# Patient Record
Sex: MALE | Race: WHITE | NOT HISPANIC OR LATINO | Employment: FULL TIME | URBAN - METROPOLITAN AREA
[De-identification: names, ages, dates, MRNs, and addresses within clinical notes are randomized per-mention and may not be internally consistent; named-entity substitution may affect disease eponyms.]

---

## 2017-01-23 ENCOUNTER — TELEPHONE (OUTPATIENT)
Dept: PEDIATRICS | Facility: CLINIC | Age: 37
End: 2017-01-23

## 2017-01-23 ENCOUNTER — OFFICE VISIT (OUTPATIENT)
Dept: PEDIATRICS | Facility: CLINIC | Age: 37
End: 2017-01-23
Payer: COMMERCIAL

## 2017-01-23 VITALS
HEART RATE: 78 BPM | TEMPERATURE: 97.4 F | DIASTOLIC BLOOD PRESSURE: 80 MMHG | BODY MASS INDEX: 35.25 KG/M2 | WEIGHT: 266 LBS | HEIGHT: 73 IN | OXYGEN SATURATION: 97 % | SYSTOLIC BLOOD PRESSURE: 120 MMHG

## 2017-01-23 DIAGNOSIS — Z86.19: ICD-10-CM

## 2017-01-23 DIAGNOSIS — E34.9 TESTOSTERONE DEFICIENCY: Primary | ICD-10-CM

## 2017-01-23 PROCEDURE — 99214 OFFICE O/P EST MOD 30 MIN: CPT | Performed by: NURSE PRACTITIONER

## 2017-01-23 PROCEDURE — 87591 N.GONORRHOEAE DNA AMP PROB: CPT | Performed by: NURSE PRACTITIONER

## 2017-01-23 PROCEDURE — 87491 CHLMYD TRACH DNA AMP PROBE: CPT | Performed by: NURSE PRACTITIONER

## 2017-01-23 RX ORDER — TESTOSTERONE CYPIONATE 1000 MG/10ML
50 INJECTION, SOLUTION INTRAMUSCULAR WEEKLY
Qty: 0.28 ML | Refills: 1 | Status: SHIPPED | OUTPATIENT
Start: 2017-01-23 | End: 2017-01-31

## 2017-01-23 NOTE — NURSING NOTE
"Chief Complaint   Patient presents with     RECHECK       Initial /80 mmHg  Pulse 78  Temp(Src) 97.4  F (36.3  C) (Oral)  Ht 6' 1\" (1.854 m)  Wt 266 lb (120.657 kg)  BMI 35.10 kg/m2  SpO2 97% Estimated body mass index is 35.1 kg/(m^2) as calculated from the following:    Height as of this encounter: 6' 1\" (1.854 m).    Weight as of this encounter: 266 lb (120.657 kg).  BP completed using cuff size: rivas Birch MA      "

## 2017-01-23 NOTE — PROGRESS NOTES
"  SUBJECTIVE:                                                    Andrew Coulter is a 36 year old male who presents to clinic today for the following health issues:      Discuss Testosterone and Transition to injection. Doing well without side effects.    No sx STI. No new exposures.     Repeat STD Test    ROS: const/endo/gu/rectal otherwise negative     OBJECTIVE:  /80 mmHg  Pulse 78  Temp(Src) 97.4  F (36.3  C) (Oral)  Ht 6' 1\" (1.854 m)  Wt 266 lb (120.657 kg)  BMI 35.10 kg/m2  SpO2 97%  CONSTITUTIONAL: Alert, well-nourished, well-groomed, NAD  PSYCH: Bright affect. Appropriate mentation and speech.   RECTAL: Patient self-swabbed.    ASSESSMENT/PLAN:  (E29.1) Testosterone deficiency  (primary encounter diagnosis)  Comment: Excellent improvement in low T sx on 40mg (2 pumps) daily of 1.62% Androgel. No SEs. Patient would like to switch to injection for convenience.   Plan: testosterone cypionate 100 MG/ML SOLN inj,         Needle, Disp, 22G X 1-1/2\" MISC        50mg weekly (pt prefers weekly as opposed to every other week), which should be roughly equivalent to his current dosing. May eventually need to increase to 100mg weekly.         RTC 1 month mid week for T recheck     (Z86.19) History of gonorrhea of rectum  Comment: Here for recheck. Needs test for cure.   Plan: Patient self swabbed today for GONORRHEA.        Heaven Constantino, GEO-DNP.        "

## 2017-01-23 NOTE — TELEPHONE ENCOUNTER
Please let patient know I'd like him to come in for testosterone lab recheck in about a month. He should come mid-week (midway between injections) fasting between 8 and 10am (most accurate).    Umang,    Heaven Constantino, GEO-JORDYN.

## 2017-01-24 NOTE — TELEPHONE ENCOUNTER
Rx faxed to  [facility].  Pat Minor CMA    Called and spoke with patient.  Explained plan, patient agreed to plan, states will make appointment for lab only next month.  Pat Minor CMA

## 2017-01-25 LAB
C TRACH DNA SPEC QL NAA+PROBE: NORMAL
N GONORRHOEA DNA SPEC QL NAA+PROBE: NORMAL
SPECIMEN SOURCE: NORMAL
SPECIMEN SOURCE: NORMAL

## 2017-01-31 ENCOUNTER — TELEPHONE (OUTPATIENT)
Dept: PEDIATRICS | Facility: CLINIC | Age: 37
End: 2017-01-31

## 2017-01-31 DIAGNOSIS — E34.9 TESTOSTERONE DEFICIENCY: Primary | ICD-10-CM

## 2017-01-31 RX ORDER — TESTOSTERONE CYPIONATE 1000 MG/10ML
50 INJECTION, SOLUTION INTRAMUSCULAR WEEKLY
Qty: 10 ML | Refills: 1 | Status: SHIPPED | OUTPATIENT
Start: 2017-01-31 | End: 2017-05-11

## 2017-01-31 NOTE — TELEPHONE ENCOUNTER
[facility] pharmacy calling regarding order they received for Testosterone injection.   The dispense amount is incorrect.  Testosterone is supplied as 200 mg/ml or 100 mg/ml.  If they dispense the 100 mg dose, this is supplied in a 10 ml vial, and this is the dispense amount that needs to be ordered.  This vial is only good for one month after it has been punctured.    The 200 mg dose come in 1 ml dosing.  New order pending for 100 mg dosing with new dispense amount, please sign if in agreement.  SANTY Regalado RN

## 2017-02-21 NOTE — TELEPHONE ENCOUNTER
Called and spoke with patient.  Patient states he is still using gel and has not started injections.  Patient is aware he should have lab work done about 1 month after starting injections, knows that lab order is in system waiting for him.  Pat Minor, CMA

## 2017-02-27 ENCOUNTER — MYC REFILL (OUTPATIENT)
Dept: PEDIATRICS | Facility: CLINIC | Age: 37
End: 2017-02-27

## 2017-02-27 DIAGNOSIS — F51.01 PRIMARY INSOMNIA: ICD-10-CM

## 2017-02-28 RX ORDER — ZOLPIDEM TARTRATE 10 MG/1
10 TABLET ORAL
Qty: 30 TABLET | Refills: 2 | Status: SHIPPED | OUTPATIENT
Start: 2017-02-28 | End: 2017-09-14

## 2017-02-28 NOTE — TELEPHONE ENCOUNTER
Message from [employer]st[employer]:  Original authorizing provider: Jasper Osullivan MD, MD Andrew Coulter would like a refill of the following medications:  zolpidem (AMBIEN) 10 MG tablet [Jasper Osullivan MD, MD]    Preferred pharmacy: [place], [place] - [facility] [address]    Comment:

## 2017-02-28 NOTE — TELEPHONE ENCOUNTER
Done.  Please fax handwritten copy to pharmacy.    Matthias Agustin MD  Internal Medicine and Pediatrics

## 2017-02-28 NOTE — TELEPHONE ENCOUNTER
Ambien      Last Written Prescription Date:  4/12/16  Last Fill Quantity: 30,   # refills: 2  Last Office Visit with [facility], [facility] or [facility] prescribing provider: 1/23/17  Future Office visit:       Routing refill request to provider for review/approval because:  Drug not on the [facility], [facility] or [facility] refill protocol or controlled substance    Jelly Nicole RN  Message handled by Nurse Triage.

## 2017-03-22 ENCOUNTER — HOSPITAL ENCOUNTER (OUTPATIENT)
Dept: LAB | Facility: CLINIC | Age: 37
Discharge: HOME | End: 2017-03-22
Attending: NURSE PRACTITIONER | Admitting: NURSE PRACTITIONER
Payer: COMMERCIAL

## 2017-03-22 DIAGNOSIS — E34.9 TESTOSTERONE DEFICIENCY: ICD-10-CM

## 2017-03-22 PROCEDURE — 84403 ASSAY OF TOTAL TESTOSTERONE: CPT | Performed by: NURSE PRACTITIONER

## 2017-03-22 PROCEDURE — 84270 ASSAY OF SEX HORMONE GLOBUL: CPT | Performed by: NURSE PRACTITIONER

## 2017-03-22 PROCEDURE — 36415 COLL VENOUS BLD VENIPUNCTURE: CPT | Performed by: NURSE PRACTITIONER

## 2017-03-23 ENCOUNTER — MYC MEDICAL ADVICE (OUTPATIENT)
Dept: PEDIATRICS | Facility: CLINIC | Age: 37
End: 2017-03-23

## 2017-03-23 LAB
SHBG SERPL-SCNC: 17 NMOL/L (ref 11–80)
TESTOST FREE SERPL-MCNC: 12.96 NG/DL (ref 4.7–24.4)
TESTOST SERPL-MCNC: 456 NG/DL (ref 240–950)

## 2017-03-23 NOTE — TELEPHONE ENCOUNTER
Patient checked his Testosterone level two weeks early after beginning injections.  Was suppose to wait for one month  Testosterone level is normal.  Any need to recheck level in two weeks?  SANTY Regalado RN    Discussed with Heaven Constantino-patient doesn't need to repeat lab.  Notified thru My chart.  SANTY Regalado RN

## 2017-05-11 ENCOUNTER — MYC MEDICAL ADVICE (OUTPATIENT)
Dept: PEDIATRICS | Facility: CLINIC | Age: 37
End: 2017-05-11

## 2017-05-11 DIAGNOSIS — R79.89 LOW TESTOSTERONE: ICD-10-CM

## 2017-05-11 DIAGNOSIS — E34.9 TESTOSTERONE DEFICIENCY: ICD-10-CM

## 2017-05-11 RX ORDER — TESTOSTERONE CYPIONATE 1000 MG/10ML
50 INJECTION, SOLUTION INTRAMUSCULAR WEEKLY
Qty: 10 ML | Refills: 1 | Status: SHIPPED | OUTPATIENT
Start: 2017-05-11 | End: 2017-07-18 | Stop reason: DRUGHIGH

## 2017-05-11 NOTE — TELEPHONE ENCOUNTER
Patient self-administers injections.  Was last seen to transition to an injection on 1/23/17-needs a refill    testosterone cypionate 100 MG/ML SOLN inj  Last Written Prescription Date: 1/31/17  Last Fill Quantity: 10 ml,  # refills: 1   Last Office Visit with [facility], [facility] or [facility] prescribing provider: 1/23/17                                           Next 5 appointments (look out 90 days)     May 18, 2017  4:30 PM CDT   Office Visit with Jasper Osullivan MD   [facility] ([facility])    [address] [place] [id number] [phone]                Routing refill request to provider for review/approval because:  Drug not on the [facility] refill protocol or controlled substance    Jelly Nicole RN  Message handled by Nurse Triage.

## 2017-05-12 DIAGNOSIS — G47.33 OSA (OBSTRUCTIVE SLEEP APNEA): Primary | ICD-10-CM

## 2017-05-16 DIAGNOSIS — Z20.6 EXPOSURE TO HUMAN IMMUNODEFICIENCY VIRUS: ICD-10-CM

## 2017-05-16 PROCEDURE — 36415 COLL VENOUS BLD VENIPUNCTURE: CPT | Performed by: INTERNAL MEDICINE

## 2017-05-16 PROCEDURE — 80053 COMPREHEN METABOLIC PANEL: CPT | Performed by: INTERNAL MEDICINE

## 2017-05-16 PROCEDURE — 87389 HIV-1 AG W/HIV-1&-2 AB AG IA: CPT | Performed by: INTERNAL MEDICINE

## 2017-05-17 LAB
ALBUMIN SERPL-MCNC: 4.7 G/DL (ref 3.4–5)
ALP SERPL-CCNC: 117 U/L (ref 40–150)
ALT SERPL W P-5'-P-CCNC: 47 U/L (ref 0–70)
ANION GAP SERPL CALCULATED.3IONS-SCNC: 14 MMOL/L (ref 3–14)
AST SERPL W P-5'-P-CCNC: 26 U/L (ref 0–45)
BILIRUB SERPL-MCNC: 0.6 MG/DL (ref 0.2–1.3)
BUN SERPL-MCNC: 18 MG/DL (ref 7–30)
CALCIUM SERPL-MCNC: 9.3 MG/DL (ref 8.5–10.1)
CHLORIDE SERPL-SCNC: 103 MMOL/L (ref 94–109)
CO2 SERPL-SCNC: 22 MMOL/L (ref 20–32)
CREAT SERPL-MCNC: 0.97 MG/DL (ref 0.66–1.25)
GFR SERPL CREATININE-BSD FRML MDRD: 87 ML/MIN/1.7M2
GLUCOSE SERPL-MCNC: 85 MG/DL (ref 70–99)
HIV 1+2 AB+HIV1 P24 AG SERPL QL IA: NORMAL
POTASSIUM SERPL-SCNC: 4 MMOL/L (ref 3.4–5.3)
PROT SERPL-MCNC: 8.2 G/DL (ref 6.8–8.8)
SODIUM SERPL-SCNC: 139 MMOL/L (ref 133–144)

## 2017-06-21 ENCOUNTER — OFFICE VISIT (OUTPATIENT)
Dept: PEDIATRICS | Facility: CLINIC | Age: 37
End: 2017-06-21
Payer: COMMERCIAL

## 2017-06-21 VITALS
HEART RATE: 81 BPM | SYSTOLIC BLOOD PRESSURE: 130 MMHG | HEIGHT: 73 IN | TEMPERATURE: 97.5 F | BODY MASS INDEX: 35.65 KG/M2 | OXYGEN SATURATION: 97 % | DIASTOLIC BLOOD PRESSURE: 80 MMHG | WEIGHT: 269 LBS

## 2017-06-21 DIAGNOSIS — E34.9 TESTOSTERONE DEFICIENCY: ICD-10-CM

## 2017-06-21 DIAGNOSIS — Z20.6 EXPOSURE TO HUMAN IMMUNODEFICIENCY VIRUS: Primary | ICD-10-CM

## 2017-06-21 DIAGNOSIS — L91.8 SKIN TAG: ICD-10-CM

## 2017-06-21 DIAGNOSIS — E78.2 MIXED HYPERLIPIDEMIA: ICD-10-CM

## 2017-06-21 PROCEDURE — 11200 RMVL SKIN TAGS UP TO&INC 15: CPT | Performed by: INTERNAL MEDICINE

## 2017-06-21 PROCEDURE — 99214 OFFICE O/P EST MOD 30 MIN: CPT | Mod: 25 | Performed by: INTERNAL MEDICINE

## 2017-06-21 RX ORDER — TESTOSTERONE CYPIONATE 200 MG/ML
100 INJECTION, SOLUTION INTRAMUSCULAR
Qty: 10 ML | Refills: 3 | Status: SHIPPED | OUTPATIENT
Start: 2017-06-21 | End: 2017-07-25

## 2017-06-21 NOTE — PATIENT INSTRUCTIONS
1) Increase the testosterone to 100 mg     2) Labs in 2 months to recheck testosterone, cholesterol and slow recheck the truvada labs    3) Use topical antibiotic over the skin tag area as needed, watch the other moles for changes    Jasper Osullivan MD

## 2017-06-21 NOTE — PROGRESS NOTES
SUBJECTIVE:                                                    Andrew Coulter is a 36 year old male who presents to clinic today for the following health issues:      Recheck Medication    Testosterone: feels that this is working ok for him. No concerns except he has to change strength vial form as there is a shortage of the 100 mg/ml solution. Last testosterone was lower normal range. Has single testicle. No concerns about fertility in the future and is not worried about this.     PrEP therapy: no concerns about new exposures. No side effects. Feels that this has been working well. Cost ok.    Hyperlipidemia: due for recheck of lipids coming up. No myalgias. Continues to take atorvastatin 20 mg per day. No changes in diet.     Insomnia: continues on ambien, feels that this has been helping and working for him. No concerns.    Skin Check: has noted a skin tag on the left lower belt-line area. Notes that this will get caught and irritated at times. He has noted several moles in his tattoos as well.               Problem list and histories reviewed & adjusted, as indicated.  Additional history: as documented    Patient Active Problem List   Diagnosis     Overweight     ABRAM (obstructive sleep apnea)     PReP therapy     Anxiety     Low testosterone     Mixed hyperlipidemia     Testosterone deficiency     Cervicalgia     Poor posture     Acquired postural kyphosis     Muscle spasm     Thoracic segment dysfunction     Past Surgical History:   Procedure Laterality Date     HERNIA REPAIR       SEPTOPLASTY         Social History   Substance Use Topics     Smoking status: Former Smoker     Packs/day: 0.50     Years: 12.00     Types: Cigarettes     Quit date: 4/11/2016     Smokeless tobacco: Never Used      Comment: using Chantix     Alcohol use 0.0 oz/week     0 Standard drinks or equivalent per week      Comment: 3-4 times per month, 1-2 drinks     Family History   Problem Relation Age of Onset     Hyperlipidemia Mother   "    Depression Mother      Sleep Apnea Mother      Hyperlipidemia Father      Anxiety Disorder Father      Substance Abuse Father      Hypertension Maternal Grandmother      Anxiety Disorder Maternal Grandmother      Hypertension Maternal Grandfather      DIABETES Maternal Grandfather      Substance Abuse Paternal Grandfather      CEREBROVASCULAR DISEASE Paternal Grandfather            Reviewed and updated as needed this visit by clinical staff       Reviewed and updated as needed this visit by Provider         ROS:  Constitutional, HEENT, cardiovascular, pulmonary, GI, , musculoskeletal, neuro, skin, endocrine and psych systems are negative, except as otherwise noted.    OBJECTIVE:                                                    /80 (BP Location: Right arm, Cuff Size: Adult Large)  Pulse 81  Temp 97.5  F (36.4  C) (Oral)  Ht 6' 1\" (1.854 m)  Wt 269 lb (122 kg)  SpO2 97%  BMI 35.49 kg/m2  Body mass index is 35.49 kg/(m^2).  GENERAL: healthy, alert and no distress  NECK: no adenopathy, no asymmetry, masses, or scars and thyroid normal to palpation  RESP: lungs clear to auscultation - no rales, rhonchi or wheezes  CV: regular rate and rhythm, normal S1 S2, no S3 or S4, no murmur, click or rub, no peripheral edema and peripheral pulses strong  ABDOMEN: soft, nontender, no hepatosplenomegaly, no masses and bowel sounds normal  MS: no gross musculoskeletal defects noted, no edema  SKIN: 0.4 cm skin tag noted along the left belt-line area, normal nevus noted in tattoos on back and arm  NEURO: Normal strength and tone, mentation intact and speech normal    Procedure: written consent obtained, area prepped with iodine and using a sterile scissors and technique skin tag was removed.     Diagnostic Test Results:  Pending     ASSESSMENT/PLAN:                                                    1. Testosterone deficiency  - testosterone cypionate (DEPOTESTOTERONE) 200 MG/ML injection; Inject 0.5 mLs (100 mg) " into the muscle every 14 days  Dispense: 10 mL; Refill: 3  - **Testosterone Free and Total FUTURE anytime; Future    2. PReP therapy  Continue with current therapy on truvada, discussed risks and benefits, discussed that this is an insurance policy. Will do full STI testing at least once per year  - HIV Antigen Antibody Combo; Future  - Comprehensive metabolic panel; Future    3. Mixed hyperlipidemia  Due for recheck, will make a lab only for this  - Lipid panel reflex to direct LDL; Future    4. Skin tag  Removed as noted above.  - REMOVAL OF SKIN TAGS, FIRST 15    5. Insomnia: continue on ambien, sleep apnea well controlle.d     Patient Instructions   1) Increase the testosterone to 100 mg     2) Labs in 2 months to recheck testosterone, cholesterol and slow recheck the truvada labs    3) Use topical antibiotic over the skin tag area as needed, watch the other moles for changes    MD Jasper Gandara MD, MD  [facility]

## 2017-06-21 NOTE — MR AVS SNAPSHOT
After Visit Summary   6/21/2017    Andrew Coulter    MRN: [id number]           Patient Information     Date Of Birth          1980        Visit Information        Provider Department      6/21/2017 7:30 AM [facility]        Today's Diagnoses     PReP therapy    -  1    Testosterone deficiency        Mixed hyperlipidemia          Care Instructions    1) Increase the testosterone to 100 mg     2) Labs in 2 months to recheck testosterone, cholesterol and slow recheck the truvada labs    3) Use topical antibiotic over the skin tag area as needed, watch the other moles for changes    Jasper Osullivan MD          Follow-ups after your visit        Future tests that were ordered for you today     Open Future Orders        Priority Expected Expires Ordered    **Testosterone Free and Total FUTURE anytime Routine 6/21/2017 6/21/2018 6/21/2017    HIV Antigen Antibody Combo Routine  6/21/2018 6/21/2017    Comprehensive metabolic panel Routine  6/21/2018 6/21/2017    Lipid panel reflex to direct LDL Routine  6/21/2018 6/21/2017            Who to contact     If you have questions or need follow up information about today's clinic visit or your schedule please contact [facility] directly at [phone].  Normal or non-critical lab and imaging results will be communicated to you by MyChart, letter or phone within 4 business days after the clinic has received the results. If you do not hear from us within 7 days, please contact the clinic through MyChart or phone. If you have a critical or abnormal lab result, we will notify you by phone as soon as possible.  Submit refill requests through [employer] or call your pharmacy and they will forward the refill request to us. Please allow 3 business days for your refill to be completed.          Additional Information About Your Visit        MyChart Information     [employer] gives you secure access to your electronic health record. If  "you see a primary care provider, you can also send messages to your care team and make appointments. If you have questions, please call your primary care clinic.  If you do not have a primary care provider, please call [phone] and they will assist you.        Care EveryWhere ID     This is your Care EveryWhere ID. This could be used by other organizations to access your [facility] medical records  [id number]        Your Vitals Were     Pulse Temperature Height Pulse Oximetry BMI (Body Mass Index)       81 97.5  F (36.4  C) (Oral) 6' 1\" (1.854 m) 97% 35.49 kg/m2        Blood Pressure from Last 3 Encounters:   06/21/17 130/80   01/23/17 120/80   10/24/16 134/86    Weight from Last 3 Encounters:   06/21/17 269 lb (122 kg)   01/23/17 266 lb (120.7 kg)   10/24/16 297 lb 11.2 oz (135 kg)                 Today's Medication Changes          These changes are accurate as of: 6/21/17  8:03 AM.  If you have any questions, ask your nurse or doctor.               These medicines have changed or have updated prescriptions.        Dose/Directions    * testosterone cypionate 100 MG/ML Soln inj   This may have changed:  Another medication with the same name was added. Make sure you understand how and when to take each.   Used for:  Testosterone deficiency   Changed by:  Jasper Osullivan MD        Dose:  50 mg   Inject 0.5 mLs (50 mg) into the muscle once a week   Quantity:  10 mL   Refills:  1       * testosterone cypionate 200 MG/ML injection   Commonly known as:  DEPOTESTOTERONE   This may have changed:  You were already taking a medication with the same name, and this prescription was added. Make sure you understand how and when to take each.   Used for:  Testosterone deficiency   Changed by:  Jasper Osullivan MD        Dose:  100 mg   Inject 0.5 mLs (100 mg) into the muscle every 14 days   Quantity:  10 mL   Refills:  3       * Notice:  This list has 2 medication(s) that are the same as other medications prescribed " for you. Read the directions carefully, and ask your doctor or other care provider to review them with you.         Where to get your medicines      Some of these will need a paper prescription and others can be bought over the counter.  Ask your nurse if you have questions.     Bring a paper prescription for each of these medications     testosterone cypionate 200 MG/ML injection                Primary Care Provider Office Phone # Fax #    Jasper Osullivan [phone]       [facility] [address] [place] [address] [address] [place] [address]        Equal Access to Services     [facility] : Hadii aad ku hadasho Soomaali, waaxda luqadaha, qaybta kaalmada adeegyada, waxay kinain hayjuani dowling . [facility] [phone].    ATENCIÓN: Si habla español, tiene a singh disposición servicios gratuitos de asistencia lingüística. [facility] [phone].    We comply with applicable federal civil rights laws and [place] laws. We do not discriminate on the basis of race, color, national origin, age, disability sex, sexual orientation or gender identity.            Thank you!     Thank you for choosing [facility]  for your care. Our goal is always to provide you with excellent care. Hearing back from our patients is one way we can continue to improve our services. Please take a few minutes to complete the written survey that you may receive in the mail after your visit with us. Thank you!             Your Updated Medication List - Protect others around you: Learn how to safely use, store and throw away your medicines at www.disposemymeds.org.          This list is accurate as of: 6/21/17  8:03 AM.  Always use your most recent med list.                   Brand Name Dispense Instructions for use Diagnosis    aspirin 81 MG tablet      Take by mouth daily        atorvastatin 20 MG tablet    LIPITOR    90 tablet    Take 1 tablet (20 mg) by mouth daily    Hyperlipidemia LDL goal <130        "EPINEPHrine 0.3 MG/0.3ML injection     0.6 mL    Inject 0.3 mLs (0.3 mg) into the muscle once as needed for anaphylaxis    Bee allergy status       Needle (Disp) 22G X 1-1/2\" Misc     25 each    Use 1 needle per injection.    Testosterone deficiency       OMEGA-3 FISH OIL PO           omeprazole 20 MG CR capsule    priLOSEC    90 capsule    Take 1 capsule (20 mg) by mouth daily    Gastroesophageal reflux disease without esophagitis       * testosterone cypionate 100 MG/ML Soln inj     10 mL    Inject 0.5 mLs (50 mg) into the muscle once a week    Testosterone deficiency       * testosterone cypionate 200 MG/ML injection    DEPOTESTOTERONE    10 mL    Inject 0.5 mLs (100 mg) into the muscle every 14 days    Testosterone deficiency       TRUVADA 200-300 MG per tablet   Generic drug:  emtricitabine-tenofovir     90 tablet    TAKE ONE TABLET BY MOUTH EVERY DAY    Exposure to human immunodeficiency virus       VITAMIN D PO           zolpidem 10 MG tablet    AMBIEN    30 tablet    Take 1 tablet (10 mg) by mouth nightly as needed for sleep    Primary insomnia       * Notice:  This list has 2 medication(s) that are the same as other medications prescribed for you. Read the directions carefully, and ask your doctor or other care provider to review them with you.      "

## 2017-06-27 ENCOUNTER — OFFICE VISIT (OUTPATIENT)
Dept: URGENT CARE | Facility: URGENT CARE | Age: 37
End: 2017-06-27
Payer: COMMERCIAL

## 2017-06-27 VITALS
DIASTOLIC BLOOD PRESSURE: 62 MMHG | HEART RATE: 83 BPM | OXYGEN SATURATION: 95 % | SYSTOLIC BLOOD PRESSURE: 130 MMHG | BODY MASS INDEX: 35.49 KG/M2 | WEIGHT: 269 LBS | TEMPERATURE: 98.3 F

## 2017-06-27 DIAGNOSIS — R19.7 DIARRHEA, UNSPECIFIED TYPE: Primary | ICD-10-CM

## 2017-06-27 PROCEDURE — 99213 OFFICE O/P EST LOW 20 MIN: CPT | Performed by: PHYSICIAN ASSISTANT

## 2017-06-27 NOTE — PATIENT INSTRUCTIONS
Bacterial Gastroenteritis (Adult)    You have gastroenteritis. It is an infection in your intestinal tract caused by bacteria (dysentery). Viruses, parasites, and toxins may also cause gastroenteritis. This infection may cause fever, vomiting, stomach cramping, and diarrhea. You may have blood or mucus in your stool. Some of the more common causes of bacterial gastroenteritis include E. coli, salmonella, shigella, campylobacter, and clostridium difficile.  Antibiotics are often used to treat this type of infection. Sometimes you may need to wait until a stool culture is done before your healthcare provider gives you an antibiotic. In the meantime, follow the advice below. Do not take antibiotics without your provider's recommendation. This can lead to other complications in certain types of bacterial gastroenteritis.  Home care  Medications    If your healthcare provider prescribed antibiotics, be sure you take them until they are finished.    You may use acetaminophen or NSAID medicines like ibuprofen or naproxen to control fever unless another medicine was prescribed. If you have chronic liver or kidney disease, talk with your provider before using these medicines. Also talk with your provider if you've had a stomach ulcer or gastrointestinal bleeding. Don't give aspirin to anyone under 18 years of age who is ill with a fever. It may cause severe liver damage. Don't use NSAID medicines if you are already taking one for another condition (like arthritis) or are on aspirin such as for heart disease or after a stroke.    Don't take or give over-the-counter antidiarrhea medicines, unless your healthcare provider prescribed them. Antidiarrhea medicine may make your symptoms last longer if the cause is an infectious diarrhea.    You may be given medicine for nausea and vomiting to help you keep down fluids. Take these medicines as prescribed.    Gastroenteritis is transmitted by contact with the stool or vomit of an  infected person. This can occur directly from person to person or indirectly from contact with a contaminated surface.  General care    If symptoms are severe, rest at home for the next 24 hours, or until you feel better.    Washing your hands with soap and water and using alcohol-bases  is the best way to stop the spread of infection. Wash your hands after touching anyone who is sick.    Wash your hands after using the toilet and before meals. Clean the toilet after each use.    Avoid tobacco, caffeine, and alcohol. These can make your symptoms worse.  Diet  Liquids are the first step:    Water and clear liquids are important so you don't get dehydrated. Drink a small amount at a time or suck on ice chips.  Food    People with diarrhea should not make or serve food for others. When making food for yourself, wash your hands before and after.    Wash your hands after using cutting boards, countertops, and knives that have touched raw food.    Keep uncooked meats away from cooked and ready-to-eat foods.  During the first 24 hours, follow the diet below:    Beverages: sport drinks, soft drinks without caffeine, mineral water (plain or flavored), decaffeinated tea and coffee. If you are very dehydrated, sports drinks are not a good choice. They have too much sugar and not enough electrolytes. In this case, commercially available products called oral rehydration solutions, are best.    Soups: clear broth, consommé, and bouillon    Desserts: plain gelatin, popsicles, and fruit juice bars  During the next 24 hours (the second day), you may add these foods to the above list if you are feeling better. If not, continue what you did the first day:    Hot cereal, plain toast, bread, rolls, crackers    Plain noodles, rice, mashed potatoes, chicken noodle or rice soup    Unsweetened canned fruit (avoid pineapple), bananas    Limit fat to less than 15 grams per day. Avoid margarine, butter, oils, mayonnaise, sauces,  gravies, fried foods, peanut butter, meat, poultry, and fish.    Limit fiber. Avoid raw or cooked vegetables, fresh fruits (except bananas), and bran cereals.    Limit dairy    Continue to avoid alcohol    Limit caffeine and chocolate. No spices or seasonings except salt.  During the next 24 hours:    Gradually resume a normal diet, as you feel better and your symptoms improve.    If at any time you start feeling worse again, go back to clear liquids until you feel better.  Follow-up care  Follow up with your healthcare provider, or as advised. If you don't get better in 24 hours, or if your diarrhea lasts more than 1 week. Also follow up if you are unable to keep liquids down and stay hydrated.  If a stool (diarrhea) sample was taken, you can call for the results as directed.  Call 911  Call 911 if any of these occur:    Trouble breathing    Confused    Very drowsy or trouble awakening    Fainting or loss of consciousness    Rapid heart rate    Chest pain    Seizure    Stiff neck  When to seek medical advice  Call your healthcare provider right away if any of these occur:    Increasing abdominal pain or constant lower right abdominal pain    Continued vomiting (unable to keep liquids down)    Diarrhea for more than 2 days in adults and 24 hours in children    Stools containing blood or pus or black tarry stools    Dark urine, reduced urine output    Weakness, dizziness    Drowsiness    Fever of 100.4 F (38.0 C), oral, or higher; or not better with fever medicine    New rash    If you are experiencing muscle weakness or arthritis symptoms during or after your gastroenteritis is gone  Date Last Reviewed: 1/3/2016    9808-7111 [employer]. [address] [place] [address], [place] [address]. All rights reserved. This information is not intended as a substitute for professional medical care. Always follow your healthcare professional's instructions.

## 2017-06-27 NOTE — MR AVS SNAPSHOT
After Visit Summary   6/27/2017    Andrew Coulter    MRN: [id number]           Patient Information     Date Of Birth          1980        Visit Information        Provider Department      6/27/2017 2:00 PM [facility]  PROVIDER [facility]        Today's Diagnoses     Diarrhea, unspecified type    -  1      Care Instructions      Bacterial Gastroenteritis (Adult)    You have gastroenteritis. It is an infection in your intestinal tract caused by bacteria (dysentery). Viruses, parasites, and toxins may also cause gastroenteritis. This infection may cause fever, vomiting, stomach cramping, and diarrhea. You may have blood or mucus in your stool. Some of the more common causes of bacterial gastroenteritis include E. coli, salmonella, shigella, campylobacter, and clostridium difficile.  Antibiotics are often used to treat this type of infection. Sometimes you may need to wait until a stool culture is done before your healthcare provider gives you an antibiotic. In the meantime, follow the advice below. Do not take antibiotics without your provider's recommendation. This can lead to other complications in certain types of bacterial gastroenteritis.  Home care  Medications    If your healthcare provider prescribed antibiotics, be sure you take them until they are finished.    You may use acetaminophen or NSAID medicines like ibuprofen or naproxen to control fever unless another medicine was prescribed. If you have chronic liver or kidney disease, talk with your provider before using these medicines. Also talk with your provider if you've had a stomach ulcer or gastrointestinal bleeding. Don't give aspirin to anyone under 18 years of age who is ill with a fever. It may cause severe liver damage. Don't use NSAID medicines if you are already taking one for another condition (like arthritis) or are on aspirin such as for heart disease or after a stroke.    Don't take or give over-the-counter  antidiarrhea medicines, unless your healthcare provider prescribed them. Antidiarrhea medicine may make your symptoms last longer if the cause is an infectious diarrhea.    You may be given medicine for nausea and vomiting to help you keep down fluids. Take these medicines as prescribed.    Gastroenteritis is transmitted by contact with the stool or vomit of an infected person. This can occur directly from person to person or indirectly from contact with a contaminated surface.  General care    If symptoms are severe, rest at home for the next 24 hours, or until you feel better.    Washing your hands with soap and water and using alcohol-bases  is the best way to stop the spread of infection. Wash your hands after touching anyone who is sick.    Wash your hands after using the toilet and before meals. Clean the toilet after each use.    Avoid tobacco, caffeine, and alcohol. These can make your symptoms worse.  Diet  Liquids are the first step:    Water and clear liquids are important so you don't get dehydrated. Drink a small amount at a time or suck on ice chips.  Food    People with diarrhea should not make or serve food for others. When making food for yourself, wash your hands before and after.    Wash your hands after using cutting boards, countertops, and knives that have touched raw food.    Keep uncooked meats away from cooked and ready-to-eat foods.  During the first 24 hours, follow the diet below:    Beverages: sport drinks, soft drinks without caffeine, mineral water (plain or flavored), decaffeinated tea and coffee. If you are very dehydrated, sports drinks are not a good choice. They have too much sugar and not enough electrolytes. In this case, commercially available products called oral rehydration solutions, are best.    Soups: clear broth, consommé, and bouillon    Desserts: plain gelatin, popsicles, and fruit juice bars  During the next 24 hours (the second day), you may add these foods  to the above list if you are feeling better. If not, continue what you did the first day:    Hot cereal, plain toast, bread, rolls, crackers    Plain noodles, rice, mashed potatoes, chicken noodle or rice soup    Unsweetened canned fruit (avoid pineapple), bananas    Limit fat to less than 15 grams per day. Avoid margarine, butter, oils, mayonnaise, sauces, gravies, fried foods, peanut butter, meat, poultry, and fish.    Limit fiber. Avoid raw or cooked vegetables, fresh fruits (except bananas), and bran cereals.    Limit dairy    Continue to avoid alcohol    Limit caffeine and chocolate. No spices or seasonings except salt.  During the next 24 hours:    Gradually resume a normal diet, as you feel better and your symptoms improve.    If at any time you start feeling worse again, go back to clear liquids until you feel better.  Follow-up care  Follow up with your healthcare provider, or as advised. If you don't get better in 24 hours, or if your diarrhea lasts more than 1 week. Also follow up if you are unable to keep liquids down and stay hydrated.  If a stool (diarrhea) sample was taken, you can call for the results as directed.  Call 911  Call 911 if any of these occur:    Trouble breathing    Confused    Very drowsy or trouble awakening    Fainting or loss of consciousness    Rapid heart rate    Chest pain    Seizure    Stiff neck  When to seek medical advice  Call your healthcare provider right away if any of these occur:    Increasing abdominal pain or constant lower right abdominal pain    Continued vomiting (unable to keep liquids down)    Diarrhea for more than 2 days in adults and 24 hours in children    Stools containing blood or pus or black tarry stools    Dark urine, reduced urine output    Weakness, dizziness    Drowsiness    Fever of 100.4 F (38.0 C), oral, or higher; or not better with fever medicine    New rash    If you are experiencing muscle weakness or arthritis symptoms during or after your  gastroenteritis is gone  Date Last Reviewed: 1/3/2016    6066-4224 [employer]. [address] [place] [address], [place] [address]. All rights reserved. This information is not intended as a substitute for professional medical care. Always follow your healthcare professional's instructions.                Follow-ups after your visit        Future tests that were ordered for you today     Open Future Orders        Priority Expected Expires Ordered    Enteric Bacteria and Virus Panel by ELIZABETH Stool Routine  6/27/2018 6/27/2017            Who to contact     If you have questions or need follow up information about today's clinic visit or your schedule please contact [facility] directly at [phone].  Normal or non-critical lab and imaging results will be communicated to you by [employer], letter or phone within 4 business days after the clinic has received the results. If you do not hear from us within 7 days, please contact the clinic through [employer] or phone. If you have a critical or abnormal lab result, we will notify you by phone as soon as possible.  Submit refill requests through [employer] or call your pharmacy and they will forward the refill request to us. Please allow 3 business days for your refill to be completed.          Additional Information About Your Visit        MyChart Information     [employer] gives you secure access to your electronic health record. If you see a primary care provider, you can also send messages to your care team and make appointments. If you have questions, please call your primary care clinic.  If you do not have a primary care provider, please call [phone] and they will assist you.        Care EveryWhere ID     This is your Care EveryWhere ID. This could be used by other organizations to access your [place] medical records  [id number]        Your Vitals Were     Pulse Temperature Pulse Oximetry BMI (Body Mass Index)          83 98.3  F (36.8  C) (Tympanic)  95% 35.49 kg/m2         Blood Pressure from Last 3 Encounters:   06/27/17 130/62   06/21/17 130/80   01/23/17 120/80    Weight from Last 3 Encounters:   06/27/17 269 lb (122 kg)   06/21/17 269 lb (122 kg)   01/23/17 266 lb (120.7 kg)               Primary Care Provider Office Phone # Fax #    Jasper Osullivan [phone]       [facility] [address] [place] [address]        Equal Access to Services     [facility]: Hadii aad ku hadasho Soomaali, waaxda luqadaha, qaybta kaalmada adeegyada, alfred dowling . So [facility] [phone].    ATENCIÓN: Si habla español, tiene a singh disposición servicios gratuitos de asistencia lingüística. [facility] [phone].    We comply with applicable federal civil rights laws and [place] laws. We do not discriminate on the basis of race, color, national origin, age, disability sex, sexual orientation or gender identity.            Thank you!     Thank you for choosing [facility]  for your care. Our goal is always to provide you with excellent care. Hearing back from our patients is one way we can continue to improve our services. Please take a few minutes to complete the written survey that you may receive in the mail after your visit with us. Thank you!             Your Updated Medication List - Protect others around you: Learn how to safely use, store and throw away your medicines at www.disposemymeds.org.          This list is accurate as of: 6/27/17  2:59 PM.  Always use your most recent med list.                   Brand Name Dispense Instructions for use Diagnosis    aspirin 81 MG tablet      Take by mouth daily        atorvastatin 20 MG tablet    LIPITOR    90 tablet    Take 1 tablet (20 mg) by mouth daily    Hyperlipidemia LDL goal <130       EPINEPHrine 0.3 MG/0.3ML injection     0.6 mL    Inject 0.3 mLs (0.3 mg) into the muscle once as needed for anaphylaxis    Bee allergy status        "Needle (Disp) 22G X 1-1/2\" Misc     25 each    Use 1 needle per injection.    Testosterone deficiency       OMEGA-3 FISH OIL PO           omeprazole 20 MG CR capsule    priLOSEC    90 capsule    Take 1 capsule (20 mg) by mouth daily    Gastroesophageal reflux disease without esophagitis       * testosterone cypionate 100 MG/ML Soln inj     10 mL    Inject 0.5 mLs (50 mg) into the muscle once a week    Testosterone deficiency       * testosterone cypionate 200 MG/ML injection    DEPOTESTOTERONE    10 mL    Inject 0.5 mLs (100 mg) into the muscle every 14 days    Testosterone deficiency       TRUVADA 200-300 MG per tablet   Generic drug:  emtricitabine-tenofovir     90 tablet    TAKE ONE TABLET BY MOUTH EVERY DAY    Exposure to human immunodeficiency virus       VITAMIN D PO           zolpidem 10 MG tablet    AMBIEN    30 tablet    Take 1 tablet (10 mg) by mouth nightly as needed for sleep    Primary insomnia       * Notice:  This list has 2 medication(s) that are the same as other medications prescribed for you. Read the directions carefully, and ask your doctor or other care provider to review them with you.      "

## 2017-06-27 NOTE — NURSING NOTE
"Chief Complaint   Patient presents with     Urgent Care     Diarrhea     Pt states has had diarrhea x 6 days. States has had chills and body aches. PT has tried immodium.        Initial /62 (BP Location: Right arm, Patient Position: Chair, Cuff Size: Adult Large)  Pulse 83  Temp 98.3  F (36.8  C) (Tympanic)  Wt 269 lb (122 kg)  SpO2 95%  BMI 35.49 kg/m2 Estimated body mass index is 35.49 kg/(m^2) as calculated from the following:    Height as of 6/21/17: 6' 1\" (1.854 m).    Weight as of this encounter: 269 lb (122 kg).  Medication Reconciliation: unable or not appropriate to perform   Ezra Toth CMA (CARRI) 6/27/2017 2:26 PM    "

## 2017-06-27 NOTE — PROGRESS NOTES
"SUBJECTIVE  HPI:  Andrew Coulter is a 36 year old male who presents with the CC of diarrhea.  Patient denies pain, reporting generalized abdominal discomfort which at its worst is a level 2 on a scale of 1-10.  Pain has been present for 5 day(s) and is stable.  EXACERBATING FACTORS: recumbency  RELIEVING FACTORS: nothing.  ASSOCIATED SX: diarrhea and bloating.    Reports 6 loose, watery stools in the last 24 hours.  Tolerating fluids. No fever. No blood in stool.  Reminds patient of last time he had giardia.  No pet or animal exposures, no outdoor activities, no food exposures or recent travel. Works as RN in ICU.       No past medical history on file.  Current Outpatient Prescriptions   Medication Sig Dispense Refill     testosterone cypionate (DEPOTESTOTERONE) 200 MG/ML injection Inject 0.5 mLs (100 mg) into the muscle every 14 days 10 mL 3     testosterone cypionate 100 MG/ML SOLN inj Inject 0.5 mLs (50 mg) into the muscle once a week 10 mL 1     TRUVADA 200-300 MG per tablet TAKE ONE TABLET BY MOUTH EVERY DAY 90 tablet 0     zolpidem (AMBIEN) 10 MG tablet Take 1 tablet (10 mg) by mouth nightly as needed for sleep 30 tablet 2     Needle, Disp, 22G X 1-1/2\" MISC Use 1 needle per injection. 25 each 1     atorvastatin (LIPITOR) 20 MG tablet Take 1 tablet (20 mg) by mouth daily 90 tablet 3     omeprazole (PRILOSEC) 20 MG capsule Take 1 capsule (20 mg) by mouth daily 90 capsule 3     EPINEPHrine (EPIPEN) 0.3 MG/0.3ML injection Inject 0.3 mLs (0.3 mg) into the muscle once as needed for anaphylaxis 0.6 mL 1     aspirin 81 MG tablet Take by mouth daily       Omega-3 Fatty Acids (OMEGA-3 FISH OIL PO)        Cholecalciferol (VITAMIN D PO)        Social History   Substance Use Topics     Smoking status: Former Smoker     Packs/day: 0.50     Years: 12.00     Types: Cigarettes     Quit date: 4/11/2016     Smokeless tobacco: Never Used      Comment: using Chantix     Alcohol use 0.0 oz/week     0 Standard drinks or equivalent " per week      Comment: 3-4 times per month, 1-2 drinks       ROS:  Review of systems negative except as stated above.    OBJECTIVE:  /62 (BP Location: Right arm, Patient Position: Chair, Cuff Size: Adult Large)  Pulse 83  Temp 98.3  F (36.8  C) (Tympanic)  Wt 269 lb (122 kg)  SpO2 95%  BMI 35.49 kg/m2  GENERAL APPEARANCE: healthy, alert and no distress  EYES: EOMI,  PERRL, conjunctiva clear  HENT: ear canals and TM's normal.  Nose and mouth without ulcers, erythema or lesions  NECK: supple, nontender, no lymphadenopathy  RESP: lungs clear to auscultation - no rales, rhonchi or wheezes  CV: regular rates and rhythm, normal S1 S2, no murmur noted  ABDOMEN:  soft, no guarding, some tenderness reported throughout, no HSM or masses and bowel sounds normal      ASSESSMENT:  (R19.7) Diarrhea, unspecified type  (primary encounter diagnosis)  Comment: patient appears and reports being well hydrated.  Plan: Enteric Bacteria and Virus Panel by ELIZABETH Stool       Red flags and emergent follow up discussed, and understood by patient  Follow up with PCP if symptoms worsen or fail to improve

## 2017-07-05 ENCOUNTER — HOSPITAL ENCOUNTER (OUTPATIENT)
Dept: LAB | Facility: CLINIC | Age: 37
Discharge: HOME | End: 2017-07-05
Attending: PHYSICIAN ASSISTANT | Admitting: PHYSICIAN ASSISTANT
Payer: COMMERCIAL

## 2017-07-05 DIAGNOSIS — R19.7 DIARRHEA, UNSPECIFIED TYPE: ICD-10-CM

## 2017-07-05 LAB
CAMPYLOBACTER GROUP BY NAT: NOT DETECTED
ENTERIC PATHOGEN COMMENT: NORMAL
NOROVIRUS I AND II BY NAT: NOT DETECTED
ROTAVIRUS A BY NAT: NOT DETECTED
SALMONELLA SPECIES BY NAT: NOT DETECTED
SHIGA TOXIN 1 GENE BY NAT: NOT DETECTED
SHIGA TOXIN 2 GENE BY NAT: NOT DETECTED
SHIGELLA SP+EIEC IPAH STL QL NAA+PROBE: NOT DETECTED
VIBRIO GROUP BY NAT: NOT DETECTED
YERSINIA ENTEROCOLITICA BY NAT: NOT DETECTED

## 2017-07-05 PROCEDURE — 87506 IADNA-DNA/RNA PROBE TQ 6-11: CPT | Performed by: PHYSICIAN ASSISTANT

## 2017-07-18 ENCOUNTER — OFFICE VISIT (OUTPATIENT)
Dept: PEDIATRICS | Facility: CLINIC | Age: 37
End: 2017-07-18
Payer: COMMERCIAL

## 2017-07-18 VITALS
HEART RATE: 80 BPM | DIASTOLIC BLOOD PRESSURE: 76 MMHG | WEIGHT: 267 LBS | BODY MASS INDEX: 35.39 KG/M2 | OXYGEN SATURATION: 96 % | TEMPERATURE: 98.1 F | HEIGHT: 73 IN | SYSTOLIC BLOOD PRESSURE: 124 MMHG

## 2017-07-18 DIAGNOSIS — Z71.6 ENCOUNTER FOR SMOKING CESSATION COUNSELING: Primary | ICD-10-CM

## 2017-07-18 PROCEDURE — 99213 OFFICE O/P EST LOW 20 MIN: CPT | Performed by: INTERNAL MEDICINE

## 2017-07-18 RX ORDER — BUPROPION HYDROCHLORIDE 300 MG/1
300 TABLET ORAL EVERY MORNING
Qty: 90 TABLET | Refills: 1 | Status: SHIPPED | OUTPATIENT
Start: 2017-07-18 | End: 2018-01-02

## 2017-07-18 RX ORDER — BUPROPION HYDROCHLORIDE 150 MG/1
150 TABLET ORAL EVERY MORNING
Qty: 45 TABLET | Refills: 1 | Status: SHIPPED | OUTPATIENT
Start: 2017-07-18 | End: 2017-09-14 | Stop reason: DRUGHIGH

## 2017-07-18 NOTE — PATIENT INSTRUCTIONS
1) Wellbutrin start at 150 mg per day and increase to 300 mg per day after 1-2 weeks if tolerating well. Set a quit day in 2-4 weeks once on this    2) Can use patches and gum if wanting with this    3) Let me know if side effects on this or other concerns come up.    Jasper Osullivan MD

## 2017-07-18 NOTE — NURSING NOTE
"Chief Complaint   Patient presents with     Medication Request       Initial /76 (BP Location: Right arm, Cuff Size: Adult Large)  Pulse 80  Temp 98.1  F (36.7  C) (Oral)  Ht 6' 1\" (1.854 m)  Wt 267 lb (121.1 kg)  SpO2 96%  BMI 35.23 kg/m2 Estimated body mass index is 35.23 kg/(m^2) as calculated from the following:    Height as of this encounter: 6' 1\" (1.854 m).    Weight as of this encounter: 267 lb (121.1 kg).  Medication Reconciliation: complete   Janee Birch MA    "

## 2017-07-18 NOTE — MR AVS SNAPSHOT
After Visit Summary   7/18/2017    Andrew Coulter    MRN: [id number]           Patient Information     Date Of Birth          1980        Visit Information        Provider Department      7/18/2017 3:30 PM Jasper Osullivan MD [facility]        Today's Diagnoses     Encounter for smoking cessation counseling    -  1      Care Instructions    1) Wellbutrin start at 150 mg per day and increase to 300 mg per day after 1-2 weeks if tolerating well. Set a quit day in 2-4 weeks once on this    2) Can use patches and gum if wanting with this    3) Let me know if side effects on this or other concerns come up.    Jasper Osullivan MD          Follow-ups after your visit        Who to contact     If you have questions or need follow up information about today's clinic visit or your schedule please contact [facility] directly at [phone].  Normal or non-critical lab and imaging results will be communicated to you by MyChart, letter or phone within 4 business days after the clinic has received the results. If you do not hear from us within 7 days, please contact the clinic through [employer] or phone. If you have a critical or abnormal lab result, we will notify you by phone as soon as possible.  Submit refill requests through [employer] or call your pharmacy and they will forward the refill request to us. Please allow 3 business days for your refill to be completed.          Additional Information About Your Visit        MyChart Information     [employer] gives you secure access to your electronic health record. If you see a primary care provider, you can also send messages to your care team and make appointments. If you have questions, please call your primary care clinic.  If you do not have a primary care provider, please call [phone] and they will assist you.        Care EveryWhere ID     This is your Care EveryWhere ID. This could be used by other organizations to access your  "[place] medical records  [id number]        Your Vitals Were     Pulse Temperature Height Pulse Oximetry BMI (Body Mass Index)       80 98.1  F (36.7  C) (Oral) 6' 1\" (1.854 m) 96% 35.23 kg/m2        Blood Pressure from Last 3 Encounters:   07/18/17 124/76   06/27/17 130/62   06/21/17 130/80    Weight from Last 3 Encounters:   07/18/17 267 lb (121.1 kg)   06/27/17 269 lb (122 kg)   06/21/17 269 lb (122 kg)              Today, you had the following     No orders found for display         Today's Medication Changes          These changes are accurate as of: 7/18/17  3:48 PM.  If you have any questions, ask your nurse or doctor.               Start taking these medicines.        Dose/Directions    * buPROPion 150 MG 24 hr tablet   Commonly known as:  WELLBUTRIN XL   Used for:  Encounter for smoking cessation counseling   Started by:  Jasper Osullivan MD        Dose:  150 mg   Take 1 tablet (150 mg) by mouth every morning   Quantity:  45 tablet   Refills:  1       * buPROPion 300 MG 24 hr tablet   Commonly known as:  WELLBUTRIN XL   Used for:  Encounter for smoking cessation counseling   Started by:  Jasper Osullivan MD        Dose:  300 mg   Take 1 tablet (300 mg) by mouth every morning   Quantity:  90 tablet   Refills:  1       * Notice:  This list has 2 medication(s) that are the same as other medications prescribed for you. Read the directions carefully, and ask your doctor or other care provider to review them with you.      These medicines have changed or have updated prescriptions.        Dose/Directions    testosterone cypionate 200 MG/ML injection   Commonly known as:  DEPOTESTOTERONE   This may have changed:  Another medication with the same name was removed. Continue taking this medication, and follow the directions you see here.   Used for:  Testosterone deficiency   Changed by:  Jasper Osullivan MD        Dose:  100 mg   Inject 0.5 mLs (100 mg) into the muscle every 14 days   Quantity:  10 mL "   Refills:  3            Where to get your medicines      These medications were sent to [facility] [place], [place] - [address]  [address] [facility] [address]     Phone:  [phone]     buPROPion 150 MG 24 hr tablet    buPROPion 300 MG 24 hr tablet                Primary Care Provider Office Phone # Fax #    Jasper Osullivan [phone]       [facility] [address] [place] [address]        Equal Access to Services     [facility]: Hadii aad ku hadasho Soomaali, waaxda luqadaha, qaybta kaalmada adeegyada, waxay idiin hayaan adeeg kharaerica dowling . So [facility] [phone].    ATENCIÓN: Si habla español, tiene a singh disposición servicios gratuitos de asistencia lingüística. [facility] [phone].    We comply with applicable federal civil rights laws and [place] laws. We do not discriminate on the basis of race, color, national origin, age, disability sex, sexual orientation or gender identity.            Thank you!     Thank you for choosing [facility]  for your care. Our goal is always to provide you with excellent care. Hearing back from our patients is one way we can continue to improve our services. Please take a few minutes to complete the written survey that you may receive in the mail after your visit with us. Thank you!             Your Updated Medication List - Protect others around you: Learn how to safely use, store and throw away your medicines at www.disposemymeds.org.          This list is accurate as of: 7/18/17  3:48 PM.  Always use your most recent med list.                   Brand Name Dispense Instructions for use Diagnosis    aspirin 81 MG tablet      Take by mouth daily        atorvastatin 20 MG tablet    LIPITOR    90 tablet    Take 1 tablet (20 mg) by mouth daily    Hyperlipidemia LDL goal <130       * buPROPion 150 MG 24 hr tablet    WELLBUTRIN XL    45 tablet    Take 1 tablet (150 mg) by  "mouth every morning    Encounter for smoking cessation counseling       * buPROPion 300 MG 24 hr tablet    WELLBUTRIN XL    90 tablet    Take 1 tablet (300 mg) by mouth every morning    Encounter for smoking cessation counseling       EPINEPHrine 0.3 MG/0.3ML injection 2-pack    EPIPEN/ADRENACLICK/or ANY BX GENERIC EQUIV    0.6 mL    Inject 0.3 mLs (0.3 mg) into the muscle once as needed for anaphylaxis    Bee allergy status       Needle (Disp) 22G X 1-1/2\" Misc     25 each    Use 1 needle per injection.    Testosterone deficiency       OMEGA-3 FISH OIL PO           omeprazole 20 MG CR capsule    priLOSEC    90 capsule    Take 1 capsule (20 mg) by mouth daily    Gastroesophageal reflux disease without esophagitis       testosterone cypionate 200 MG/ML injection    DEPOTESTOTERONE    10 mL    Inject 0.5 mLs (100 mg) into the muscle every 14 days    Testosterone deficiency       TRUVADA 200-300 MG per tablet   Generic drug:  emtricitabine-tenofovir     90 tablet    TAKE ONE TABLET BY MOUTH EVERY DAY    Exposure to human immunodeficiency virus       VITAMIN D PO           zolpidem 10 MG tablet    AMBIEN    30 tablet    Take 1 tablet (10 mg) by mouth nightly as needed for sleep    Primary insomnia       * Notice:  This list has 2 medication(s) that are the same as other medications prescribed for you. Read the directions carefully, and ask your doctor or other care provider to review them with you.      "

## 2017-07-18 NOTE — PROGRESS NOTES
SUBJECTIVE:                                                    Andrew Coulter is a 36 year old male who presents to clinic today for the following health issues:      Discuss Treatment to quit smoking    Smoking: would like to discuss options for stopping smoking. Notes that he has been trying patches but this has not been cutting it. Has not tried Chantix He would like to discuss options.    Notes new stress with two friends being killed by drunk . His mood has been more down since then but no SI or HI. Had been on lexapro in the past but does not feel the need to start again.      Problem list and histories reviewed & adjusted, as indicated.  Additional history: as documented    Patient Active Problem List   Diagnosis     Overweight     ABRAM (obstructive sleep apnea)     PReP therapy     Anxiety     Low testosterone     Mixed hyperlipidemia     Testosterone deficiency     Cervicalgia     Poor posture     Acquired postural kyphosis     Muscle spasm     Thoracic segment dysfunction     Past Surgical History:   Procedure Laterality Date     HERNIA REPAIR       SEPTOPLASTY         Social History   Substance Use Topics     Smoking status: Former Smoker     Packs/day: 0.50     Years: 12.00     Types: Cigarettes     Quit date: 4/11/2016     Smokeless tobacco: Never Used      Comment: using Chantix     Alcohol use 0.0 oz/week     0 Standard drinks or equivalent per week      Comment: 3-4 times per month, 1-2 drinks     Family History   Problem Relation Age of Onset     Hyperlipidemia Mother      Depression Mother      Sleep Apnea Mother      Hyperlipidemia Father      Anxiety Disorder Father      Substance Abuse Father      Hypertension Maternal Grandmother      Anxiety Disorder Maternal Grandmother      Hypertension Maternal Grandfather      DIABETES Maternal Grandfather      Substance Abuse Paternal Grandfather      CEREBROVASCULAR DISEASE Paternal Grandfather              Reviewed and updated as needed this visit  "by clinical staff       Reviewed and updated as needed this visit by Provider         ROS:  Constitutional, HEENT, cardiovascular, pulmonary, GI, , musculoskeletal, neuro, skin, endocrine and psych systems are negative, except as otherwise noted.      OBJECTIVE:   /76 (BP Location: Right arm, Cuff Size: Adult Large)  Pulse 80  Temp 98.1  F (36.7  C) (Oral)  Ht 6' 1\" (1.854 m)  Wt 267 lb (121.1 kg)  SpO2 96%  BMI 35.23 kg/m2  Body mass index is 35.23 kg/(m^2).  GENERAL: healthy, alert and no distress  HEENT: MMM without lesions  RESP: regular and unlabored  NEURO: Normal strength and tone, mentation intact and speech normal  PSYCH: mentation appears normal, affect normal/bright    Diagnostic Test Results:  none     ASSESSMENT/PLAN:   1. Encounter for smoking cessation counseling  Discussed side effects with medication, will try wellbutrin first then consider chantix if no improvement, can continue to use patches and gum with this as well.   - buPROPion (WELLBUTRIN XL) 150 MG 24 hr tablet; Take 1 tablet (150 mg) by mouth every morning  Dispense: 45 tablet; Refill: 1  - buPROPion (WELLBUTRIN XL) 300 MG 24 hr tablet; Take 1 tablet (300 mg) by mouth every morning  Dispense: 90 tablet; Refill: 1      Patient Instructions   1) Wellbutrin start at 150 mg per day and increase to 300 mg per day after 1-2 weeks if tolerating well. Set a quit day in 2-4 weeks once on this    2) Can use patches and gum if wanting with this    3) Let me know if side effects on this or other concerns come up.    MD Jasper Gandara MD, MD  [facility]  "

## 2017-07-19 ENCOUNTER — TELEPHONE (OUTPATIENT)
Dept: PEDIATRICS | Facility: CLINIC | Age: 37
End: 2017-07-19

## 2017-07-19 DIAGNOSIS — E34.9 TESTOSTERONE DEFICIENCY: ICD-10-CM

## 2017-07-19 RX ORDER — TESTOSTERONE CYPIONATE 200 MG/ML
100 INJECTION, SOLUTION INTRAMUSCULAR
Qty: 10 ML | Refills: 3 | Status: CANCELLED | OUTPATIENT
Start: 2017-07-19

## 2017-07-19 NOTE — TELEPHONE ENCOUNTER
This writer working on submitting PA via [email or web address]//Mary Anne Bergeron MA// July 19, 2017 9:44 AM    Andrew Coulter (Key: [id number])     Your information has been submitted to [employer].  If [facility] has not responded in 3-5 business days, or if you have any questions about you PA submission, contact [facility] at [phone].  //Mary Anne Bergeron MA// July 19, 2017 10:20 AM

## 2017-07-21 NOTE — TELEPHONE ENCOUNTER
Received a VM on my triage line-asking for a call back to [phone] regarding this PA.  Asking if a total testosterone was drawn within the last 6 months. Per chart review, it was done on 3/22/17.    Please call back.  Jelly Nicole RN  Message handled by Nurse Triage.

## 2017-07-24 NOTE — TELEPHONE ENCOUNTER
Called and spoke with a representative named Jin, provided further information so they can make a determination on PA. Should have an outcome before the end of the day.     Yuridia Dhillon CMA ([facility])

## 2017-07-25 ENCOUNTER — MYC MEDICAL ADVICE (OUTPATIENT)
Dept: PEDIATRICS | Facility: CLINIC | Age: 37
End: 2017-07-25

## 2017-07-25 DIAGNOSIS — E34.9 TESTOSTERONE DEFICIENCY: ICD-10-CM

## 2017-07-25 RX ORDER — TESTOSTERONE CYPIONATE 200 MG/ML
100 INJECTION, SOLUTION INTRAMUSCULAR WEEKLY
Qty: 10 ML | Refills: 3
Start: 2017-07-25 | End: 2017-08-14

## 2017-07-25 NOTE — TELEPHONE ENCOUNTER
"Please advise on testosterone dose-per office visit on 6/21: \"Increase the testosterone to 100 mg\"  The script was for:   Inject 0.5 mLs (100 mg) into the muscle every 14 days.  Patient states that he was taking 100 mg a week already-please advise.  Jelly Nicole RN  Message handled by Nurse Triage.        "

## 2017-08-12 DIAGNOSIS — Z20.6 EXPOSURE TO HUMAN IMMUNODEFICIENCY VIRUS: ICD-10-CM

## 2017-08-14 NOTE — TELEPHONE ENCOUNTER
TRUVADA 200-300 MG per tablet      Last Written Prescription Date:  5/9/2017  Last Fill Quantity: 90,   # refills: 0  Last Office Visit with [facility] prescribing provider: 7/18/2017  Future Office visit:       Routing refill request to provider for review/approval because:  Drug not on the [facility] refill protocol or controlled substance

## 2017-08-15 NOTE — TELEPHONE ENCOUNTER
Routing refill request to provider for review/approval because:  Drug not on the [facility] refill protocol     Ros RENTERIA RN, BSN, PHN  [facility] RN

## 2017-08-16 RX ORDER — DEXAMETHASONE 0.75 MG/1
TABLET ORAL
Qty: 90 TABLET | Refills: 0 | Status: SHIPPED | OUTPATIENT
Start: 2017-08-16 | End: 2017-11-22

## 2017-09-07 ENCOUNTER — MYC MEDICAL ADVICE (OUTPATIENT)
Dept: PEDIATRICS | Facility: CLINIC | Age: 37
End: 2017-09-07

## 2017-09-07 DIAGNOSIS — Z71.84 TRAVEL ADVICE ENCOUNTER: Primary | ICD-10-CM

## 2017-09-07 NOTE — TELEPHONE ENCOUNTER
Please review the [facility] message from pt & advise. LOV was on 07/18/17.    Anastasiia SORIA, RN  Triage Nurse

## 2017-09-08 ENCOUNTER — HOSPITAL ENCOUNTER (OUTPATIENT)
Dept: LAB | Facility: CLINIC | Age: 37
Discharge: HOME | End: 2017-09-08
Attending: INTERNAL MEDICINE | Admitting: INTERNAL MEDICINE
Payer: COMMERCIAL

## 2017-09-08 DIAGNOSIS — Z71.84 TRAVEL ADVICE ENCOUNTER: ICD-10-CM

## 2017-09-08 DIAGNOSIS — E78.2 MIXED HYPERLIPIDEMIA: ICD-10-CM

## 2017-09-08 DIAGNOSIS — E34.9 TESTOSTERONE DEFICIENCY: ICD-10-CM

## 2017-09-08 LAB
ABO + RH BLD: NORMAL
ABO + RH BLD: NORMAL
ALBUMIN SERPL-MCNC: 4.5 G/DL (ref 3.4–5)
ALBUMIN UR-MCNC: 30 MG/DL
ALP SERPL-CCNC: 129 U/L (ref 40–150)
ALT SERPL W P-5'-P-CCNC: 41 U/L (ref 0–70)
ANION GAP SERPL CALCULATED.3IONS-SCNC: 8 MMOL/L (ref 3–14)
APPEARANCE UR: CLEAR
AST SERPL W P-5'-P-CCNC: 22 U/L (ref 0–45)
BASOPHILS # BLD AUTO: 0 10E9/L (ref 0–0.2)
BASOPHILS NFR BLD AUTO: 0.4 %
BILIRUB SERPL-MCNC: 1.2 MG/DL (ref 0.2–1.3)
BILIRUB UR QL STRIP: NEGATIVE
BUN SERPL-MCNC: 15 MG/DL (ref 7–30)
CALCIUM SERPL-MCNC: 8.8 MG/DL (ref 8.5–10.1)
CHLORIDE SERPL-SCNC: 103 MMOL/L (ref 94–109)
CHOLEST SERPL-MCNC: 135 MG/DL
CO2 SERPL-SCNC: 28 MMOL/L (ref 20–32)
COLOR UR AUTO: ABNORMAL
CREAT SERPL-MCNC: 1.07 MG/DL (ref 0.66–1.25)
DIFFERENTIAL METHOD BLD: NORMAL
EOSINOPHIL # BLD AUTO: 0.1 10E9/L (ref 0–0.7)
EOSINOPHIL NFR BLD AUTO: 0.9 %
ERYTHROCYTE [DISTWIDTH] IN BLOOD BY AUTOMATED COUNT: 12.5 % (ref 10–15)
GFR SERPL CREATININE-BSD FRML MDRD: 78 ML/MIN/1.7M2
GLUCOSE SERPL-MCNC: 89 MG/DL (ref 70–99)
GLUCOSE UR STRIP-MCNC: NEGATIVE MG/DL
HBV SURFACE AB SERPL IA-ACNC: 0.96 M[IU]/ML
HCT VFR BLD AUTO: 46.7 % (ref 40–53)
HCV AB SERPL QL IA: NONREACTIVE
HDLC SERPL-MCNC: 36 MG/DL
HGB BLD-MCNC: 15.9 G/DL (ref 13.3–17.7)
HGB UR QL STRIP: NEGATIVE
HIV 1+2 AB+HIV1 P24 AG SERPL QL IA: NONREACTIVE
HYALINE CASTS #/AREA URNS LPF: 1 /LPF (ref 0–2)
IMM GRANULOCYTES # BLD: 0 10E9/L (ref 0–0.4)
IMM GRANULOCYTES NFR BLD: 0.6 %
IRON SATN MFR SERPL: 25 % (ref 15–46)
IRON SERPL-MCNC: 83 UG/DL (ref 35–180)
KETONES UR STRIP-MCNC: 5 MG/DL
LDLC SERPL CALC-MCNC: 66 MG/DL
LEUKOCYTE ESTERASE UR QL STRIP: NEGATIVE
LYMPHOCYTES # BLD AUTO: 1.5 10E9/L (ref 0.8–5.3)
LYMPHOCYTES NFR BLD AUTO: 27.3 %
MCH RBC QN AUTO: 31.1 PG (ref 26.5–33)
MCHC RBC AUTO-ENTMCNC: 34 G/DL (ref 31.5–36.5)
MCV RBC AUTO: 91 FL (ref 78–100)
MEV IGG SER QL IA: 1.3 AI (ref 0–0.8)
MONOCYTES # BLD AUTO: 0.8 10E9/L (ref 0–1.3)
MONOCYTES NFR BLD AUTO: 14.5 %
MUCOUS THREADS #/AREA URNS LPF: PRESENT /LPF
MUV IGG SER QL IA: 0.6 AI (ref 0–0.8)
NEUTROPHILS # BLD AUTO: 3.1 10E9/L (ref 1.6–8.3)
NEUTROPHILS NFR BLD AUTO: 56.3 %
NITRATE UR QL: NEGATIVE
NONHDLC SERPL-MCNC: 99 MG/DL
NRBC # BLD AUTO: 0 10*3/UL
NRBC BLD AUTO-RTO: 0 /100
PH UR STRIP: 5 PH (ref 5–7)
PLATELET # BLD AUTO: 224 10E9/L (ref 150–450)
POTASSIUM SERPL-SCNC: 3.6 MMOL/L (ref 3.4–5.3)
PROT SERPL-MCNC: 8 G/DL (ref 6.8–8.8)
RBC # BLD AUTO: 5.11 10E12/L (ref 4.4–5.9)
RBC #/AREA URNS AUTO: 0 /HPF (ref 0–2)
RUBV IGG SERPL IA-ACNC: 22 IU/ML
SODIUM SERPL-SCNC: 139 MMOL/L (ref 133–144)
SOURCE: ABNORMAL
SP GR UR STRIP: 1.03 (ref 1–1.03)
SPECIMEN EXP DATE BLD: NORMAL
T PALLIDUM IGG+IGM SER QL: NEGATIVE
TIBC SERPL-MCNC: 335 UG/DL (ref 240–430)
TRIGL SERPL-MCNC: 163 MG/DL
URATE SERPL-MCNC: 5.8 MG/DL (ref 3.5–7.2)
UROBILINOGEN UR STRIP-MCNC: 0 MG/DL (ref 0–2)
WBC # BLD AUTO: 5.4 10E9/L (ref 4–11)
WBC #/AREA URNS AUTO: 1 /HPF (ref 0–2)

## 2017-09-08 PROCEDURE — 85025 COMPLETE CBC W/AUTO DIFF WBC: CPT | Performed by: INTERNAL MEDICINE

## 2017-09-08 PROCEDURE — 86803 HEPATITIS C AB TEST: CPT | Performed by: INTERNAL MEDICINE

## 2017-09-08 PROCEDURE — 86780 TREPONEMA PALLIDUM: CPT | Performed by: INTERNAL MEDICINE

## 2017-09-08 PROCEDURE — 86762 RUBELLA ANTIBODY: CPT | Performed by: INTERNAL MEDICINE

## 2017-09-08 PROCEDURE — 80053 COMPREHEN METABOLIC PANEL: CPT | Performed by: INTERNAL MEDICINE

## 2017-09-08 PROCEDURE — 83550 IRON BINDING TEST: CPT | Performed by: INTERNAL MEDICINE

## 2017-09-08 PROCEDURE — 86765 RUBEOLA ANTIBODY: CPT | Performed by: INTERNAL MEDICINE

## 2017-09-08 PROCEDURE — 36415 COLL VENOUS BLD VENIPUNCTURE: CPT | Performed by: INTERNAL MEDICINE

## 2017-09-08 PROCEDURE — 86735 MUMPS ANTIBODY: CPT | Performed by: INTERNAL MEDICINE

## 2017-09-08 PROCEDURE — 86706 HEP B SURFACE ANTIBODY: CPT | Performed by: INTERNAL MEDICINE

## 2017-09-08 PROCEDURE — 81001 URINALYSIS AUTO W/SCOPE: CPT | Performed by: INTERNAL MEDICINE

## 2017-09-08 PROCEDURE — 84550 ASSAY OF BLOOD/URIC ACID: CPT | Performed by: INTERNAL MEDICINE

## 2017-09-08 PROCEDURE — 84403 ASSAY OF TOTAL TESTOSTERONE: CPT | Performed by: INTERNAL MEDICINE

## 2017-09-08 PROCEDURE — 86901 BLOOD TYPING SEROLOGIC RH(D): CPT | Performed by: INTERNAL MEDICINE

## 2017-09-08 PROCEDURE — 86900 BLOOD TYPING SEROLOGIC ABO: CPT | Performed by: INTERNAL MEDICINE

## 2017-09-08 PROCEDURE — 87389 HIV-1 AG W/HIV-1&-2 AB AG IA: CPT | Performed by: INTERNAL MEDICINE

## 2017-09-08 PROCEDURE — 84270 ASSAY OF SEX HORMONE GLOBUL: CPT | Performed by: INTERNAL MEDICINE

## 2017-09-08 PROCEDURE — 80061 LIPID PANEL: CPT | Performed by: INTERNAL MEDICINE

## 2017-09-08 PROCEDURE — 83540 ASSAY OF IRON: CPT | Performed by: INTERNAL MEDICINE

## 2017-09-12 LAB
SHBG SERPL-SCNC: 21 NMOL/L (ref 11–80)
TESTOST FREE SERPL-MCNC: 15.68 NG/DL (ref 4.7–24.4)
TESTOST SERPL-MCNC: 581 NG/DL (ref 240–950)

## 2017-09-14 ENCOUNTER — OFFICE VISIT (OUTPATIENT)
Dept: PEDIATRICS | Facility: CLINIC | Age: 37
End: 2017-09-14
Payer: COMMERCIAL

## 2017-09-14 VITALS
HEART RATE: 77 BPM | HEIGHT: 73 IN | OXYGEN SATURATION: 96 % | TEMPERATURE: 98 F | DIASTOLIC BLOOD PRESSURE: 82 MMHG | WEIGHT: 257 LBS | SYSTOLIC BLOOD PRESSURE: 132 MMHG | BODY MASS INDEX: 34.06 KG/M2

## 2017-09-14 DIAGNOSIS — E34.9 TESTOSTERONE DEFICIENCY: ICD-10-CM

## 2017-09-14 DIAGNOSIS — Z11.3 SCREENING EXAMINATION FOR SEXUALLY TRANSMITTED DISEASE: ICD-10-CM

## 2017-09-14 DIAGNOSIS — N06.9 ISOLATED PROTEINURIA WITH MORPHOLOGIC LESION: ICD-10-CM

## 2017-09-14 DIAGNOSIS — Z23 NEED FOR HEPATITIS B VACCINATION: ICD-10-CM

## 2017-09-14 DIAGNOSIS — F51.01 PRIMARY INSOMNIA: ICD-10-CM

## 2017-09-14 DIAGNOSIS — Z23 NEED FOR PROPHYLACTIC VACCINATION AND INOCULATION AGAINST INFLUENZA: ICD-10-CM

## 2017-09-14 DIAGNOSIS — E78.5 HYPERLIPIDEMIA LDL GOAL <130: ICD-10-CM

## 2017-09-14 DIAGNOSIS — Z13.9 SCREENING FOR CONDITION: ICD-10-CM

## 2017-09-14 DIAGNOSIS — Z00.00 ROUTINE GENERAL MEDICAL EXAMINATION AT A HEALTH CARE FACILITY: Primary | ICD-10-CM

## 2017-09-14 LAB
ALBUMIN UR-MCNC: NEGATIVE MG/DL
APPEARANCE UR: CLEAR
BACTERIA #/AREA URNS HPF: ABNORMAL /HPF
BILIRUB UR QL STRIP: NEGATIVE
COLOR UR AUTO: YELLOW
GLUCOSE UR STRIP-MCNC: NEGATIVE MG/DL
HGB UR QL STRIP: NEGATIVE
KETONES UR STRIP-MCNC: NEGATIVE MG/DL
LEUKOCYTE ESTERASE UR QL STRIP: NEGATIVE
NITRATE UR QL: NEGATIVE
PH UR STRIP: 6 PH (ref 5–7)
RBC #/AREA URNS AUTO: ABNORMAL /HPF
SOURCE: ABNORMAL
SP GR UR STRIP: 1.02 (ref 1–1.03)
UROBILINOGEN UR STRIP-ACNC: 0.2 EU/DL (ref 0.2–1)
WBC #/AREA URNS AUTO: ABNORMAL /HPF

## 2017-09-14 PROCEDURE — 90472 IMMUNIZATION ADMIN EACH ADD: CPT | Performed by: STUDENT IN AN ORGANIZED HEALTH CARE EDUCATION/TRAINING PROGRAM

## 2017-09-14 PROCEDURE — 99395 PREV VISIT EST AGE 18-39: CPT | Mod: 25 | Performed by: STUDENT IN AN ORGANIZED HEALTH CARE EDUCATION/TRAINING PROGRAM

## 2017-09-14 PROCEDURE — 93000 ELECTROCARDIOGRAM COMPLETE: CPT | Performed by: STUDENT IN AN ORGANIZED HEALTH CARE EDUCATION/TRAINING PROGRAM

## 2017-09-14 PROCEDURE — 81001 URINALYSIS AUTO W/SCOPE: CPT | Performed by: INTERNAL MEDICINE

## 2017-09-14 PROCEDURE — 90746 HEPB VACCINE 3 DOSE ADULT IM: CPT | Performed by: STUDENT IN AN ORGANIZED HEALTH CARE EDUCATION/TRAINING PROGRAM

## 2017-09-14 PROCEDURE — 87591 N.GONORRHOEAE DNA AMP PROB: CPT | Mod: 59 | Performed by: INTERNAL MEDICINE

## 2017-09-14 PROCEDURE — 90471 IMMUNIZATION ADMIN: CPT | Performed by: STUDENT IN AN ORGANIZED HEALTH CARE EDUCATION/TRAINING PROGRAM

## 2017-09-14 PROCEDURE — 99173 VISUAL ACUITY SCREEN: CPT | Mod: 59 | Performed by: STUDENT IN AN ORGANIZED HEALTH CARE EDUCATION/TRAINING PROGRAM

## 2017-09-14 PROCEDURE — 87491 CHLMYD TRACH DNA AMP PROBE: CPT | Mod: 59 | Performed by: INTERNAL MEDICINE

## 2017-09-14 PROCEDURE — 90686 IIV4 VACC NO PRSV 0.5 ML IM: CPT | Performed by: STUDENT IN AN ORGANIZED HEALTH CARE EDUCATION/TRAINING PROGRAM

## 2017-09-14 RX ORDER — ZOLPIDEM TARTRATE 10 MG/1
10 TABLET ORAL
Qty: 90 TABLET | Refills: 1 | Status: SHIPPED | OUTPATIENT
Start: 2017-09-14 | End: 2017-11-22

## 2017-09-14 RX ORDER — ATORVASTATIN CALCIUM 40 MG/1
40 TABLET, FILM COATED ORAL DAILY
Qty: 90 TABLET | Refills: 1 | Status: SHIPPED | OUTPATIENT
Start: 2017-09-14 | End: 2018-01-02 | Stop reason: DRUGHIGH

## 2017-09-14 NOTE — PATIENT INSTRUCTIONS
Thank you for coming to clinic today. It was a pleasure to see you and I would be happy to see you back at any time for follow up or for new health issues.    1. Full physical exam completed today.  2. UA, GC/C rectal, throat, and urine sent today. Will notify you of any concerning results.   3. Hepatitis B booster and flu vaccine today.  4. EKG today - looks good.   5. Vision test was good - 20/25 in both eyes.     Preventive Health Recommendations  Male Ages 26 - 39    Yearly exam:             See your health care provider every year in order to  o   Review health changes.   o   Discuss preventive care.    o   Review your medicines if your doctor has prescribed any.    You should be tested each year for STDs (sexually transmitted diseases), if you re at risk.     After age 35, talk to your provider about cholesterol testing. If you are at risk for heart disease, have your cholesterol tested at least every 5 years.     If you are at risk for diabetes, you should have a diabetes test (fasting glucose).  Shots: Get a flu shot each year. Get a tetanus shot every 10 years.     Nutrition:    Eat at least 5 servings of fruits and vegetables daily.     Eat whole-grain bread, whole-wheat pasta and brown rice instead of white grains and rice.     Talk to your provider about Calcium and Vitamin D.     Lifestyle    Exercise for at least 150 minutes a week (30 minutes a day, 5 days a week). This will help you control your weight and prevent disease.     Limit alcohol to one drink per day.     No smoking.     Wear sunscreen to prevent skin cancer.     See your dentist every six months for an exam and cleaning.

## 2017-09-14 NOTE — NURSING NOTE
"Chief Complaint   Patient presents with     Physical       Initial /82 (BP Location: Right arm, Cuff Size: Adult Large)  Pulse 77  Temp 98  F (36.7  C) (Oral)  Ht 6' 1\" (1.854 m)  Wt 257 lb (116.6 kg)  SpO2 96%  BMI 33.91 kg/m2 Estimated body mass index is 33.91 kg/(m^2) as calculated from the following:    Height as of this encounter: 6' 1\" (1.854 m).    Weight as of this encounter: 257 lb (116.6 kg).  Medication Reconciliation: complete   Janee Birch MA    "

## 2017-09-14 NOTE — MR AVS SNAPSHOT
After Visit Summary   9/14/2017    Andrew Coulter    MRN: [id number]           Patient Information     Date Of Birth          1980        Visit Information        Provider Department      9/14/2017 4:00 PM Nhi Bhatia MD [facility] [address]        Today's Diagnoses     Screening for condition    -  1    Need for prophylactic vaccination and inoculation against influenza        Screening examination for sexually transmitted disease        Isolated proteinuria with morphologic lesion        Primary insomnia        Hyperlipidemia LDL goal <130        Testosterone deficiency          Care Instructions    Thank you for coming to clinic today. It was a pleasure to see you and I would be happy to see you back at any time for follow up or for new health issues.    1. Full physical exam completed today.  2. UA, GC/C rectal, throat, and urine sent today. Will notify you of any concerning results.   3. Hepatitis B booster and flu vaccine today.  4. EKG today - looks good.   5. Vision test was good - 20/25 in both eyes.     Preventive Health Recommendations  Male Ages 26 - 39    Yearly exam:             See your health care provider every year in order to  o   Review health changes.   o   Discuss preventive care.    o   Review your medicines if your doctor has prescribed any.    You should be tested each year for STDs (sexually transmitted diseases), if you re at risk.     After age 35, talk to your provider about cholesterol testing. If you are at risk for heart disease, have your cholesterol tested at least every 5 years.     If you are at risk for diabetes, you should have a diabetes test (fasting glucose).  Shots: Get a flu shot each year. Get a tetanus shot every 10 years.     Nutrition:    Eat at least 5 servings of fruits and vegetables daily.     Eat whole-grain bread, whole-wheat pasta and brown rice instead of white grains and rice.     Talk to your provider about Calcium and Vitamin D.      Lifestyle    Exercise for at least 150 minutes a week (30 minutes a day, 5 days a week). This will help you control your weight and prevent disease.     Limit alcohol to one drink per day.     No smoking.     Wear sunscreen to prevent skin cancer.     See your dentist every six months for an exam and cleaning.             Follow-ups after your visit        Your next 10 appointments already scheduled     Sep 15, 2017  8:30 AM CDT   US ABDOMEN COMPLETE with [id number]   [facility] ([facility])    [address]  [place] [facility] [place] [place] [address] [phone]           Please bring a list of your medicines (including vitamins, minerals and over-the-counter drugs). Also, tell your doctor about any allergies you may have. Wear comfortable clothes and leave your valuables at home.  Adults: No eating or drinking for 8 hours before the exam. You may take medicine with a small sip of water.  Children: - Children 6+ years: No food or drink for 6 hours before exam. - Children 1-5 years: No food or drink for 4 hours before exam. - Infants, breast-fed: may have breast milk up to 2 hours before exam. - Infants, formula: may have bottle until 4 hours before exam.  Please call the Imaging Department at your exam site with any questions.              Who to contact     If you have questions or need follow up information about today's clinic visit or your schedule please contact [facility] directly at [phone].  Normal or non-critical lab and imaging results will be communicated to you by MyChart, letter or phone within 4 business days after the clinic has received the results. If you do not hear from us within 7 days, please contact the clinic through [employer] or phone. If you have a critical or abnormal lab result, we will notify you by phone as soon as possible.  Submit refill requests through [employer] or call your pharmacy and they will forward the refill  "request to us. Please allow 3 business days for your refill to be completed.          Additional Information About Your Visit        [employer] Information     [employer] gives you secure access to your electronic health record. If you see a primary care provider, you can also send messages to your care team and make appointments. If you have questions, please call your primary care clinic.  If you do not have a primary care provider, please call [phone] and they will assist you.        Care EveryWhere ID     This is your Care EveryWhere ID. This could be used by other organizations to access your [facility] medical records  [id number]        Your Vitals Were     Pulse Temperature Height Pulse Oximetry BMI (Body Mass Index)       77 98  F (36.7  C) (Oral) 6' 1\" (1.854 m) 96% 33.91 kg/m2        Blood Pressure from Last 3 Encounters:   09/14/17 132/82   07/18/17 124/76   06/27/17 130/62    Weight from Last 3 Encounters:   09/14/17 257 lb (116.6 kg)   07/18/17 267 lb (121.1 kg)   06/27/17 269 lb (122 kg)              We Performed the Following     Chlamydia trachomatis PCR     Chlamydia trachomatis PCR     Chlamydia trachomatis PCR     EKG 12-lead complete w/read - Clinics     FLU VAC, SPLIT VIRUS IM > 3 YO (QUADRIVALENT) [94211]     Neisseria gonorrhoeae PCR     Neisseria gonorrhoeae PCR     Neisseria gonorrhoeae PCR     UA with Microscopic reflex to Culture     Vaccine Administration, Initial [12166]          Today's Medication Changes          These changes are accurate as of: 9/14/17  4:58 PM.  If you have any questions, ask your nurse or doctor.               These medicines have changed or have updated prescriptions.        Dose/Directions    * atorvastatin 20 MG tablet   Commonly known as:  LIPITOR   This may have changed:  Another medication with the same name was added. Make sure you understand how and when to take each.   Used for:  Hyperlipidemia LDL goal <130   Changed by:  Jasper Osullivan MD        " Dose:  20 mg   Take 1 tablet (20 mg) by mouth daily   Quantity:  90 tablet   Refills:  3       * atorvastatin 40 MG tablet   Commonly known as:  LIPITOR   This may have changed:  You were already taking a medication with the same name, and this prescription was added. Make sure you understand how and when to take each.   Used for:  Hyperlipidemia LDL goal <130   Changed by:  Nhi Bhatai MD        Dose:  40 mg   Take 1 tablet (40 mg) by mouth daily   Quantity:  90 tablet   Refills:  1       buPROPion 300 MG 24 hr tablet   Commonly known as:  WELLBUTRIN XL   This may have changed:  Another medication with the same name was removed. Continue taking this medication, and follow the directions you see here.   Used for:  Encounter for smoking cessation counseling   Changed by:  Jasper Osullivan MD        Dose:  300 mg   Take 1 tablet (300 mg) by mouth every morning   Quantity:  90 tablet   Refills:  1       * Notice:  This list has 2 medication(s) that are the same as other medications prescribed for you. Read the directions carefully, and ask your doctor or other care provider to review them with you.         Where to get your medicines      Some of these will need a paper prescription and others can be bought over the counter.  Ask your nurse if you have questions.     Bring a paper prescription for each of these medications     atorvastatin 40 MG tablet    zolpidem 10 MG tablet                Primary Care Provider Office Phone # Fax #    Jasper Osullivan [phone] [address] [place] [address]        Equal Access to Services     [facility] AH: Hadii theresa shepardo Soalannah, waaxda luqadaha, qaybta kaalmada adenicolayada, alfred dowling . So [facility] [phone].    ATENCIÓN: Si habla español, tiene a singh disposición servicios gratuitos de asistencia lingüística. Llame al [phone].    We comply with applicable federal civil rights laws and [place]  "laws. We do not discriminate on the basis of race, color, national origin, age, disability sex, sexual orientation or gender identity.            Thank you!     Thank you for choosing [facility]  for your care. Our goal is always to provide you with excellent care. Hearing back from our patients is one way we can continue to improve our services. Please take a few minutes to complete the written survey that you may receive in the mail after your visit with us. Thank you!             Your Updated Medication List - Protect others around you: Learn how to safely use, store and throw away your medicines at www.disposemymeds.org.          This list is accurate as of: 9/14/17  4:58 PM.  Always use your most recent med list.                   Brand Name Dispense Instructions for use Diagnosis    aspirin 81 MG tablet      Take by mouth daily        * atorvastatin 20 MG tablet    LIPITOR    90 tablet    Take 1 tablet (20 mg) by mouth daily    Hyperlipidemia LDL goal <130       * atorvastatin 40 MG tablet    LIPITOR    90 tablet    Take 1 tablet (40 mg) by mouth daily    Hyperlipidemia LDL goal <130       buPROPion 300 MG 24 hr tablet    WELLBUTRIN XL    90 tablet    Take 1 tablet (300 mg) by mouth every morning    Encounter for smoking cessation counseling       EPINEPHrine 0.3 MG/0.3ML injection 2-pack    EPIPEN/ADRENACLICK/or ANY BX GENERIC EQUIV    0.6 mL    Inject 0.3 mLs (0.3 mg) into the muscle once as needed for anaphylaxis    Bee allergy status       Needle (Disp) 22G X 1-1/2\" Misc     25 each    Use 1 needle per injection.    Testosterone deficiency       OMEGA-3 FISH OIL PO           omeprazole 20 MG CR capsule    priLOSEC    90 capsule    Take 1 capsule (20 mg) by mouth daily    Gastroesophageal reflux disease without esophagitis       testosterone cypionate 200 MG/ML injection    DEPOTESTOTERONE    10 mL    Inject 0.5 mLs (100 mg) into the muscle once a week    Testosterone deficiency       TRUVADA " 200-300 MG per tablet   Generic drug:  emtricitabine-tenofovir     90 tablet    TAKE ONE TABLET BY MOUTH EVERY DAY    Exposure to human immunodeficiency virus       VITAMIN D PO           zolpidem 10 MG tablet    AMBIEN    90 tablet    Take 1 tablet (10 mg) by mouth nightly as needed for sleep    Primary insomnia       * Notice:  This list has 2 medication(s) that are the same as other medications prescribed for you. Read the directions carefully, and ask your doctor or other care provider to review them with you.

## 2017-09-14 NOTE — PROGRESS NOTES
SUBJECTIVE:   CC: Andrew Coulter is an 36 year old male who presents for preventative health visit.     Physical   Annual:     Getting at least 3 servings of Calcium per day::  Yes    Bi-annual eye exam::  Yes    Dental care twice a year::  Yes    Sleep apnea or symptoms of sleep apnea::  None    Diet::  Regular (no restrictions)    Frequency of exercise::  2-3 days/week    Duration of exercise::  30-45 minutes    Taking medications regularly::  Yes    Medication side effects::  None    Additional concerns today::  No    Additional concerns:    Traveling to [facility] in February 2018 and has list of tests, imaging, and physical exam required for travel. Also will need refills of meds to take with him.   Otherwise he has been in good health. Has lost 10 lbs over the last two months intentionally. Has been working on diet and exercise.     Today's PHQ-2 Score:   PHQ-2 ( 1999 Pf[employer]) 9/10/2017   Q1: Little interest or pleasure in doing things 0   Q2: Feeling down, depressed or hopeless 0   PHQ-2 Score 0   Q1: Little interest or pleasure in doing things Not at all   Q2: Feeling down, depressed or hopeless Not at all   PHQ-2 Score 0       Abuse: Current or Past(Physical, Sexual or Emotional)- No  Do you feel safe in your environment - Yes    Social History   Substance Use Topics     Smoking status: Former Smoker     Packs/day: 0.50     Years: 12.00     Types: Cigarettes     Quit date: 4/11/2016     Smokeless tobacco: Never Used      Comment: using Chantix     Alcohol use 0.0 oz/week     0 Standard drinks or equivalent per week      Comment: 3-4 times per month, 1-2 drinks     The patient does not drink >3 drinks per day nor >7 drinks per week.    Reviewed orders with patient. Reviewed health maintenance and updated orders accordingly - Yes  BP Readings from Last 3 Encounters:   09/14/17 132/82   07/18/17 124/76   06/27/17 130/62    Wt Readings from Last 3 Encounters:   09/14/17 257 lb (116.6 kg)   07/18/17 267 lb (121.1  kg)   06/27/17 269 lb (122 kg)                  Patient Active Problem List   Diagnosis     Overweight     ABRAM (obstructive sleep apnea)     PReP therapy     Anxiety     Low testosterone     Mixed hyperlipidemia     Testosterone deficiency     Cervicalgia     Poor posture     Acquired postural kyphosis     Muscle spasm     Thoracic segment dysfunction     Past Surgical History:   Procedure Laterality Date     HERNIA REPAIR       SEPTOPLASTY         Social History   Substance Use Topics     Smoking status: Former Smoker     Packs/day: 0.50     Years: 12.00     Types: Cigarettes     Quit date: 4/11/2016     Smokeless tobacco: Never Used      Comment: using Chantix     Alcohol use 0.0 oz/week     0 Standard drinks or equivalent per week      Comment: 3-4 times per month, 1-2 drinks     Family History   Problem Relation Age of Onset     Hyperlipidemia Mother      Depression Mother      Sleep Apnea Mother      Hyperlipidemia Father      Anxiety Disorder Father      Substance Abuse Father      Hypertension Maternal Grandmother      Anxiety Disorder Maternal Grandmother      Hypertension Maternal Grandfather      DIABETES Maternal Grandfather      Substance Abuse Paternal Grandfather      CEREBROVASCULAR DISEASE Paternal Grandfather          Current Outpatient Prescriptions   Medication Sig Dispense Refill     zolpidem (AMBIEN) 10 MG tablet Take 1 tablet (10 mg) by mouth nightly as needed for sleep 90 tablet 1     atorvastatin (LIPITOR) 40 MG tablet Take 1 tablet (40 mg) by mouth daily 90 tablet 1     TRUVADA 200-300 MG per tablet TAKE ONE TABLET BY MOUTH EVERY DAY 90 tablet 0     testosterone cypionate (DEPOTESTOTERONE) 200 MG/ML injection Inject 0.5 mLs (100 mg) into the muscle once a week 10 mL 3     buPROPion (WELLBUTRIN XL) 300 MG 24 hr tablet Take 1 tablet (300 mg) by mouth every morning 90 tablet 1     atorvastatin (LIPITOR) 20 MG tablet Take 1 tablet (20 mg) by mouth daily 90 tablet 3     omeprazole (PRILOSEC) 20  "MG capsule Take 1 capsule (20 mg) by mouth daily 90 capsule 3     aspirin 81 MG tablet Take by mouth daily       Omega-3 Fatty Acids (OMEGA-3 FISH OIL PO)        Needle, Disp, 22G X 1-1/2\" MISC Use 1 needle per injection. 25 each 1     EPINEPHrine (EPIPEN) 0.3 MG/0.3ML injection Inject 0.3 mLs (0.3 mg) into the muscle once as needed for anaphylaxis 0.6 mL 1     Cholecalciferol (VITAMIN D PO)        Allergies   Allergen Reactions     Penicillins Anaphylaxis     Clindamycin      Fenofibrate Muscle Pain (Myalgia)           Reviewed and updated as needed this visit by clinical staff         Reviewed and updated as needed this visit by Provider        History reviewed. No pertinent past medical history.   Past Surgical History:   Procedure Laterality Date     HERNIA REPAIR       SEPTOPLASTY         ROS:  C: NEGATIVE for fever, chills, POSITIVE for intentional change in weight  I: NEGATIVE for worrisome rashes, moles or lesions  E: NEGATIVE for vision changes or irritation  ENT: NEGATIVE for ear, mouth and throat problems  R: NEGATIVE for significant cough or SOB  CV: NEGATIVE for chest pain, palpitations or peripheral edema  GI: NEGATIVE for nausea, abdominal pain, heartburn, or change in bowel habits   male: negative for dysuria, hematuria, urethral discharge  M: NEGATIVE for significant arthralgias or myalgia  N: NEGATIVE for weakness, dizziness or paresthesias  P: NEGATIVE for changes in mood or affect    OBJECTIVE:   /82 (BP Location: Right arm, Cuff Size: Adult Large)  Pulse 77  Temp 98  F (36.7  C) (Oral)  Ht 6' 1\" (1.854 m)  Wt 257 lb (116.6 kg)  SpO2 96%  BMI 33.91 kg/m2    GENERAL: healthy, alert and no distress  EYES: Eyes grossly normal to inspection, EOMI, PERRL, and conjunctivae and sclerae normal  HENT: ear canals and TM's normal, nose and mouth without ulcers or lesions  NECK: no adenopathy, no asymmetry, masses, or scars and thyroid normal to palpation  RESP: lungs clear to auscultation - no " rales, rhonchi or wheezes  CV: regular rate and rhythm, normal S1 S2, no murmur, no peripheral edema and peripheral pulses strong  ABDOMEN: soft, nontender, no hepatosplenomegaly, no masses and bowel sounds normal  : Single testicle (baseline), no inguinal hernias, no other abnormalities noted on exam  RECTAL: no external hemorrhoids or fissures  MS: no gross musculoskeletal defects noted, no edema  SKIN: no suspicious lesions or rashes  NEURO: Normal strength and tone, mentation intact and speech normal  PSYCH: mentation appears normal, affect normal/bright      ASSESSMENT/PLAN:   1. Screening for condition  Required for upcoming travel.  - EKG 12-lead complete w/read - Clinics: normal EKG  - Vision test pass, 20/25 in both eyes    2. Screening examination for sexually transmitted disease  - Neisseria gonorrhoeae PCR (rectal, throat, urine)  - Chlamydia trachomatis PCR (rectal, throat, urine)    3. Isolated proteinuria with morphologic lesion  - UA with Microscopic reflex to Culture    4. Primary insomnia  Refill.  - zolpidem (AMBIEN) 10 MG tablet; Take 1 tablet (10 mg) by mouth nightly as needed for sleep  Dispense: 90 tablet; Refill: 1    5. Hyperlipidemia LDL goal <130  Refill.  - atorvastatin (LIPITOR) 40 MG tablet; Take 1 tablet (40 mg) by mouth daily  Dispense: 90 tablet; Refill: 1    6. Testosterone deficiency  Continue injections.    7. Need for hepatitis B vaccination  Not immune, needs booster.  - HEPATITIS B VACCINE, ADULT, IM    8. Need for prophylactic vaccination and inoculation against influenza  - FLU VAC, SPLIT VIRUS IM > 3 YO (QUADRIVALENT) [91321]  - Vaccine Administration, Initial [51016]    MMR done at work last week.    COUNSELING:   Reviewed preventive health counseling, as reflected in patient instructions  Special attention given to:        Regular exercise       Healthy diet/nutrition       Vision screening       Immunizations    Vaccinated for: Hepatitis B and Influenza           Safe  "sex practices/STD prevention       reports that he quit smoking about 17 months ago. His smoking use included Cigarettes. He has a 6.00 pack-year smoking history. He has never used smokeless tobacco.  Estimated body mass index is 35.23 kg/(m^2) as calculated from the following:    Height as of 7/18/17: 6' 1\" (1.854 m).    Weight as of 7/18/17: 267 lb (121.1 kg).   Weight management plan: Discussed healthy diet and exercise guidelines and patient will follow up in 12 months in clinic to re-evaluate.    Counseling Resources:  ATP IV Guidelines  Pooled Cohorts Equation Calculator  FRAX Risk Assessment  ICSI Preventive Guidelines  Dietary Guidelines for Americans, 2010  [employer]'s MyPlate  ASA Prophylaxis  Lung CA Screening    Patient was seen and discussed with attending, Dr. Jasper Osullivan, who agrees with the above assessment and plan.    Amrita Bhatia MD  PGY - 2   Internal Medicine/Pediatrics  Pager [phone]  [facility] Influenza Immunization Documentation    1.  Are you sick today? (Fever of 100.5 or higher on the day of the clinic)   No    2.  Have you ever had Guillain-[place] Syndrome within 6 weeks of an influenza vaccionation?  No    3. Do you have a life-threatening allergy to eggs?  No    4. Do you have a life-threatening allergy to a component of the vaccine? May include antibiotics, gelatin or latex.  No     5. Have you ever had a reaction to a dose of flu vaccine that needed immediate medical attention?  No     Form completed by Janee Birch MA    I have seen and discussed this patient with Dr. Bhatia and agree with joint documentation as noted above.    Jasper Osullivan MD  Attending Internal Medicine/Pediatrics Physician        "

## 2017-09-14 NOTE — NURSING NOTE
VISION   No corrective lenses  Tool used: Rikki   Right eye:        10/12.5 (20/25)  Left eye:          10/12.5 (20/25)  Visual Acuity: Pass

## 2017-09-15 ENCOUNTER — HOSPITAL ENCOUNTER (OUTPATIENT)
Dept: ULTRASOUND IMAGING | Facility: CLINIC | Age: 37
Discharge: HOME | End: 2017-09-15
Attending: INTERNAL MEDICINE | Admitting: INTERNAL MEDICINE
Payer: COMMERCIAL

## 2017-09-15 DIAGNOSIS — Z71.84 TRAVEL ADVICE ENCOUNTER: ICD-10-CM

## 2017-09-15 PROCEDURE — 76700 US EXAM ABDOM COMPLETE: CPT

## 2017-09-17 LAB
C TRACH DNA SPEC QL NAA+PROBE: NEGATIVE
N GONORRHOEA DNA SPEC QL NAA+PROBE: NEGATIVE
SPECIMEN SOURCE: NORMAL

## 2017-09-30 ENCOUNTER — MYC REFILL (OUTPATIENT)
Dept: PEDIATRICS | Facility: CLINIC | Age: 37
End: 2017-09-30

## 2017-09-30 DIAGNOSIS — Z91.030 BEE ALLERGY STATUS: ICD-10-CM

## 2017-10-03 RX ORDER — EPINEPHRINE 0.3 MG/.3ML
0.3 INJECTION SUBCUTANEOUS
Qty: 0.6 ML | Refills: 1 | Status: SHIPPED | OUTPATIENT
Start: 2017-10-03 | End: 2021-07-08

## 2017-10-03 NOTE — TELEPHONE ENCOUNTER
Epipen 0.3 MG/0.3 ML      Last Written Prescription Date: 7/8/16  Last Fill Quantity: 0.6 ml,  # refills: 1   Last Office Visit with [facility], [facility] or [facility] prescribing provider: 9/14/17                                           Prescription approved per [facility] Refill Protocol.    Cielo Argueta RN

## 2017-10-03 NOTE — TELEPHONE ENCOUNTER
Message from MyChart:  Original authorizing provider: Jasper Osullivan MD, MD Andrew Coulter would like a refill of the following medications:  EPINEPHrine (EPIPEN) 0.3 MG/0.3ML injection [Jasper Osullivan MD, MD]    Preferred pharmacy: [place], [place] - [facility] [address]    Comment:

## 2017-10-11 ENCOUNTER — MYC MEDICAL ADVICE (OUTPATIENT)
Dept: PEDIATRICS | Facility: CLINIC | Age: 37
End: 2017-10-11

## 2017-10-11 NOTE — LETTER
October 12, 2017      Andrew Coulter  [address]  [place] [address]        To Whom It May Concern:    Andrew Coulter is currently under my care. He has no medical contraindications for travel/year stay in [facility]. He has been on a stable dose of testosterone without any concerns or need for labs. He has been testosterone injections 100 mg since June 2017 and liver functions, hemoglobin, and testosterone levels on this have been normal. He has a single testicle and thus required supplementation. See attached labs.     This treatment does not interfere with ability to work or do his job.      Sincerely,        Jasper Osullivan MD, MD

## 2017-10-12 ENCOUNTER — MYC MEDICAL ADVICE (OUTPATIENT)
Dept: PEDIATRICS | Facility: CLINIC | Age: 37
End: 2017-10-12

## 2017-10-12 NOTE — TELEPHONE ENCOUNTER
Pt sent an attachment on what his work is requesting regarding the clearance letter. Printed off the attachment & placed in 's in-basket to review.     Jamal, RN  Triage Nurse

## 2017-10-16 ENCOUNTER — OFFICE VISIT (OUTPATIENT)
Dept: URGENT CARE | Facility: URGENT CARE | Age: 37
End: 2017-10-16
Payer: COMMERCIAL

## 2017-10-16 VITALS
TEMPERATURE: 98 F | HEART RATE: 80 BPM | WEIGHT: 257 LBS | OXYGEN SATURATION: 98 % | DIASTOLIC BLOOD PRESSURE: 90 MMHG | BODY MASS INDEX: 33.91 KG/M2 | SYSTOLIC BLOOD PRESSURE: 136 MMHG

## 2017-10-16 DIAGNOSIS — H10.31 ACUTE CONJUNCTIVITIS OF RIGHT EYE, UNSPECIFIED ACUTE CONJUNCTIVITIS TYPE: ICD-10-CM

## 2017-10-16 DIAGNOSIS — J20.9 ACUTE BRONCHITIS WITH SYMPTOMS > 10 DAYS: ICD-10-CM

## 2017-10-16 DIAGNOSIS — J01.90 ACUTE SINUSITIS WITH SYMPTOMS > 10 DAYS: Primary | ICD-10-CM

## 2017-10-16 PROCEDURE — 99213 OFFICE O/P EST LOW 20 MIN: CPT | Performed by: PHYSICIAN ASSISTANT

## 2017-10-16 RX ORDER — POLYMYXIN B SULFATE AND TRIMETHOPRIM 1; 10000 MG/ML; [USP'U]/ML
1 SOLUTION OPHTHALMIC
Qty: 1 BOTTLE | Refills: 0 | Status: SHIPPED | OUTPATIENT
Start: 2017-10-16 | End: 2017-10-23

## 2017-10-16 RX ORDER — BENZONATATE 200 MG/1
200 CAPSULE ORAL 3 TIMES DAILY PRN
Qty: 21 CAPSULE | Refills: 0 | Status: SHIPPED | OUTPATIENT
Start: 2017-10-16 | End: 2018-01-02

## 2017-10-16 RX ORDER — DOXYCYCLINE 100 MG/1
100 CAPSULE ORAL 2 TIMES DAILY
Qty: 20 CAPSULE | Refills: 0 | Status: SHIPPED | OUTPATIENT
Start: 2017-10-16 | End: 2017-10-26

## 2017-10-16 ASSESSMENT — ENCOUNTER SYMPTOMS
WHEEZING: 0
CHILLS: 0
HEMOPTYSIS: 0
WEAKNESS: 0
SHORTNESS OF BREATH: 0
FEVER: 0
SORE THROAT: 1
COUGH: 1
SINUS PAIN: 1

## 2017-10-16 NOTE — PROGRESS NOTES
"SUBJECTIVE:   Andrew Coulter is a 36 year old male presenting with a chief complaint of   Chief Complaint   Patient presents with     Urgent Care     URI     Pt states has cough, congestion, and fatigue x 1 month. Pt has used otc medications.      Onset of symptoms was 1 month(s) ago.  Course of illness is worsening.    Severity moderate  Current and Associated symptoms: stuffy nose, cough - productive, sore throat and fatigue  Treatment measures tried include OTC Cough med and Fluids, Sudafed and Allegra  Predisposing factors include None.    Patient does not have a history of seasonal allergies, he is a \"sometimes smoker\"      Review of Systems   Constitutional: Positive for malaise/fatigue. Negative for chills and fever.   HENT: Positive for congestion, sinus pain and sore throat.    Respiratory: Positive for cough. Negative for hemoptysis, shortness of breath and wheezing.    Cardiovascular: Negative for chest pain.   Skin: Negative for rash.   Neurological: Negative for weakness.         No past medical history on file.  Current Outpatient Prescriptions   Medication Sig Dispense Refill     doxycycline (VIBRAMYCIN) 100 MG capsule Take 1 capsule (100 mg) by mouth 2 times daily for 10 days 20 capsule 0     benzonatate (TESSALON) 200 MG capsule Take 1 capsule (200 mg) by mouth 3 times daily as needed for cough 21 capsule 0     trimethoprim-polymyxin b (POLYTRIM) ophthalmic solution Apply 1 drop to eye every 3 hours for 7 days 1 Bottle 0     EPINEPHrine (EPIPEN/ADRENACLICK/OR ANY BX GENERIC EQUIV) 0.3 MG/0.3ML injection 2-pack Inject 0.3 mLs (0.3 mg) into the muscle once as needed for anaphylaxis 0.6 mL 1     zolpidem (AMBIEN) 10 MG tablet Take 1 tablet (10 mg) by mouth nightly as needed for sleep 90 tablet 1     atorvastatin (LIPITOR) 40 MG tablet Take 1 tablet (40 mg) by mouth daily 90 tablet 1     TRUVADA 200-300 MG per tablet TAKE ONE TABLET BY MOUTH EVERY DAY 90 tablet 0     testosterone cypionate " "(DEPOTESTOTERONE) 200 MG/ML injection Inject 0.5 mLs (100 mg) into the muscle once a week 10 mL 3     buPROPion (WELLBUTRIN XL) 300 MG 24 hr tablet Take 1 tablet (300 mg) by mouth every morning 90 tablet 1     Needle, Disp, 22G X 1-1/2\" MISC Use 1 needle per injection. 25 each 1     atorvastatin (LIPITOR) 20 MG tablet Take 1 tablet (20 mg) by mouth daily 90 tablet 3     omeprazole (PRILOSEC) 20 MG capsule Take 1 capsule (20 mg) by mouth daily 90 capsule 3     aspirin 81 MG tablet Take by mouth daily       Omega-3 Fatty Acids (OMEGA-3 FISH OIL PO)        Cholecalciferol (VITAMIN D PO)        Social History   Substance Use Topics     Smoking status: Former Smoker     Packs/day: 0.50     Years: 12.00     Types: Cigarettes     Quit date: 4/11/2016     Smokeless tobacco: Never Used      Comment: using Chantix     Alcohol use 0.0 oz/week     0 Standard drinks or equivalent per week      Comment: 3-4 times per month, 1-2 drinks       OBJECTIVE  /90 (BP Location: Right arm, Patient Position: Chair, Cuff Size: Adult Large)  Pulse 80  Temp 98  F (36.7  C) (Tympanic)  Wt 257 lb (116.6 kg)  SpO2 98%  BMI 33.91 kg/m2    Physical Exam   Constitutional: He is well-developed, well-nourished, and in no distress. No distress.   HENT:   Head: Normocephalic and atraumatic.   Right Ear: Tympanic membrane and ear canal normal.   Left Ear: Tympanic membrane and ear canal normal.   Nose: Rhinorrhea present. Right sinus exhibits no maxillary sinus tenderness and no frontal sinus tenderness. Left sinus exhibits no maxillary sinus tenderness and no frontal sinus tenderness.   Mouth/Throat: Uvula is midline and oropharynx is clear and moist.   Eyes: Conjunctivae and EOM are normal. Pupils are equal, round, and reactive to light.   Cardiovascular: Normal rate, regular rhythm and normal heart sounds.    Pulmonary/Chest: Breath sounds normal.   Lymphadenopathy:     He has cervical adenopathy.        Right cervical: Superficial cervical " adenopathy present.   Skin: He is not diaphoretic.       Labs:  No results found for this or any previous visit (from the past 24 hour(s)).    X-Ray was not done.    ASSESSMENT/PLAN:      ICD-10-CM    1. Acute sinusitis with symptoms > 10 days J01.90 doxycycline (VIBRAMYCIN) 100 MG capsule     benzonatate (TESSALON) 200 MG capsule   2. Acute bronchitis with symptoms > 10 days J20.9 doxycycline (VIBRAMYCIN) 100 MG capsule     benzonatate (TESSALON) 200 MG capsule   3. Acute conjunctivitis of right eye, unspecified acute conjunctivitis type H10.31 trimethoprim-polymyxin b (POLYTRIM) ophthalmic solution      Start eye drops only if symptoms worsen, rather than improve.    Followup:    In 5 day(s) follow up with with your Primary Care Provider if no improvement in symptoms    Colleen Bartholomew PA-C

## 2017-10-16 NOTE — NURSING NOTE
"Chief Complaint   Patient presents with     Urgent Care     URI     Pt states has cough, congestion, and fatigue x 1 month. Pt has used otc medications.        Initial /90 (BP Location: Right arm, Patient Position: Chair, Cuff Size: Adult Large)  Pulse 80  Temp 98  F (36.7  C) (Tympanic)  Wt 257 lb (116.6 kg)  SpO2 98%  BMI 33.91 kg/m2 Estimated body mass index is 33.91 kg/(m^2) as calculated from the following:    Height as of 9/14/17: 6' 1\" (1.854 m).    Weight as of this encounter: 257 lb (116.6 kg).  Medication Reconciliation: unable or not appropriate to perform   Ezra Toth CMA (AAMA) 10/16/2017 2:27 PM    "

## 2017-10-16 NOTE — MR AVS SNAPSHOT
After Visit Summary   10/16/2017    Andrew Coulter    MRN: [id number]           Patient Information     Date Of Birth          1980        Visit Information        Provider Department      10/16/2017 2:20 PM Colleen Bartholomew PA-C [facility]        Today's Diagnoses     Acute sinusitis with symptoms > 10 days    -  1    Acute bronchitis with symptoms > 10 days        Acute conjunctivitis of right eye, unspecified acute conjunctivitis type           Follow-ups after your visit        Who to contact     If you have questions or need follow up information about today's clinic visit or your schedule please contact [facility] directly at [phone].  Normal or non-critical lab and imaging results will be communicated to you by Troovalhart, letter or phone within 4 business days after the clinic has received the results. If you do not hear from us within 7 days, please contact the clinic through Troovalhart or phone. If you have a critical or abnormal lab result, we will notify you by phone as soon as possible.  Submit refill requests through [email or web address] or call your pharmacy and they will forward the refill request to us. Please allow 3 business days for your refill to be completed.          Additional Information About Your Visit        MyChart Information     [email or web address] gives you secure access to your electronic health record. If you see a primary care provider, you can also send messages to your care team and make appointments. If you have questions, please call your primary care clinic.  If you do not have a primary care provider, please call [phone] and they will assist you.        Care EveryWhere ID     This is your Care EveryWhere ID. This could be used by other organizations to access your [facility] medical records  [id number]        Your Vitals Were     Pulse Temperature Pulse Oximetry BMI (Body Mass Index)          80 98  F (36.7  C) (Tympanic) 98% 33.91 kg/m2          Blood Pressure from Last 3 Encounters:   10/16/17 136/90   09/14/17 132/82   07/18/17 124/76    Weight from Last 3 Encounters:   10/16/17 257 lb (116.6 kg)   09/14/17 257 lb (116.6 kg)   07/18/17 267 lb (121.1 kg)              Today, you had the following     No orders found for display         Today's Medication Changes          These changes are accurate as of: 10/16/17  4:11 PM.  If you have any questions, ask your nurse or doctor.               Start taking these medicines.        Dose/Directions    benzonatate 200 MG capsule   Commonly known as:  TESSALON   Used for:  Acute sinusitis with symptoms > 10 days, Acute bronchitis with symptoms > 10 days   Started by:  Colleen Bartholoemw PA-C        Dose:  200 mg   Take 1 capsule (200 mg) by mouth 3 times daily as needed for cough   Quantity:  21 capsule   Refills:  0       doxycycline 100 MG capsule   Commonly known as:  VIBRAMYCIN   Used for:  Acute sinusitis with symptoms > 10 days, Acute bronchitis with symptoms > 10 days   Started by:  Colleen Bartholomew PA-C        Dose:  100 mg   Take 1 capsule (100 mg) by mouth 2 times daily for 10 days   Quantity:  20 capsule   Refills:  0       trimethoprim-polymyxin b ophthalmic solution   Commonly known as:  POLYTRIM   Used for:  Acute conjunctivitis of right eye, unspecified acute conjunctivitis type   Started by:  Colleen Bartholomew PA-C        Dose:  1 drop   Apply 1 drop to eye every 3 hours for 7 days   Quantity:  1 Bottle   Refills:  0            Where to get your medicines      These medications were sent to [facility] - [address]   [address]  [address], [place] [address]     Phone:  [phone]     benzonatate 200 MG capsule    doxycycline 100 MG capsule         Some of these will need a paper prescription and others can be bought over the counter.  Ask your nurse if you have questions.     Bring a paper prescription for each of these  medications     trimethoprim-polymyxin b ophthalmic solution                Primary Care Provider Office Phone # Fax #    Jasper Osullivan [phone] [address] [place] [address]        Equal Access to Services     SUZIE CARY : Cayla rondon devyno Soahmetali, waaxda luqadaha, qaybta kaalmada adenicolayada, alfred pickens laJadjuani law. So [facility] [phone].    ATENCIÓN: Si habla español, tiene a singh disposición servicios gratuitos de asistencia lingüística. Llame al [phone].    We comply with applicable federal civil rights laws and [place] laws. We do not discriminate on the basis of race, color, national origin, age, disability, sex, sexual orientation, or gender identity.            Thank you!     Thank you for choosing [facility]  for your care. Our goal is always to provide you with excellent care. Hearing back from our patients is one way we can continue to improve our services. Please take a few minutes to complete the written survey that you may receive in the mail after your visit with us. Thank you!             Your Updated Medication List - Protect others around you: Learn how to safely use, store and throw away your medicines at www.disposemymeds.org.          This list is accurate as of: 10/16/17  4:11 PM.  Always use your most recent med list.                   Brand Name Dispense Instructions for use Diagnosis    aspirin 81 MG tablet      Take by mouth daily        * atorvastatin 20 MG tablet    LIPITOR    90 tablet    Take 1 tablet (20 mg) by mouth daily    Hyperlipidemia LDL goal <130       * atorvastatin 40 MG tablet    LIPITOR    90 tablet    Take 1 tablet (40 mg) by mouth daily    Hyperlipidemia LDL goal <130       benzonatate 200 MG capsule    TESSALON    21 capsule    Take 1 capsule (200 mg) by mouth 3 times daily as needed for cough    Acute sinusitis with symptoms > 10 days, Acute bronchitis with symptoms > 10 days     "   buPROPion 300 MG 24 hr tablet    WELLBUTRIN XL    90 tablet    Take 1 tablet (300 mg) by mouth every morning    Encounter for smoking cessation counseling       doxycycline 100 MG capsule    VIBRAMYCIN    20 capsule    Take 1 capsule (100 mg) by mouth 2 times daily for 10 days    Acute sinusitis with symptoms > 10 days, Acute bronchitis with symptoms > 10 days       EPINEPHrine 0.3 MG/0.3ML injection 2-pack    EPIPEN/ADRENACLICK/or ANY BX GENERIC EQUIV    0.6 mL    Inject 0.3 mLs (0.3 mg) into the muscle once as needed for anaphylaxis    Bee allergy status       Needle (Disp) 22G X 1-1/2\" Misc     25 each    Use 1 needle per injection.    Testosterone deficiency       OMEGA-3 FISH OIL PO           omeprazole 20 MG CR capsule    priLOSEC    90 capsule    Take 1 capsule (20 mg) by mouth daily    Gastroesophageal reflux disease without esophagitis       testosterone cypionate 200 MG/ML injection    DEPOTESTOTERONE    10 mL    Inject 0.5 mLs (100 mg) into the muscle once a week    Testosterone deficiency       trimethoprim-polymyxin b ophthalmic solution    POLYTRIM    1 Bottle    Apply 1 drop to eye every 3 hours for 7 days    Acute conjunctivitis of right eye, unspecified acute conjunctivitis type       TRUVADA 200-300 MG per tablet   Generic drug:  emtricitabine-tenofovir     90 tablet    TAKE ONE TABLET BY MOUTH EVERY DAY    Exposure to human immunodeficiency virus       VITAMIN D PO           zolpidem 10 MG tablet    AMBIEN    90 tablet    Take 1 tablet (10 mg) by mouth nightly as needed for sleep    Primary insomnia       * Notice:  This list has 2 medication(s) that are the same as other medications prescribed for you. Read the directions carefully, and ask your doctor or other care provider to review them with you.      "

## 2017-10-30 ENCOUNTER — MYC MEDICAL ADVICE (OUTPATIENT)
Dept: PEDIATRICS | Facility: CLINIC | Age: 37
End: 2017-10-30

## 2017-10-30 NOTE — LETTER
Re: Andrew Coulter  : 1980      To Whom It May Concern,    Andrew is under my professional care for his health care needs. He is currently using testosterone. Due to his location, if for availability or concerns with testosterone, he may discontinue this at any time without concern to his health or considerable harm.   This would not be detrimental to his health to not be on this.     If you have any questions or concerns please do not hesitate to contact me.      Sincerely,        Jasper Osullivan MD

## 2017-10-31 ENCOUNTER — MYC MEDICAL ADVICE (OUTPATIENT)
Dept: PEDIATRICS | Facility: CLINIC | Age: 37
End: 2017-10-31

## 2017-11-09 ENCOUNTER — E-VISIT (OUTPATIENT)
Dept: PEDIATRICS | Facility: CLINIC | Age: 37
End: 2017-11-09
Payer: COMMERCIAL

## 2017-11-09 ENCOUNTER — HOSPITAL ENCOUNTER (OUTPATIENT)
Dept: LAB | Facility: CLINIC | Age: 37
Discharge: HOME | End: 2017-11-09
Attending: INTERNAL MEDICINE | Admitting: INTERNAL MEDICINE
Payer: COMMERCIAL

## 2017-11-09 ENCOUNTER — MYC MEDICAL ADVICE (OUTPATIENT)
Dept: PEDIATRICS | Facility: CLINIC | Age: 37
End: 2017-11-09

## 2017-11-09 DIAGNOSIS — Z20.2 EXPOSURE TO SYPHILIS: Primary | ICD-10-CM

## 2017-11-09 DIAGNOSIS — Z20.2 EXPOSURE TO SYPHILIS: ICD-10-CM

## 2017-11-09 PROCEDURE — 36415 COLL VENOUS BLD VENIPUNCTURE: CPT | Performed by: INTERNAL MEDICINE

## 2017-11-09 PROCEDURE — 86780 TREPONEMA PALLIDUM: CPT | Performed by: INTERNAL MEDICINE

## 2017-11-09 PROCEDURE — 99444 ZZC PHYSICIAN ONLINE EVALUATION & MANAGEMENT SERVICE: CPT | Performed by: INTERNAL MEDICINE

## 2017-11-09 RX ORDER — DOXYCYCLINE 100 MG/1
100 CAPSULE ORAL 2 TIMES DAILY
Qty: 28 CAPSULE | Refills: 0 | Status: SHIPPED | OUTPATIENT
Start: 2017-11-09 | End: 2017-11-23

## 2017-11-10 LAB — T PALLIDUM IGG+IGM SER QL: NEGATIVE

## 2017-11-16 ENCOUNTER — THERAPY VISIT (OUTPATIENT)
Dept: PHYSICAL THERAPY | Facility: CLINIC | Age: 37
End: 2017-11-16
Payer: COMMERCIAL

## 2017-11-16 DIAGNOSIS — M75.41 IMPINGEMENT SYNDROME, SHOULDER, RIGHT: Primary | ICD-10-CM

## 2017-11-16 PROCEDURE — 97110 THERAPEUTIC EXERCISES: CPT | Mod: GP | Performed by: PHYSICAL THERAPIST

## 2017-11-16 PROCEDURE — 97530 THERAPEUTIC ACTIVITIES: CPT | Mod: GP | Performed by: PHYSICAL THERAPIST

## 2017-11-16 PROCEDURE — 97161 PT EVAL LOW COMPLEX 20 MIN: CPT | Mod: GP | Performed by: PHYSICAL THERAPIST

## 2017-11-16 NOTE — LETTER
[facility]  [address]  [address]  [place] [address]  [phone]    2017    Re: Andrew Coulter   :   1980  MRN:  [id number]   REFERRING PHYSICIAN:   Maura HERNANDEZ MARLENY PT    Date of Initial Evaluation:  2017  Visits:  Rxs Used: 1  Reason for Referral:  Impingement syndrome, shoulder, right    EVALUATION SUMMARY    Andrew Coulter is a 36 year old male patient presenting to Physical Therapy with the following Primary Symptoms: Right sided shoulder pain along the top of the right shoulder.  He denies having related symptoms spreading to the upper arm, forearm, hand, or fingers. These pains are described as intermittent.   He denies having painful cough/sneeze, saddle anaesthesia, severe night pain, peripheral motor deficit, recent bowel/bladder change, recent vision change, ringing in the ears or pain with swallowing. Pain is rated 0/10 at rest, and 2/10 at worst. History of symptoms: These pains began suddenly two months ago (2017)  as the result of arm being landed on during rugby and pulled on.  Previous treatment has included nothing.   Since onset, these pains are stable :  Current Symptoms are worsened by: reaching overhead, behind head, pull up motion, sleeping arm tucked R, reaching across body, reaching hand behind back. Current Symptoms are relieved by avoidance.   Recent surgical procedure: none    General health as reported by patient is:  good.  Pertinent medical history: none significant.  He denies any significant current illness or recent hospital admissions. He denies any regonal implanted devices.  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 Previous functional status:  fully functional prior to pain onset/injury.       Objective:  System    Shoulder Evaluation:  ROM:  AROM:    Flexion:  Right:  140  Abduction:  Right:  120  Internal Rotation:  Right:  Nl  External Rotation:  Right:  80  PROM:    Flexion:  Right: 160     Abduction:  Right:  140  External Rotation:  Right:  90  Pain: pain limits R shoulder fles, abd, ER motions  Endfeel: empty all planes R GHJ fles, abd, ER  Strength:    Flexion: Right: 4+/5      Pain:  +  Extension:  Right: 5/5     Pain:+  Abduction:  Right: 4/5      Pain:+  Adduction:  Right: 5/5      Pain:-  Internal Rotation:  Right: 4+/5      Pain:-  External Rotation:   Right:4/5      Pain:+    Horizontal Adduction:  Right:/5     Pain:-  Elbow Flexion:  Right:5/5      Pain:-  Elbow Extension:  Right:5/5      Pain:-        Re: Andrew Coulter   :   1980    Special Tests:    Right shoulder positive for the following special tests:Impingement (HK and Neer) and Rotator cuff tear  Right shoulder negative for the following special tests:Labral  Palpation:    Right shoulder tenderness present at: Supraspinatus     Assessment/Plan:      Patient is a 36 year old male with right side shoulder complaints.    Patient has the following significant findings with corresponding treatment plan.                Diagnosis 1:  RC dysfunction, LUCIUS  Pain -  hot/cold therapy, manual therapy, splint/taping/bracing/orthotics, self management, education, directional preference exercise and home program  Decreased strength - therapeutic exercise and therapeutic activities  Decreased proprioception - neuro re-education and therapeutic activities  Inflammation - cold therapy and self management/home program  Impaired muscle performance - neuro re-education  Decreased function - therapeutic activities    Therapy Evaluation Codes:   1) History comprised of:   Personal factors that impact the plan of care:      None.    Comorbidity factors that impact the plan of care are:      None.     Medications impacting care: None.  2) Examination of Body Systems comprised of:   Body structures and functions that impact the plan of care:      Shoulder.   Activity limitations that impact the plan of care are:      Cooking, Dressing, Grasping,  Lifting, Sports and Throwing.  3) Clinical presentation characteristics are:   Stable/Uncomplicated.  4) Decision-Making    Low complexity using standardized patient assessment instrument and/or measureable assessment of functional outcome.  Cumulative Therapy Evaluation is: Low complexity.    Previous and current functional limitations:  (See Goal Flow Sheet for this information)    Short term and Long term goals: (See Goal Flow Sheet for this information)   Communication ability:  Patient appears to be able to clearly communicate and understand verbal and written communication and follow directions correctly.  Treatment Explanation - The following has been discussed with the patient:   RX ordered/plan of care  Anticipated outcomes  Possible risks and side effects  This patient would benefit from PT intervention to resume normal activities.   Rehab potential is excellent.    Frequency:  1 X week, once daily  Duration:  for 8 weeks  Discharge Plan:  Achieve all LTG.  Independent in home treatment program.  Reach maximal therapeutic benefit.    Thank you for your referral.    INQUIRIES  Therapist: Paul Breyen, MA, PT, OCS, Cert, MDT  [facility]  [address] [address] [address]  Phone: [phone] / Fax: [phone]

## 2017-11-16 NOTE — PROGRESS NOTES
Andrew Coulter is a 36 year old male patient presenting to Physical Therapy with the following Primary Symptoms: Right sided shoulder pain along the top of the right shoulder.  He denies having related symptoms spreading to the upper arm, forearm, hand, or fingers. These pains are described as intermittent.   He denies having painful cough/sneeze, saddle anaesthesia, severe night pain, peripheral motor deficit, recent bowel/bladder change, recent vision change, ringing in the ears or pain with swallowing. Pain is rated 0/10 at rest, and 2/10 at worst. History of symptoms: These pains began suddenly two months ago (9/2017)  as the result of arm being landed on during rugby and pulled on.  Previous treatment has included nothing.   Since onset, these pains are stable :  Current Symptoms are worsened by: reaching overhead, behind head, pull up motion, sleeping arm tucked R, reaching across body, reaching hand behind back. Current Symptoms are relieved by avoidance.   Recent surgical procedure: none    General health as reported by patient is:  good.  Pertinent medical history: none significant.  He denies any significant current illness or recent hospital admissions. He denies any regonal implanted devices.  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 Previous functional status:  fully functional prior to pain onset/injury.            HPI                    Objective:    System                   Shoulder Evaluation:  ROM:  AROM:    Flexion:  Right:  140    Abduction:  Right:  120    Internal Rotation:  Right:  Nl  External Rotation:  Right:  80                PROM:    Flexion:  Right: 160      Abduction:  Right:  140      External Rotation:  Right:  90                Pain: pain limits R shoulder fles, abd, ER motions  Endfeel: empty all planes R GHJ fles, abd, ER  Strength:    Flexion: Right: 4+/5      Pain:  +  Extension:  Right: 5/5     Pain:+  Abduction:  Right: 4/5      Pain:+  Adduction:  Right: 5/5       Pain:-  Internal Rotation:  Right: 4+/5      Pain:-  External Rotation:   Right:4/5      Pain:+      Horizontal Adduction:  Right:/5     Pain:-  Elbow Flexion:  Right:5/5      Pain:-  Elbow Extension:  Right:5/5      Pain:-    Special Tests:      Right shoulder positive for the following special tests:Impingement (HK and Neer) and Rotator cuff tear  Right shoulder negative for the following special tests:Labral  Palpation:      Right shoulder tenderness present at: Supraspinatus                                     General     ROS    Assessment/Plan:      Patient is a 36 year old male with right side shoulder complaints.    Patient has the following significant findings with corresponding treatment plan.                Diagnosis 1:  RC dysfunction, LUCIUS  Pain -  hot/cold therapy, manual therapy, splint/taping/bracing/orthotics, self management, education, directional preference exercise and home program  Decreased strength - therapeutic exercise and therapeutic activities  Decreased proprioception - neuro re-education and therapeutic activities  Inflammation - cold therapy and self management/home program  Impaired muscle performance - neuro re-education  Decreased function - therapeutic activities    Therapy Evaluation Codes:   1) History comprised of:   Personal factors that impact the plan of care:      None.    Comorbidity factors that impact the plan of care are:      None.     Medications impacting care: None.  2) Examination of Body Systems comprised of:   Body structures and functions that impact the plan of care:      Shoulder.   Activity limitations that impact the plan of care are:      Cooking, Dressing, Grasping, Lifting, Sports and Throwing.  3) Clinical presentation characteristics are:   Stable/Uncomplicated.  4) Decision-Making    Low complexity using standardized patient assessment instrument and/or measureable assessment of functional outcome.  Cumulative Therapy Evaluation is: Low  complexity.    Previous and current functional limitations:  (See Goal Flow Sheet for this information)    Short term and Long term goals: (See Goal Flow Sheet for this information)     Communication ability:  Patient appears to be able to clearly communicate and understand verbal and written communication and follow directions correctly.  Treatment Explanation - The following has been discussed with the patient:   RX ordered/plan of care  Anticipated outcomes  Possible risks and side effects  This patient would benefit from PT intervention to resume normal activities.   Rehab potential is excellent.    Frequency:  1 X week, once daily  Duration:  for 8 weeks  Discharge Plan:  Achieve all LTG.  Independent in home treatment program.  Reach maximal therapeutic benefit.    Please refer to the daily flowsheet for treatment today, total treatment time and time spent performing 1:1 timed codes.

## 2017-11-17 PROBLEM — M75.41 IMPINGEMENT SYNDROME, SHOULDER, RIGHT: Status: ACTIVE | Noted: 2017-11-17

## 2017-11-22 ENCOUNTER — THERAPY VISIT (OUTPATIENT)
Dept: PHYSICAL THERAPY | Facility: CLINIC | Age: 37
End: 2017-11-22
Payer: COMMERCIAL

## 2017-11-22 ENCOUNTER — MYC REFILL (OUTPATIENT)
Dept: PEDIATRICS | Facility: CLINIC | Age: 37
End: 2017-11-22

## 2017-11-22 DIAGNOSIS — F51.01 PRIMARY INSOMNIA: ICD-10-CM

## 2017-11-22 DIAGNOSIS — M75.41 IMPINGEMENT SYNDROME, SHOULDER, RIGHT: ICD-10-CM

## 2017-11-22 DIAGNOSIS — E78.5 HYPERLIPIDEMIA LDL GOAL <130: ICD-10-CM

## 2017-11-22 DIAGNOSIS — Z20.6 EXPOSURE TO HUMAN IMMUNODEFICIENCY VIRUS: ICD-10-CM

## 2017-11-22 PROCEDURE — 97110 THERAPEUTIC EXERCISES: CPT | Mod: GP | Performed by: PHYSICAL THERAPIST

## 2017-11-22 PROCEDURE — 97014 ELECTRIC STIMULATION THERAPY: CPT | Mod: GP | Performed by: PHYSICAL THERAPIST

## 2017-11-22 PROCEDURE — 97010 HOT OR COLD PACKS THERAPY: CPT | Mod: GP | Performed by: PHYSICAL THERAPIST

## 2017-11-22 RX ORDER — EMTRICITABINE AND TENOFOVIR DISOPROXIL FUMARATE 200; 300 MG/1; MG/1
1 TABLET, FILM COATED ORAL DAILY
Qty: 90 TABLET | Refills: 0 | Status: CANCELLED | OUTPATIENT
Start: 2017-11-22

## 2017-11-22 RX ORDER — ZOLPIDEM TARTRATE 10 MG/1
10 TABLET ORAL
Qty: 90 TABLET | Refills: 1 | Status: SHIPPED | OUTPATIENT
Start: 2017-11-22 | End: 2019-06-30

## 2017-11-22 RX ORDER — ATORVASTATIN CALCIUM 20 MG/1
20 TABLET, FILM COATED ORAL DAILY
Qty: 90 TABLET | Refills: 2 | Status: SHIPPED | OUTPATIENT
Start: 2017-11-22 | End: 2019-08-05 | Stop reason: DRUGHIGH

## 2017-11-22 NOTE — TELEPHONE ENCOUNTER
Truvada      Last Written Prescription Date:  8/16/2017  Last Fill Quantity: 90,   # refills: 0  Future Office visit:       Routing refill request to provider for review/approval because:  Drug not on the [facility], [facility] or [facility] refill protocol or controlled substance    Ros RENTERIA RN, BSN, PHN  [place] [facility] RN

## 2017-11-22 NOTE — PROGRESS NOTES
Subjective:    HPI                    Objective:    System    Physical Exam    General     ROS    Assessment/Plan:      SUBJECTIVE  Subjective changes as noted by pt:  Pt continues to note pain in the right shoulder.  Pt feels more sore secondary to increased activity with exercises had an injection yesterday with no change in symptoms.      Current pain level: 2/10     Changes in function:    none  Adverse reaction to treatment or activity:  None    OBJECTIVE  Changes in objective findings:  Positive impingement test remains. Pain with resisted shoulder flexion abduction and ER        ASSESSMENT  Andrew continues to require intervention to meet STG and LTG's: PT  Patient is progressing as expected.  Response to therapy has shown lack of progress in  pain level and function  Progress made towards STG/LTG?  None    PLAN  The following modalities have been added:  electrical stimulation and hot/cold therapy    PTA/ATC plan:  N/A    Please refer to the daily flowsheet for treatment today, total treatment time and time spent performing 1:1 timed codes.

## 2017-11-22 NOTE — TELEPHONE ENCOUNTER
Atorvastatin     Last Written Prescription Date: 9/14/2017  Last Fill Quantity: 90, # refills: 1  Last Office Visit with [facility], [facility] or [facility] prescribing provider: 9/14/2017       Lab Results   Component Value Date    CHOL 135 09/08/2017     Lab Results   Component Value Date    HDL 36 09/08/2017     Lab Results   Component Value Date    LDL 66 09/08/2017     Lab Results   Component Value Date    TRIG 163 09/08/2017     No results found for: CHOLHDLRATIO    Prescription approved per [facility] Refill Protocol.    Ros RENTERIA RN, BSN, PHN  [place] [facility] RN

## 2017-11-22 NOTE — TELEPHONE ENCOUNTER
Zolpidem      Last Written Prescription Date:  9/14/2017  Last Fill Quantity: 90,   # refills: 1  Future Office visit:       Patient should have refills available. Notified him via [employer].    [place] reviewed:  reviewed- no concerns    [place]  [email or web address]    Last filled:  10/9/2017, #90    Ros RENTERIA RN, BSN, PHN  [place] RN

## 2017-11-22 NOTE — TELEPHONE ENCOUNTER
Message from MyChart:  Original authorizing provider: Jasper Osullivan MD, MD Andrew Coulter would like a refill of the following medications:  atorvastatin (LIPITOR) 20 MG tablet [Jasper Osullivan MD, MD]  TRUVADA 200-300 MG per tablet [Jasper Osullivan MD, MD]  zolpidem (AMBIEN) 10 MG tablet [Jasper Osullivan MD, MD]    Preferred pharmacy: [place], [place] - [facility] [address]    Comment:

## 2017-11-24 RX ORDER — EMTRICITABINE AND TENOFOVIR DISOPROXIL FUMARATE 200; 300 MG/1; MG/1
1 TABLET, FILM COATED ORAL DAILY
Qty: 90 TABLET | Refills: 0 | Status: SHIPPED | OUTPATIENT
Start: 2017-11-24 | End: 2019-06-30

## 2017-11-29 ENCOUNTER — THERAPY VISIT (OUTPATIENT)
Dept: PHYSICAL THERAPY | Facility: CLINIC | Age: 37
End: 2017-11-29
Payer: COMMERCIAL

## 2017-11-29 DIAGNOSIS — M75.41 IMPINGEMENT SYNDROME, SHOULDER, RIGHT: ICD-10-CM

## 2017-11-29 PROCEDURE — 97014 ELECTRIC STIMULATION THERAPY: CPT | Mod: GP | Performed by: PHYSICAL THERAPIST

## 2017-11-29 PROCEDURE — 97110 THERAPEUTIC EXERCISES: CPT | Mod: GP | Performed by: PHYSICAL THERAPIST

## 2017-11-29 PROCEDURE — 97112 NEUROMUSCULAR REEDUCATION: CPT | Mod: GP | Performed by: PHYSICAL THERAPIST

## 2017-12-14 ENCOUNTER — THERAPY VISIT (OUTPATIENT)
Dept: PHYSICAL THERAPY | Facility: CLINIC | Age: 37
End: 2017-12-14
Payer: COMMERCIAL

## 2017-12-14 DIAGNOSIS — M75.41 IMPINGEMENT SYNDROME, SHOULDER, RIGHT: ICD-10-CM

## 2017-12-14 PROCEDURE — 97140 MANUAL THERAPY 1/> REGIONS: CPT | Mod: GP | Performed by: PHYSICAL THERAPIST

## 2017-12-14 PROCEDURE — 97110 THERAPEUTIC EXERCISES: CPT | Mod: GP | Performed by: PHYSICAL THERAPIST

## 2017-12-21 ENCOUNTER — THERAPY VISIT (OUTPATIENT)
Dept: PHYSICAL THERAPY | Facility: CLINIC | Age: 37
End: 2017-12-21
Payer: COMMERCIAL

## 2017-12-21 DIAGNOSIS — M75.41 IMPINGEMENT SYNDROME, SHOULDER, RIGHT: ICD-10-CM

## 2017-12-21 PROCEDURE — 97140 MANUAL THERAPY 1/> REGIONS: CPT | Mod: GP | Performed by: PHYSICAL THERAPIST

## 2017-12-21 PROCEDURE — 97014 ELECTRIC STIMULATION THERAPY: CPT | Mod: GP | Performed by: PHYSICAL THERAPIST

## 2017-12-21 PROCEDURE — 97110 THERAPEUTIC EXERCISES: CPT | Mod: GP | Performed by: PHYSICAL THERAPIST

## 2018-01-02 ENCOUNTER — OFFICE VISIT (OUTPATIENT)
Dept: PEDIATRICS | Facility: CLINIC | Age: 38
End: 2018-01-02
Payer: COMMERCIAL

## 2018-01-02 VITALS
BODY MASS INDEX: 32.87 KG/M2 | HEART RATE: 96 BPM | WEIGHT: 248 LBS | HEIGHT: 73 IN | SYSTOLIC BLOOD PRESSURE: 134 MMHG | OXYGEN SATURATION: 96 % | DIASTOLIC BLOOD PRESSURE: 78 MMHG | TEMPERATURE: 98.6 F

## 2018-01-02 DIAGNOSIS — Z11.3 ROUTINE SCREENING FOR STI (SEXUALLY TRANSMITTED INFECTION): Primary | ICD-10-CM

## 2018-01-02 PROCEDURE — 36415 COLL VENOUS BLD VENIPUNCTURE: CPT | Performed by: INTERNAL MEDICINE

## 2018-01-02 PROCEDURE — 87389 HIV-1 AG W/HIV-1&-2 AB AG IA: CPT | Performed by: INTERNAL MEDICINE

## 2018-01-02 PROCEDURE — 86780 TREPONEMA PALLIDUM: CPT | Performed by: INTERNAL MEDICINE

## 2018-01-02 PROCEDURE — 87491 CHLMYD TRACH DNA AMP PROBE: CPT | Performed by: INTERNAL MEDICINE

## 2018-01-02 PROCEDURE — 87591 N.GONORRHOEAE DNA AMP PROB: CPT | Mod: 91 | Performed by: INTERNAL MEDICINE

## 2018-01-02 PROCEDURE — 99213 OFFICE O/P EST LOW 20 MIN: CPT | Performed by: INTERNAL MEDICINE

## 2018-01-02 NOTE — NURSING NOTE
"Chief Complaint   Patient presents with     STD       Initial /78 (BP Location: Right arm, Cuff Size: Adult Large)  Pulse 96  Temp 98.6  F (37  C) (Oral)  Ht 6' 1\" (1.854 m)  Wt 248 lb (112.5 kg)  SpO2 96%  BMI 32.72 kg/m2 Estimated body mass index is 32.72 kg/(m^2) as calculated from the following:    Height as of this encounter: 6' 1\" (1.854 m).    Weight as of this encounter: 248 lb (112.5 kg).  Medication Reconciliation: complete   Janee Birch MA    "

## 2018-01-02 NOTE — PROGRESS NOTES
SUBJECTIVE:   Andrew Coulter is a 37 year old male who presents to clinic today for the following health issues:      STD Screening    Patient presents for repeat STI screening today before moving to [facility] for work. He denies any known concerns from partners. No dysuria or urinary frequency. He has not had rashes.     He has otherwise been feeling well without abdominal pain, fevers, chest pain or shortness of breath. He did stop his testosterone by weaning down prior to going to [facility].       Problem list and histories reviewed & adjusted, as indicated.  Additional history: as documented    Patient Active Problem List   Diagnosis     Overweight     ABRAM (obstructive sleep apnea)     PReP therapy     Anxiety     Low testosterone     Mixed hyperlipidemia     Testosterone deficiency     Cervicalgia     Poor posture     Acquired postural kyphosis     Muscle spasm     Thoracic segment dysfunction     Impingement syndrome, shoulder, right     Past Surgical History:   Procedure Laterality Date     HERNIA REPAIR       SEPTOPLASTY         Social History   Substance Use Topics     Smoking status: Former Smoker     Packs/day: 0.50     Years: 12.00     Types: Cigarettes     Quit date: 4/11/2016     Smokeless tobacco: Never Used      Comment: using Chantix     Alcohol use 0.0 oz/week     0 Standard drinks or equivalent per week      Comment: 3-4 times per month, 1-2 drinks     Family History   Problem Relation Age of Onset     Hyperlipidemia Mother      Depression Mother      Sleep Apnea Mother      Hyperlipidemia Father      Anxiety Disorder Father      Substance Abuse Father      Hypertension Maternal Grandmother      Anxiety Disorder Maternal Grandmother      Hypertension Maternal Grandfather      DIABETES Maternal Grandfather      Substance Abuse Paternal Grandfather      CEREBROVASCULAR DISEASE Paternal Grandfather              Reviewed and updated as needed this visit by clinical staff       Reviewed and updated  "as needed this visit by Provider         ROS:  Constitutional, HEENT, cardiovascular, pulmonary, GI, , musculoskeletal, neuro, skin, endocrine and psych systems are negative, except as otherwise noted.      OBJECTIVE:   /78 (BP Location: Right arm, Cuff Size: Adult Large)  Pulse 96  Temp 98.6  F (37  C) (Oral)  Ht 6' 1\" (1.854 m)  Wt 248 lb (112.5 kg)  SpO2 96%  BMI 32.72 kg/m2  Body mass index is 32.72 kg/(m^2).  GENERAL: healthy, alert and no distress  RESP: lungs clear to auscultation - no rales, rhonchi or wheezes  CV: regular rate and rhythm, normal S1 S2, no S3 or S4, no murmur, click or rub, no peripheral edema and peripheral pulses strong  ABDOMEN: soft, nontender, no hepatosplenomegaly, no masses and bowel sounds normal  MS: no gross musculoskeletal defects noted, no edema  SKIN: no suspicious lesions or rashes  NEURO: Normal strength and tone, mentation intact and speech normal  PSYCH: mentation appears normal, affect normal/bright    Diagnostic Test Results:  Results for orders placed or performed during the hospital encounter of 11/09/17   Anti Treponema   Result Value Ref Range    Treponema pallidum Antibody Negative NEG^Negative       ASSESSMENT/PLAN:       ICD-10-CM    1. Routine screening for STI (sexually transmitted infection) Z11.3 HIV Antigen Antibody Combo     Anti Treponema     NEISSERIA GONORRHOEA PCR     CHLAMYDIA TRACHOMATIS PCR     NEISSERIA GONORRHOEA PCR     CHLAMYDIA TRACHOMATIS PCR     NEISSERIA GONORRHOEA PCR     CHLAMYDIA TRACHOMATIS PCR     See pt instructions    Jasper Osullivan MD, MD  [facility]  "

## 2018-01-02 NOTE — MR AVS SNAPSHOT
"              After Visit Summary   1/2/2018    Andrew Coulter    MRN: [id number]           Patient Information     Date Of Birth          1980        Visit Information        Provider Department      1/2/2018 3:10 PM Jasper Osullivan MD [facility]        Today's Diagnoses     Routine screening for STI (sexually transmitted infection)    -  1      Care Instructions    1) labs downstairs today    2) I will send you Xitronixhart when labs back.    Jasper Osullivan MD          Follow-ups after your visit        Who to contact     If you have questions or need follow up information about today's clinic visit or your schedule please contact [facility] directly at [phone].  Normal or non-critical lab and imaging results will be communicated to you by MyChart, letter or phone within 4 business days after the clinic has received the results. If you do not hear from us within 7 days, please contact the clinic through [employer] or phone. If you have a critical or abnormal lab result, we will notify you by phone as soon as possible.  Submit refill requests through [employer] or call your pharmacy and they will forward the refill request to us. Please allow 3 business days for your refill to be completed.          Additional Information About Your Visit        MyChart Information     O[employer] gives you secure access to your electronic health record. If you see a primary care provider, you can also send messages to your care team and make appointments. If you have questions, please call your primary care clinic.  If you do not have a primary care provider, please call [phone] and they will assist you.        Care EveryWhere ID     This is your Care EveryWhere ID. This could be used by other organizations to access your [facility] medical records  [id number]        Your Vitals Were     Pulse Temperature Height Pulse Oximetry BMI (Body Mass Index)       96 98.6  F (37  C) (Oral) 6' 1\" (1.854 m) 96% 32.72 " "kg/m2        Blood Pressure from Last 3 Encounters:   01/02/18 134/78   10/16/17 136/90   09/14/17 132/82    Weight from Last 3 Encounters:   01/02/18 248 lb (112.5 kg)   10/16/17 257 lb (116.6 kg)   09/14/17 257 lb (116.6 kg)              We Performed the Following     Anti Treponema     CHLAMYDIA TRACHOMATIS PCR     CHLAMYDIA TRACHOMATIS PCR     CHLAMYDIA TRACHOMATIS PCR     HIV Antigen Antibody Combo     NEISSERIA GONORRHOEA PCR     NEISSERIA GONORRHOEA PCR     NEISSERIA GONORRHOEA PCR          Today's Medication Changes          These changes are accurate as of: 1/2/18 11:59 PM.  If you have any questions, ask your nurse or doctor.               These medicines have changed or have updated prescriptions.        Dose/Directions    atorvastatin 20 MG tablet   Commonly known as:  LIPITOR   This may have changed:  Another medication with the same name was removed. Continue taking this medication, and follow the directions you see here.   Used for:  Hyperlipidemia LDL goal <130   Changed by:  Jasper Osullivan MD        Dose:  20 mg   Take 1 tablet (20 mg) by mouth daily   Quantity:  90 tablet   Refills:  2         Stop taking these medicines if you haven't already. Please contact your care team if you have questions.     Needle (Disp) 22G X 1-1/2\" Misc   Stopped by:  Jasper Osullivan MD                    Primary Care Provider Office Phone # Fax #    Jasper Osullivan [phone] [address] [place] [address]        Equal Access to Services     [facility] AH: Hadii aad ku hadasho Soomaali, waaxda luqadaha, qaybta kaalmada adeegyada, waxay kinain hayaan baltazar mcgrath'juani . So [facility] [phone].    ATENCIÓN: Si habla español, tiene a singh disposición servicios gratuitos de asistencia lingüística. Llame al [phone].    We comply with applicable federal civil rights laws and [place] laws. We do not discriminate on the basis of race, color, national origin, age, " disability, sex, sexual orientation, or gender identity.            Thank you!     Thank you for choosing [facility]  for your care. Our goal is always to provide you with excellent care. Hearing back from our patients is one way we can continue to improve our services. Please take a few minutes to complete the written survey that you may receive in the mail after your visit with us. Thank you!             Your Updated Medication List - Protect others around you: Learn how to safely use, store and throw away your medicines at www.disposemymeds.org.          This list is accurate as of: 1/2/18 11:59 PM.  Always use your most recent med list.                   Brand Name Dispense Instructions for use Diagnosis    aspirin 81 MG tablet      Take by mouth daily        atorvastatin 20 MG tablet    LIPITOR    90 tablet    Take 1 tablet (20 mg) by mouth daily    Hyperlipidemia LDL goal <130       emtricitabine-tenofovir 200-300 MG per tablet    TRUVADA    90 tablet    Take 1 tablet by mouth daily    Exposure to human immunodeficiency virus       EPINEPHrine 0.3 MG/0.3ML injection 2-pack    EPIPEN/ADRENACLICK/or ANY BX GENERIC EQUIV    0.6 mL    Inject 0.3 mLs (0.3 mg) into the muscle once as needed for anaphylaxis    Bee allergy status       OMEGA-3 FISH OIL PO           testosterone cypionate 200 MG/ML injection    DEPOTESTOTERONE    10 mL    Inject 0.5 mLs (100 mg) into the muscle once a week    Testosterone deficiency       VITAMIN D PO           zolpidem 10 MG tablet    AMBIEN    90 tablet    Take 1 tablet (10 mg) by mouth nightly as needed for sleep    Primary insomnia

## 2018-01-03 LAB — HIV 1+2 AB+HIV1 P24 AG SERPL QL IA: NONREACTIVE

## 2018-01-04 LAB
C TRACH DNA SPEC QL NAA+PROBE: NEGATIVE
N GONORRHOEA DNA SPEC QL NAA+PROBE: NEGATIVE
SPECIMEN SOURCE: NORMAL
T PALLIDUM IGG+IGM SER QL: NEGATIVE

## 2018-04-01 DIAGNOSIS — E78.2 MIXED HYPERLIPIDEMIA: Primary | ICD-10-CM

## 2018-04-01 NOTE — TELEPHONE ENCOUNTER
" Requested Prescriptions   Pending Prescriptions Disp Refills     atorvastatin (LIPITOR) 40 MG tablet [Pharmacy Med Name: ATORVASTATIN 40 MG TABLET]  Last Written Prescription Date:  11/22/2017  Last Fill Quantity: 90 tablet,  # refills: 2   Last office visit: 1/2/2018 with prescribing provider:  Jasper Osullivan MD    Future Office Visit:     90 tablet 1     Sig: TAKE 1 TABLET BY MOUTH EVERY DAY    Statins Protocol Passed    4/1/2018  1:42 AM       Passed - LDL on file in past 12 months    Recent Labs   Lab Test  09/08/17   0800   LDL  66            Passed - No abnormal creatine kinase in past 12 months    No lab results found.            Passed - Recent (12 mo) or future (30 days) visit within the authorizing provider's specialty    Patient had office visit in the last 12 months or has a visit in the next 30 days with authorizing provider or within the authorizing provider's specialty.  See \"Patient Info\" tab in inbasket, or \"Choose Columns\" in Meds & Orders section of the refill encounter.           Passed - Patient is age 18 or older          "

## 2018-04-03 RX ORDER — ATORVASTATIN CALCIUM 40 MG/1
TABLET, FILM COATED ORAL
Qty: 90 TABLET | Refills: 0 | Status: SHIPPED | OUTPATIENT
Start: 2018-04-03 | End: 2019-06-30

## 2018-04-03 NOTE — TELEPHONE ENCOUNTER
Prescription approved per [facility] Refill Protocol.    Ros RENTERIA RN, BSN, PHN  [place] [facility] RN

## 2019-03-27 ENCOUNTER — OFFICE VISIT (OUTPATIENT)
Dept: PEDIATRICS | Facility: CLINIC | Age: 39
End: 2019-03-27
Payer: COMMERCIAL

## 2019-03-27 VITALS
TEMPERATURE: 98.6 F | HEIGHT: 73 IN | OXYGEN SATURATION: 97 % | DIASTOLIC BLOOD PRESSURE: 82 MMHG | WEIGHT: 248.8 LBS | BODY MASS INDEX: 32.97 KG/M2 | SYSTOLIC BLOOD PRESSURE: 126 MMHG | HEART RATE: 65 BPM

## 2019-03-27 DIAGNOSIS — Z11.3 ROUTINE SCREENING FOR STI (SEXUALLY TRANSMITTED INFECTION): ICD-10-CM

## 2019-03-27 DIAGNOSIS — A07.1 GIARDIA: ICD-10-CM

## 2019-03-27 DIAGNOSIS — R19.7 DIARRHEA, UNSPECIFIED TYPE: Primary | ICD-10-CM

## 2019-03-27 DIAGNOSIS — R19.7 DIARRHEA, UNSPECIFIED TYPE: ICD-10-CM

## 2019-03-27 DIAGNOSIS — Z20.6 EXPOSURE TO HUMAN IMMUNODEFICIENCY VIRUS: ICD-10-CM

## 2019-03-27 DIAGNOSIS — E78.2 MIXED HYPERLIPIDEMIA: ICD-10-CM

## 2019-03-27 LAB
ALBUMIN SERPL-MCNC: 4.4 G/DL (ref 3.4–5)
ALP SERPL-CCNC: 81 U/L (ref 40–150)
ALT SERPL W P-5'-P-CCNC: 49 U/L (ref 0–70)
ANION GAP SERPL CALCULATED.3IONS-SCNC: 7 MMOL/L (ref 3–14)
AST SERPL W P-5'-P-CCNC: 24 U/L (ref 0–45)
BILIRUB SERPL-MCNC: 1 MG/DL (ref 0.2–1.3)
BUN SERPL-MCNC: 16 MG/DL (ref 7–30)
C COLI+JEJUNI+LARI FUSA STL QL NAA+PROBE: NOT DETECTED
CALCIUM SERPL-MCNC: 9 MG/DL (ref 8.5–10.1)
CHLORIDE SERPL-SCNC: 104 MMOL/L (ref 94–109)
CHOLEST SERPL-MCNC: 163 MG/DL
CO2 SERPL-SCNC: 27 MMOL/L (ref 20–32)
CREAT SERPL-MCNC: 0.92 MG/DL (ref 0.66–1.25)
EC STX1 GENE STL QL NAA+PROBE: NOT DETECTED
EC STX2 GENE STL QL NAA+PROBE: NOT DETECTED
ENTERIC PATHOGEN COMMENT: NORMAL
GFR SERPL CREATININE-BSD FRML MDRD: >90 ML/MIN/{1.73_M2}
GLUCOSE SERPL-MCNC: 95 MG/DL (ref 70–99)
HDLC SERPL-MCNC: 28 MG/DL
HIV 1+2 AB+HIV1 P24 AG SERPL QL IA: NONREACTIVE
LDLC SERPL CALC-MCNC: 91 MG/DL
NONHDLC SERPL-MCNC: 135 MG/DL
NOROV GI+II ORF1-ORF2 JNC STL QL NAA+PR: NOT DETECTED
POTASSIUM SERPL-SCNC: 3.9 MMOL/L (ref 3.4–5.3)
PROT SERPL-MCNC: 7.8 G/DL (ref 6.8–8.8)
RVA NSP5 STL QL NAA+PROBE: NOT DETECTED
SALMONELLA SP RPOD STL QL NAA+PROBE: NOT DETECTED
SHIGELLA SP+EIEC IPAH STL QL NAA+PROBE: NOT DETECTED
SODIUM SERPL-SCNC: 138 MMOL/L (ref 133–144)
TRIGL SERPL-MCNC: 218 MG/DL
V CHOL+PARA RFBL+TRKH+TNAA STL QL NAA+PR: NOT DETECTED
Y ENTERO RECN STL QL NAA+PROBE: NOT DETECTED

## 2019-03-27 PROCEDURE — 87177 OVA AND PARASITES SMEARS: CPT | Performed by: INTERNAL MEDICINE

## 2019-03-27 PROCEDURE — 80061 LIPID PANEL: CPT | Performed by: INTERNAL MEDICINE

## 2019-03-27 PROCEDURE — 99214 OFFICE O/P EST MOD 30 MIN: CPT | Performed by: INTERNAL MEDICINE

## 2019-03-27 PROCEDURE — 87209 SMEAR COMPLEX STAIN: CPT | Performed by: INTERNAL MEDICINE

## 2019-03-27 PROCEDURE — 80053 COMPREHEN METABOLIC PANEL: CPT | Performed by: INTERNAL MEDICINE

## 2019-03-27 PROCEDURE — 36415 COLL VENOUS BLD VENIPUNCTURE: CPT | Performed by: INTERNAL MEDICINE

## 2019-03-27 PROCEDURE — 87389 HIV-1 AG W/HIV-1&-2 AB AG IA: CPT | Performed by: INTERNAL MEDICINE

## 2019-03-27 PROCEDURE — 87506 IADNA-DNA/RNA PROBE TQ 6-11: CPT | Performed by: INTERNAL MEDICINE

## 2019-03-27 PROCEDURE — 87329 GIARDIA AG IA: CPT | Performed by: INTERNAL MEDICINE

## 2019-03-27 PROCEDURE — 87206 SMEAR FLUORESCENT/ACID STAI: CPT | Performed by: INTERNAL MEDICINE

## 2019-03-27 PROCEDURE — 87491 CHLMYD TRACH DNA AMP PROBE: CPT | Mod: 59 | Performed by: INTERNAL MEDICINE

## 2019-03-27 PROCEDURE — 87591 N.GONORRHOEAE DNA AMP PROB: CPT | Mod: 59 | Performed by: INTERNAL MEDICINE

## 2019-03-27 PROCEDURE — 0064U ANTB TP TOTAL&RPR IA QUAL: CPT | Performed by: INTERNAL MEDICINE

## 2019-03-27 ASSESSMENT — MIFFLIN-ST. JEOR: SCORE: 2102.43

## 2019-03-27 NOTE — PROGRESS NOTES
SUBJECTIVE:   Andrew Coulter is a 38 year old male who presents to clinic today for the following health issues:      Pt would like to follow up on STD and discuss further, also like HIV screening as well. Pt does not want to do physical today.     Diarrhea: present after traveling in [place]. Was drinking spring water, eating unpasturized milk and cheese. No hematochezia or melena. No fevers. Having multiple stools per day. No nausea or abdominal pain.    PrEP: would like to continue on this, notes that he would like sti testing today as had several new partners. No dysuria or urinary frequency.     Hyperlipidemia: on atorvastatin, no myalgias, Due for recheck lipids    Problem list and histories reviewed & adjusted, as indicated.  Additional history: as documented    Patient Active Problem List   Diagnosis     Overweight     ABRAM (obstructive sleep apnea)     PReP therapy     Anxiety     Low testosterone     Mixed hyperlipidemia     Testosterone deficiency     Cervicalgia     Poor posture     Acquired postural kyphosis     Muscle spasm     Thoracic segment dysfunction     Impingement syndrome, shoulder, right     Past Surgical History:   Procedure Laterality Date     HERNIA REPAIR       SEPTOPLASTY         Social History     Tobacco Use     Smoking status: Former Smoker     Packs/day: 0.50     Years: 12.00     Pack years: 6.00     Types: Cigarettes     Last attempt to quit: 2016     Years since quittin.9     Smokeless tobacco: Never Used     Tobacco comment: using Chantix   Substance Use Topics     Alcohol use: Yes     Alcohol/week: 0.0 oz     Comment: 3-4 times per month, 1-2 drinks     Family History   Problem Relation Age of Onset     Hyperlipidemia Mother      Depression Mother      Sleep Apnea Mother      Hyperlipidemia Father      Anxiety Disorder Father      Substance Abuse Father      Hypertension Maternal Grandmother      Anxiety Disorder Maternal Grandmother      Hypertension Maternal  "Grandfather      Diabetes Maternal Grandfather      Substance Abuse Paternal Grandfather      Cerebrovascular Disease Paternal Grandfather            Reviewed and updated as needed this visit by clinical staff       Reviewed and updated as needed this visit by Provider         ROS:  Constitutional, HEENT, cardiovascular, pulmonary, GI, , musculoskeletal, neuro, skin, endocrine and psych systems are negative, except as otherwise noted.    OBJECTIVE:     /82 (BP Location: Right arm, Patient Position: Sitting, Cuff Size: Adult Regular)   Pulse 65   Temp 98.6  F (37  C) (Oral)   Ht 1.854 m (6' 1\")   Wt 112.9 kg (248 lb 12.8 oz)   SpO2 97%   BMI 32.83 kg/m    Body mass index is 32.83 kg/m .  GENERAL: healthy, alert and no distress  EYES: Eyes grossly normal to inspection, PERRL and conjunctivae and sclerae normal  NECK: no adenopathy, no asymmetry, masses, or scars and thyroid normal to palpation  RESP: lungs clear to auscultation - no rales, rhonchi or wheezes  CV: regular rate and rhythm, normal S1 S2, no S3 or S4, no murmur, click or rub, no peripheral edema and peripheral pulses strong  ABDOMEN: soft, nontender, no hepatosplenomegaly, no masses and bowel sounds normal  MS: no gross musculoskeletal defects noted, no edema  SKIN: no suspicious lesions or rashes  NEURO: Normal strength and tone, mentation intact and speech normal  PSYCH: mentation appears normal, affect normal/bright    Diagnostic Test Results:  Results for orders placed or performed in visit on 03/27/19   Lipid panel reflex to direct LDL Fasting   Result Value Ref Range    Cholesterol 163 <200 mg/dL    Triglycerides 218 (H) <150 mg/dL    HDL Cholesterol 28 (L) >39 mg/dL    LDL Cholesterol Calculated 91 <100 mg/dL    Non HDL Cholesterol 135 (H) <130 mg/dL   HIV Antigen Antibody Combo   Result Value Ref Range    HIV Antigen Antibody Combo Nonreactive NR^Nonreactive       Treponema Abs w Reflex to RPR and Titer   Result Value Ref Range    " Treponema Antibodies Nonreactive NR^Nonreactive   Comprehensive metabolic panel   Result Value Ref Range    Sodium 138 133 - 144 mmol/L    Potassium 3.9 3.4 - 5.3 mmol/L    Chloride 104 94 - 109 mmol/L    Carbon Dioxide 27 20 - 32 mmol/L    Anion Gap 7 3 - 14 mmol/L    Glucose 95 70 - 99 mg/dL    Urea Nitrogen 16 7 - 30 mg/dL    Creatinine 0.92 0.66 - 1.25 mg/dL    GFR Estimate >90 >60 mL/min/[1.73_m2]    GFR Estimate If Black >90 >60 mL/min/[1.73_m2]    Calcium 9.0 8.5 - 10.1 mg/dL    Bilirubin Total 1.0 0.2 - 1.3 mg/dL    Albumin 4.4 3.4 - 5.0 g/dL    Protein Total 7.8 6.8 - 8.8 g/dL    Alkaline Phosphatase 81 40 - 150 U/L    ALT 49 0 - 70 U/L    AST 24 0 - 45 U/L   NEISSERIA GONORRHOEA PCR   Result Value Ref Range    Specimen Descrip Urine     N Gonorrhea PCR Negative NEG^Negative   CHLAMYDIA TRACHOMATIS PCR   Result Value Ref Range    Specimen Description Urine     Chlamydia Trachomatis PCR Negative NEG^Negative   NEISSERIA GONORRHOEA PCR   Result Value Ref Range    Specimen Descrip Rectal     N Gonorrhea PCR Negative NEG^Negative   CHLAMYDIA TRACHOMATIS PCR   Result Value Ref Range    Specimen Description Rectal     Chlamydia Trachomatis PCR Negative NEG^Negative   NEISSERIA GONORRHOEA PCR   Result Value Ref Range    Specimen Descrip Throat     N Gonorrhea PCR Negative NEG^Negative   CHLAMYDIA TRACHOMATIS PCR   Result Value Ref Range    Specimen Description Throat     Chlamydia Trachomatis PCR Negative NEG^Negative       ASSESSMENT/PLAN:       ICD-10-CM    1. Diarrhea, unspecified type R19.7 Ova and Parasite Exam Routine     Enteric Bacteria and Virus Panel by ELIZABETH Stool     Giardia antigen     Cryptosporidium in stool, stain     CANCELED: Cryptosporidium Antigen by EIA Stool   2. PReP therapy Z20.6 HIV Antigen Antibody Combo     Comprehensive metabolic panel   3. Mixed hyperlipidemia E78.2 Lipid panel reflex to direct LDL Fasting   4. Routine screening for STI (sexually transmitted infection) Z11.3 NEISSERIA  GONORRHOEA PCR     CHLAMYDIA TRACHOMATIS PCR     NEISSERIA GONORRHOEA PCR     CHLAMYDIA TRACHOMATIS PCR     NEISSERIA GONORRHOEA PCR     CHLAMYDIA TRACHOMATIS PCR     Treponema Abs w Reflex to RPR and Titer     Comprehensive metabolic panel   5. Giardia A07.1 albendazole (ALBENZA) 200 MG tablet     Patient Instructions   1)  the stool kits for at home testing    2) Labs downstairs with urine and blood- Stop at lab downstairs today. If you have Mychart, your results will be sent this way. If you do not have Mychart, you can sign up by following the link in this after visit summary.    3) Let me know when you need refills on things.    MD Jasper Gandara MD  [facility]

## 2019-03-27 NOTE — PATIENT INSTRUCTIONS
1)  the stool kits for at home testing    2) Labs downstairs with urine and blood- Stop at lab downstairs today. If you have Mychart, your results will be sent this way. If you do not have Mychart, you can sign up by following the link in this after visit summary.    3) Let me know when you need refills on things.    Jasper Osullivan MD

## 2019-03-28 LAB
C TRACH DNA SPEC QL NAA+PROBE: NEGATIVE
G LAMBLIA AG STL QL IA: ABNORMAL
N GONORRHOEA DNA SPEC QL NAA+PROBE: NEGATIVE
O+P STL MICRO: ABNORMAL
SPECIMEN SOURCE: ABNORMAL
SPECIMEN SOURCE: ABNORMAL
SPECIMEN SOURCE: NORMAL
T PALLIDUM AB SER QL: NONREACTIVE

## 2019-03-28 RX ORDER — ALBENDAZOLE 200 MG/1
400 TABLET, FILM COATED ORAL DAILY
Qty: 10 TABLET | Refills: 0 | Status: SHIPPED | OUTPATIENT
Start: 2019-03-28 | End: 2019-04-02

## 2019-03-29 LAB
O+P STL CONC: NORMAL
O+P STL CONC: NORMAL
SPECIMEN SOURCE: NORMAL

## 2019-04-08 DIAGNOSIS — G47.33 OSA (OBSTRUCTIVE SLEEP APNEA): Primary | ICD-10-CM

## 2019-07-30 DIAGNOSIS — Z20.6 EXPOSURE TO HUMAN IMMUNODEFICIENCY VIRUS: ICD-10-CM

## 2019-07-30 LAB
ALBUMIN SERPL-MCNC: 4.2 G/DL (ref 3.4–5)
ALP SERPL-CCNC: 74 U/L (ref 40–150)
ALT SERPL W P-5'-P-CCNC: 51 U/L (ref 0–70)
ANION GAP SERPL CALCULATED.3IONS-SCNC: 4 MMOL/L (ref 3–14)
AST SERPL W P-5'-P-CCNC: 21 U/L (ref 0–45)
BILIRUB SERPL-MCNC: 0.7 MG/DL (ref 0.2–1.3)
BUN SERPL-MCNC: 16 MG/DL (ref 7–30)
CALCIUM SERPL-MCNC: 9.1 MG/DL (ref 8.5–10.1)
CHLORIDE SERPL-SCNC: 108 MMOL/L (ref 94–109)
CO2 SERPL-SCNC: 29 MMOL/L (ref 20–32)
CREAT SERPL-MCNC: 0.95 MG/DL (ref 0.66–1.25)
GFR SERPL CREATININE-BSD FRML MDRD: >90 ML/MIN/{1.73_M2}
GLUCOSE SERPL-MCNC: 95 MG/DL (ref 70–99)
POTASSIUM SERPL-SCNC: 4.2 MMOL/L (ref 3.4–5.3)
PROT SERPL-MCNC: 7.7 G/DL (ref 6.8–8.8)
SODIUM SERPL-SCNC: 141 MMOL/L (ref 133–144)

## 2019-07-30 PROCEDURE — 87389 HIV-1 AG W/HIV-1&-2 AB AG IA: CPT | Performed by: INTERNAL MEDICINE

## 2019-07-30 PROCEDURE — 36415 COLL VENOUS BLD VENIPUNCTURE: CPT | Performed by: INTERNAL MEDICINE

## 2019-07-30 PROCEDURE — 80053 COMPREHEN METABOLIC PANEL: CPT | Performed by: INTERNAL MEDICINE

## 2019-07-31 ENCOUNTER — OFFICE VISIT (OUTPATIENT)
Dept: URGENT CARE | Facility: URGENT CARE | Age: 39
End: 2019-07-31
Payer: COMMERCIAL

## 2019-07-31 VITALS
SYSTOLIC BLOOD PRESSURE: 134 MMHG | DIASTOLIC BLOOD PRESSURE: 84 MMHG | OXYGEN SATURATION: 96 % | WEIGHT: 238 LBS | RESPIRATION RATE: 20 BRPM | BODY MASS INDEX: 31.4 KG/M2 | HEART RATE: 84 BPM | TEMPERATURE: 97.8 F

## 2019-07-31 DIAGNOSIS — H66.91 ACUTE OTITIS MEDIA, RIGHT: Primary | ICD-10-CM

## 2019-07-31 LAB — HIV 1+2 AB+HIV1 P24 AG SERPL QL IA: NONREACTIVE

## 2019-07-31 PROCEDURE — 99213 OFFICE O/P EST LOW 20 MIN: CPT | Performed by: PHYSICIAN ASSISTANT

## 2019-07-31 RX ORDER — AZITHROMYCIN 250 MG/1
TABLET, FILM COATED ORAL
Qty: 6 TABLET | Refills: 0 | Status: SHIPPED | OUTPATIENT
Start: 2019-07-31 | End: 2021-07-08

## 2019-08-05 ENCOUNTER — MYC REFILL (OUTPATIENT)
Dept: PEDIATRICS | Facility: CLINIC | Age: 39
End: 2019-08-05

## 2019-08-05 DIAGNOSIS — F51.01 PRIMARY INSOMNIA: ICD-10-CM

## 2019-08-05 DIAGNOSIS — E78.2 MIXED HYPERLIPIDEMIA: ICD-10-CM

## 2019-08-05 DIAGNOSIS — Z20.6 EXPOSURE TO HUMAN IMMUNODEFICIENCY VIRUS: ICD-10-CM

## 2019-08-05 RX ORDER — ATORVASTATIN CALCIUM 40 MG/1
40 TABLET, FILM COATED ORAL DAILY
Qty: 90 TABLET | Refills: 0 | Status: SHIPPED | OUTPATIENT
Start: 2019-08-05 | End: 2019-08-08

## 2019-08-05 RX ORDER — ZOLPIDEM TARTRATE 10 MG/1
10 TABLET ORAL
Qty: 90 TABLET | Refills: 1 | Status: CANCELLED | OUTPATIENT
Start: 2019-08-05

## 2019-08-05 RX ORDER — EMTRICITABINE AND TENOFOVIR DISOPROXIL FUMARATE 200; 300 MG/1; MG/1
1 TABLET, FILM COATED ORAL DAILY
Qty: 90 TABLET | Refills: 0 | Status: SHIPPED | OUTPATIENT
Start: 2019-08-05 | End: 2019-08-08

## 2019-08-05 NOTE — TELEPHONE ENCOUNTER
Called patient as all 3 of these were sent to [facility] in [place] on 7/2/19.     Patient reports he never picked these up. [place]  correl[place] this.     Transferred all 3 rx's to [facility]. Could e-scribe Truvada & Atorvastation. Verbally called in the 7/2/19 Ambien rx to [facility] Pharmacist.

## 2019-08-10 NOTE — PROGRESS NOTES
SUBJECTIVE:    Andrew Coulter is a 38 year old male who presents with right ear pain for 3 day(s).   Severity: mild   Timing:sudden onset  Additional symptoms include none.      History of recurrent otitis: no    History reviewed. No pertinent past medical history.  Current Outpatient Medications   Medication Sig Dispense Refill     azithromycin (ZITHROMAX) 250 MG tablet Two tablets first day, then one tablet daily for four days. 6 tablet 0     EPINEPHrine (EPIPEN/ADRENACLICK/OR ANY BX GENERIC EQUIV) 0.3 MG/0.3ML injection 2-pack Inject 0.3 mLs (0.3 mg) into the muscle once as needed for anaphylaxis 0.6 mL 1     aspirin 81 MG tablet Take by mouth daily       atorvastatin (LIPITOR) 40 MG tablet Take 1 tablet (40 mg) by mouth daily 90 tablet 3     Cholecalciferol (VITAMIN D PO)        emtricitabine-tenofovir (TRUVADA) 200-300 MG per tablet Take 1 tablet by mouth daily 90 tablet 0     Omega-3 Fatty Acids (OMEGA-3 FISH OIL PO)        zolpidem (AMBIEN) 10 MG tablet Take 1 tablet (10 mg) by mouth nightly as needed for sleep 90 tablet 1     Social History     Tobacco Use     Smoking status: Former Smoker     Packs/day: 0.50     Years: 12.00     Pack years: 6.00     Types: Cigarettes     Last attempt to quit: 4/11/2016     Years since quitting: 3.3     Smokeless tobacco: Never Used     Tobacco comment: using Chantix   Substance Use Topics     Alcohol use: Yes     Alcohol/week: 0.0 oz     Comment: 3-4 times per month, 1-2 drinks       ROS:   Review of systems negative except as stated above.    OBJECTIVE:  /84   Pulse 84   Temp 97.8  F (36.6  C)   Resp 20   Wt 108 kg (238 lb)   SpO2 96%   BMI 31.40 kg/m     EXAM:  The right TM is bulging and erythematous     The right auditory canal is normal and without drainage, edema or erythema  The left TM is normal: no effusions, no erythema, and normal landmarks  The left auditory canal is normal and without drainage, edema or erythema  Oropharynx exam is normal: no  lesions, erythema, adenopathy or exudate.  GENERAL: no acute distress  EYES: EOMI,  PERRL, conjunctiva clear  NECK: supple, non-tender to palpation, no adenopathy noted  RESP: lungs clear to auscultation - no rales, rhonchi or wheezes  CV: regular rates and rhythm, normal S1 S2, no murmur noted  SKIN: no suspicious lesions or rashes     ASSESSMENT:  (H66.91) Acute otitis media, right  (primary encounter diagnosis)  Plan: azithromycin (ZITHROMAX) 250 MG tablet  Follow up with PCP if symptoms worsen or fail to improve  In 2-3 days  '      Patient Instructions     Patient Education     Middle Ear Infection (Adult)  You have an infection of the middle ear, the space behind the eardrum. This is also called acute otitis media (AOM). Sometimes it is caused by the common cold. This is because congestion can block the internal passage (eustachian tube) that drains fluid from the middle ear. When the middle ear fills with fluid, bacteria can grow there and cause an infection. Oral antibiotics are used to treat this illness, not ear drops. Symptoms usually start to improve within 1 to 2 days of treatment.    Home care  The following are general care guidelines:    Finish all of the antibiotic medicine given, even though you may feel better after the first few days.    You may use over-the-counter medicine, such as acetaminophen or ibuprofen, to control pain and fever, unless something else was prescribed. If you have chronic liver or kidney disease or have ever had a stomach ulcer or gastrointestinal bleeding, talk with your healthcare provider before using these medicines. Do not give aspirin to anyone under 18 years of age who has a fever. It may cause severe illness or death.  Follow-up care  Follow up with your healthcare provider, or as advised, in 2 weeks if all symptoms have not gotten better, or if hearing doesn't go back to normal within 1 month.  When to seek medical advice  Call your healthcare provider right away  if any of these occur:    Ear pain gets worse or does not improve after 3 days of treatment    Unusual drowsiness or confusion    Neck pain, stiff neck, or headache    Fluid or blood draining from the ear canal    Fever of 100.4 F (38 C) or as advised     Seizure  Date Last Reviewed: 6/1/2016 2000-2018 [employer]. [address] [place] [address]. All rights reserved. This information is not intended as a substitute for professional medical care. Always follow your healthcare professional's instructions.         1.  Plenty of fluids, rest, warm compresses on face  2.  Mucinex twice daily for at least 4 days  3.  Rajani Pot 1x in the morning 1x at night (SALINE MIST SPRAY IS AN ACCEPTABLE, THOUGH NOT AS EFFECTIVE REPLACEMENT)  4.  Benadryl (diphenhydramine) at bedtime   5.  Either Claritin (Loratadine), Allegra (Fexofenadine), or Zyrtec (Cetirizine) in the day  6.  Flonase (Fluticasone) 2x each nostril twice a day for two weeks, then once each nostril once a day

## 2019-10-25 DIAGNOSIS — Z20.6 EXPOSURE TO HUMAN IMMUNODEFICIENCY VIRUS: ICD-10-CM

## 2019-10-26 NOTE — TELEPHONE ENCOUNTER
"Requested Prescriptions   Pending Prescriptions Disp Refills     emtricitabine-tenofovir (TRUVADA) 200-300 MG per tablet  Last Written Prescription Date:  08/08/2019  Last Fill Quantity: 90 tablet,  # refills: 0   Last Office Visit: 3/27/2019 Jasper Osullivan MD   Future Office Visit:      90 tablet 0     Sig: Take 1 tablet by mouth daily       Antiretroviral Agents Failed - 10/25/2019  6:21 PM        Failed - Refills for this medication group require provider approval        Failed - Creatinine Clearance greater than 50 ml/min on file in past 3 mos.     No lab results found.          Failed - Serum HIV less than 200 on file in past 3 mos.     No lab results found.          Failed - CD4 count greater than 300 on file in past 3 mos.     No lab results found.          Passed - AST less than 55 on file in past 3 mos     Recent Labs   Lab Test 07/30/19  1103   AST 21             Passed - Recent (3 mo) or future (30 days) visit within the authorizing provider's specialty     Patient had office visit in the last 3 months or has a visit in the next 30 days with authorizing provider or within the authorizing provider's specialty.  See \"Patient Info\" tab in inbasket, or \"Choose Columns\" in Meds & Orders section of the refill encounter.                Passed - Medication is active on med list        Passed - Serum Creatinine less than or equal to 1.4 on file in past 3 mos     Recent Labs   Lab Test 07/30/19  1103   CR 0.95             Passed - ALT less than 90 on file in past 3 mos.     Recent Labs   Lab Test 07/30/19  1103   ALT 51             Passed - Patient is age 18 or older          "

## 2019-10-28 DIAGNOSIS — Z20.6 EXPOSURE TO HUMAN IMMUNODEFICIENCY VIRUS: ICD-10-CM

## 2019-10-28 RX ORDER — EMTRICITABINE AND TENOFOVIR DISOPROXIL FUMARATE 200; 300 MG/1; MG/1
1 TABLET, FILM COATED ORAL DAILY
Qty: 90 TABLET | Refills: 0 | Status: SHIPPED | OUTPATIENT
Start: 2019-10-28 | End: 2020-01-20

## 2019-10-28 NOTE — TELEPHONE ENCOUNTER
Done.  Needs appointment with a new primary care provider within 1 month since Dr. Osullivan has left.  Please schedule ASAP.

## 2019-10-28 NOTE — TELEPHONE ENCOUNTER
Andrew called back and he is out of the [place] until [place]. He will call when he returns to make appointments.

## 2019-10-28 NOTE — TELEPHONE ENCOUNTER
Dianna ALVARADO called to offer appointment with Dr. Agustin. Left voicemail to call back. Rx faxed to listed pharmacy on prescription. Please verify if this is correct pharmacy to use. Please help with scheduling appointment with Dr. Agustin within 3 months ASAP due to availability.    Faxed Rx filed in Dr. Sneed folder at station [facility].     Marcell Aleman CMA ([facility])

## 2019-10-28 NOTE — TELEPHONE ENCOUNTER
Duplicate.     emtricitabine-tenofovir (TRUVADA) 200-300 MG per tablet was filled on 10/28/2019, qty 90 with 0 refills.

## 2019-10-29 RX ORDER — DEXAMETHASONE 0.75 MG/1
TABLET ORAL
Qty: 90 TABLET | Refills: 4 | OUTPATIENT
Start: 2019-10-29

## 2020-01-17 DIAGNOSIS — Z20.6 EXPOSURE TO HUMAN IMMUNODEFICIENCY VIRUS: ICD-10-CM

## 2020-01-17 NOTE — TELEPHONE ENCOUNTER
"Requested Prescriptions   Pending Prescriptions Disp Refills     emtricitabine-tenofovir (TRUVADA) 200-300 MG per tablet 90 tablet 0     Sig: Take 1 tablet by mouth daily Needs appointment with a new primary care provider within 3 month since Dr. Osullivan has left.  Please schedule ASAP.       Antiretroviral Agents Failed - 1/17/2020  8:47 AM        Failed - AST less than 55 on file in past 3 mos     Recent Labs   Lab Test 07/30/19  1103   AST 21             Failed - Recent (3 mo) or future (30 days) visit within the authorizing provider's specialty     Patient had office visit in the last 3 months or has a visit in the next 30 days with authorizing provider or within the authorizing provider's specialty.  See \"Patient Info\" tab in inbasket, or \"Choose Columns\" in Meds & Orders section of the refill encounter.                Failed - Refills for this medication group require provider approval        Failed - Serum Creatinine less than or equal to 1.4 on file in past 3 mos     Recent Labs   Lab Test 07/30/19  1103   CR 0.95             Failed - Creatinine Clearance greater than 50 ml/min on file in past 3 mos.     No lab results found.          Failed - Serum HIV less than 200 on file in past 3 mos.     No lab results found.          Failed - CD4 count greater than 300 on file in past 3 mos.     No lab results found.          Failed - ALT less than 90 on file in past 3 mos.     Recent Labs   Lab Test 07/30/19  1103   ALT 51             Passed - Medication is active on med list        Passed - Patient is age 18 or older          Last office visit 3-27-19    Routing refill request to provider for review/approval because:  Patient is over-due for an appointment.  (Please see last  refill encounter 10-25-19.)   No future appointment scheduled.    Please advise, thanks.      "

## 2020-01-20 RX ORDER — EMTRICITABINE AND TENOFOVIR DISOPROXIL FUMARATE 200; 300 MG/1; MG/1
1 TABLET, FILM COATED ORAL DAILY
Qty: 90 TABLET | Refills: 0 | Status: SHIPPED | OUTPATIENT
Start: 2020-01-20 | End: 2021-11-08

## 2020-01-20 NOTE — TELEPHONE ENCOUNTER
Please let patient know due for labs if back in [place] from [place], refilled x 1.     Due to set up with new PCP.    Jasper Osullivan MD

## 2020-03-10 ENCOUNTER — HEALTH MAINTENANCE LETTER (OUTPATIENT)
Age: 40
End: 2020-03-10

## 2020-04-01 DIAGNOSIS — Z20.6 EXPOSURE TO HUMAN IMMUNODEFICIENCY VIRUS: ICD-10-CM

## 2020-04-01 RX ORDER — DEXAMETHASONE 0.75 MG/1
TABLET ORAL
Qty: 90 TABLET | Refills: 3 | OUTPATIENT
Start: 2020-04-01

## 2020-04-01 NOTE — TELEPHONE ENCOUNTER
"Routing refill request to provider for review/approval because:  Christen given x1 and patient did not follow up, please advise    Requested Prescriptions   Pending Prescriptions Disp Refills     TRUVADA 200-300 MG per tablet [Pharmacy Med Name: TRUVADA TABS 30'S 200/300] 90 tablet 3     Sig: SEE ENCLOSED OR ELECTRONIC LITERATURE FOR DIRECTIONS ON HOW TO TAKE YOUR MEDICATION       Antiretroviral Agents Failed - 4/1/2020  4:01 AM        Failed - AST less than 55 on file in past 3 mos     Recent Labs   Lab Test 07/30/19  1103   AST 21             Failed - Recent (3 mo) or future (30 days) visit within the authorizing provider's specialty     Patient had office visit in the last 3 months or has a visit in the next 30 days with authorizing provider or within the authorizing provider's specialty.  See \"Patient Info\" tab in inbasket, or \"Choose Columns\" in Meds & Orders section of the refill encounter.                Failed - Refills for this medication group require provider approval        Failed - Serum Creatinine less than or equal to 1.4 on file in past 3 mos     Recent Labs   Lab Test 07/30/19  1103   CR 0.95       Ok to refill medication if creatinine is low          Failed - Creatinine Clearance greater than 50 ml/min on file in past 3 mos.     No lab results found.          Failed - Serum HIV less than 200 on file in past 3 mos.     No lab results found.          Failed - CD4 count greater than 300 on file in past 3 mos.     No lab results found.          Failed - ALT less than 90 on file in past 3 mos.     Recent Labs   Lab Test 07/30/19  1103   ALT 51             Passed - Medication is active on med list        Passed - Patient is age 18 or older           Shelley PETERSN, RN          "

## 2020-04-01 NOTE — TELEPHONE ENCOUNTER
LMTCB. Pt needs to establish with a new provider prior to refill. Previous Dr. Osullivan patient.     Marcell Aleman CMA ([facility])

## 2020-04-01 NOTE — TELEPHONE ENCOUNTER
Pt called and was given the information below. Med was actually auto ordered. Pt is in [place] right now and does not need the med. Please disregard the reorder.    Shelley Hernandez on 4/1/2020 at 4:04 PM

## 2020-04-01 NOTE — TELEPHONE ENCOUNTER
Patient has not been seen in over a year.    Previous Dr. Osullivan patient. Was previously notified that he would need new PCP.    Will need to establish with a new PCP - then probably set up a phone visit with that provider.    Heaven Allred MD  Internal Medicine/Pediatrics  [facility]

## 2020-12-27 ENCOUNTER — HEALTH MAINTENANCE LETTER (OUTPATIENT)
Age: 40
End: 2020-12-27

## 2021-03-03 ENCOUNTER — IMMUNIZATION (OUTPATIENT)
Dept: NURSING | Facility: CLINIC | Age: 41
End: 2021-03-03
Payer: OTHER GOVERNMENT

## 2021-03-03 PROCEDURE — 91301 PR COVID VAC MODERNA 100 MCG/0.5 ML IM: CPT

## 2021-03-03 PROCEDURE — 0011A PR COVID VAC MODERNA 100 MCG/0.5 ML IM: CPT

## 2021-03-31 ENCOUNTER — IMMUNIZATION (OUTPATIENT)
Dept: NURSING | Facility: CLINIC | Age: 41
End: 2021-03-31
Attending: INTERNAL MEDICINE
Payer: OTHER GOVERNMENT

## 2021-03-31 PROCEDURE — 0012A PR COVID VAC MODERNA 100 MCG/0.5 ML IM: CPT

## 2021-03-31 PROCEDURE — 91301 PR COVID VAC MODERNA 100 MCG/0.5 ML IM: CPT

## 2021-04-24 ENCOUNTER — HEALTH MAINTENANCE LETTER (OUTPATIENT)
Age: 41
End: 2021-04-24

## 2021-07-08 ENCOUNTER — OFFICE VISIT (OUTPATIENT)
Dept: FAMILY MEDICINE | Facility: CLINIC | Age: 41
End: 2021-07-08
Payer: COMMERCIAL

## 2021-07-08 VITALS
HEIGHT: 73 IN | BODY MASS INDEX: 36.1 KG/M2 | SYSTOLIC BLOOD PRESSURE: 137 MMHG | TEMPERATURE: 97.8 F | WEIGHT: 272.4 LBS | DIASTOLIC BLOOD PRESSURE: 88 MMHG | OXYGEN SATURATION: 97 % | HEART RATE: 73 BPM

## 2021-07-08 DIAGNOSIS — Z90.79 STATUS POST ORCHIECTOMY: ICD-10-CM

## 2021-07-08 DIAGNOSIS — R53.83 OTHER FATIGUE: ICD-10-CM

## 2021-07-08 DIAGNOSIS — N50.82 SCROTAL PAIN: ICD-10-CM

## 2021-07-08 DIAGNOSIS — F17.200 TOBACCO USE DISORDER: ICD-10-CM

## 2021-07-08 DIAGNOSIS — Z76.89 ENCOUNTER TO ESTABLISH CARE: Primary | ICD-10-CM

## 2021-07-08 DIAGNOSIS — Z20.2 CONTACT WITH AND (SUSPECTED) EXPOSURE TO INFECTIONS WITH A PREDOMINANTLY SEXUAL MODE OF TRANSMISSION: ICD-10-CM

## 2021-07-08 DIAGNOSIS — G47.26 SHIFT WORK SLEEP DISORDER: ICD-10-CM

## 2021-07-08 DIAGNOSIS — Z51.81 ENCOUNTER FOR THERAPEUTIC DRUG MONITORING: ICD-10-CM

## 2021-07-08 DIAGNOSIS — F51.01 PRIMARY INSOMNIA: ICD-10-CM

## 2021-07-08 DIAGNOSIS — Z00.00 ENCOUNTER FOR HEALTH MAINTENANCE EXAMINATION IN ADULT: ICD-10-CM

## 2021-07-08 DIAGNOSIS — Z91.030 BEE ALLERGY STATUS: ICD-10-CM

## 2021-07-08 DIAGNOSIS — G47.33 OSA (OBSTRUCTIVE SLEEP APNEA): ICD-10-CM

## 2021-07-08 PROBLEM — M75.41 IMPINGEMENT SYNDROME, SHOULDER, RIGHT: Status: RESOLVED | Noted: 2017-11-17 | Resolved: 2021-07-08

## 2021-07-08 LAB
ANION GAP SERPL CALCULATED.3IONS-SCNC: 4 MMOL/L (ref 3–14)
BUN SERPL-MCNC: 20 MG/DL (ref 7–30)
CALCIUM SERPL-MCNC: 8.9 MG/DL (ref 8.5–10.1)
CHLORIDE SERPL-SCNC: 106 MMOL/L (ref 94–109)
CO2 SERPL-SCNC: 28 MMOL/L (ref 20–32)
CREAT SERPL-MCNC: 0.95 MG/DL (ref 0.66–1.25)
GFR SERPL CREATININE-BSD FRML MDRD: >90 ML/MIN/{1.73_M2}
GLUCOSE SERPL-MCNC: 92 MG/DL (ref 70–99)
HIV 1+2 AB+HIV1 P24 AG SERPL QL IA: NONREACTIVE
POTASSIUM SERPL-SCNC: 4 MMOL/L (ref 3.4–5.3)
SODIUM SERPL-SCNC: 138 MMOL/L (ref 133–144)
T PALLIDUM AB SER QL: NONREACTIVE
TSH SERPL DL<=0.005 MIU/L-ACNC: 1.1 MU/L (ref 0.4–4)

## 2021-07-08 PROCEDURE — 99213 OFFICE O/P EST LOW 20 MIN: CPT | Mod: 25 | Performed by: STUDENT IN AN ORGANIZED HEALTH CARE EDUCATION/TRAINING PROGRAM

## 2021-07-08 PROCEDURE — 84443 ASSAY THYROID STIM HORMONE: CPT | Performed by: STUDENT IN AN ORGANIZED HEALTH CARE EDUCATION/TRAINING PROGRAM

## 2021-07-08 PROCEDURE — 80061 LIPID PANEL: CPT | Performed by: STUDENT IN AN ORGANIZED HEALTH CARE EDUCATION/TRAINING PROGRAM

## 2021-07-08 PROCEDURE — 87591 N.GONORRHOEAE DNA AMP PROB: CPT | Performed by: STUDENT IN AN ORGANIZED HEALTH CARE EDUCATION/TRAINING PROGRAM

## 2021-07-08 PROCEDURE — 87491 CHLMYD TRACH DNA AMP PROBE: CPT | Performed by: STUDENT IN AN ORGANIZED HEALTH CARE EDUCATION/TRAINING PROGRAM

## 2021-07-08 PROCEDURE — 86780 TREPONEMA PALLIDUM: CPT | Performed by: STUDENT IN AN ORGANIZED HEALTH CARE EDUCATION/TRAINING PROGRAM

## 2021-07-08 PROCEDURE — 80048 BASIC METABOLIC PNL TOTAL CA: CPT | Performed by: STUDENT IN AN ORGANIZED HEALTH CARE EDUCATION/TRAINING PROGRAM

## 2021-07-08 PROCEDURE — 84403 ASSAY OF TOTAL TESTOSTERONE: CPT | Performed by: STUDENT IN AN ORGANIZED HEALTH CARE EDUCATION/TRAINING PROGRAM

## 2021-07-08 PROCEDURE — 36415 COLL VENOUS BLD VENIPUNCTURE: CPT | Performed by: STUDENT IN AN ORGANIZED HEALTH CARE EDUCATION/TRAINING PROGRAM

## 2021-07-08 PROCEDURE — 99386 PREV VISIT NEW AGE 40-64: CPT | Mod: 25 | Performed by: STUDENT IN AN ORGANIZED HEALTH CARE EDUCATION/TRAINING PROGRAM

## 2021-07-08 PROCEDURE — 90471 IMMUNIZATION ADMIN: CPT | Mod: 59 | Performed by: STUDENT IN AN ORGANIZED HEALTH CARE EDUCATION/TRAINING PROGRAM

## 2021-07-08 PROCEDURE — 87389 HIV-1 AG W/HIV-1&-2 AB AG IA: CPT | Performed by: STUDENT IN AN ORGANIZED HEALTH CARE EDUCATION/TRAINING PROGRAM

## 2021-07-08 PROCEDURE — 90651 9VHPV VACCINE 2/3 DOSE IM: CPT | Performed by: STUDENT IN AN ORGANIZED HEALTH CARE EDUCATION/TRAINING PROGRAM

## 2021-07-08 RX ORDER — ZOLPIDEM TARTRATE 10 MG/1
10 TABLET ORAL
Qty: 30 TABLET | Refills: 0 | Status: SHIPPED | OUTPATIENT
Start: 2021-07-08 | End: 2022-02-10

## 2021-07-08 RX ORDER — EPINEPHRINE 0.3 MG/.3ML
0.3 INJECTION SUBCUTANEOUS
Qty: 0.6 ML | Refills: 1 | Status: SHIPPED | OUTPATIENT
Start: 2021-07-08 | End: 2023-03-24

## 2021-07-08 RX ORDER — VARENICLINE TARTRATE 1 MG/1
1 TABLET, FILM COATED ORAL 2 TIMES DAILY
Qty: 60 TABLET | Refills: 4 | Status: SHIPPED | OUTPATIENT
Start: 2021-07-08 | End: 2021-08-25

## 2021-07-08 ASSESSMENT — MIFFLIN-ST. JEOR: SCORE: 2196.86

## 2021-07-08 NOTE — PATIENT INSTRUCTIONS
Patient Education   Here is the plan from today's visit    It was so great to meet you today!    I'll send a mychart with lab results. We can message or talk over the phone if T level is low again.     Our standard PrEP monitoring is HIV testing [id number] and STI screen and BMP q3-6 months depending on exposure risk. I would be okay with clinic visit every 6 mos and lab-only at 3 month intervals depending on your preference.     I didn't remember to mention, but it may be worth checking your BP at home or at work sometime! (Maybe at a... not stressful time at work?) If you're persisting in the low 140s/90s we should probably talk more intentionally about it to keep your blood vessels safe long-term, especially w/ the cholesterol history.     1. Encounter to establish care    2. Encounter for health maintenance examination in adult  - Neisseria gonorrhoeae PCR  - Chlamydia trachomatis PCR  - NEISSERIA GONORRHOEA PCR  - CHLAMYDIA TRACHOMATIS PCR  - NEISSERIA GONORRHOEA PCR  - CHLAMYDIA TRACHOMATIS PCR  - Lipid panel reflex to direct LDL Non-fasting; Future  - HPV9 (Gardasil 9 )    3. Contact with and (suspected) exposure to infections with a predominantly sexual mode of transmission  - Treponema Abs w Reflex to RPR and Titer  - HIV Antigen Antibody Combo    4. ABARM (obstructive sleep apnea)    5. Encounter for therapeutic drug monitoring  - Basic metabolic panel    6. Primary insomnia  - zolpidem (AMBIEN) 10 MG tablet; Take 1 tablet (10 mg) by mouth nightly as needed for sleep  Dispense: 30 tablet; Refill: 0    7. Shift work sleep disorder  - zolpidem (AMBIEN) 10 MG tablet; Take 1 tablet (10 mg) by mouth nightly as needed for sleep  Dispense: 30 tablet; Refill: 0    8. Other fatigue  - Testosterone total  - TSH with free T4 reflex    9. Tobacco use disorder  - varenicline (CHANTIX) 1 MG tablet; Take 1 tablet (1 mg) by mouth 2 times daily  Dispense: 60 tablet; Refill: 4    10. Status post orchiectomy  - Adult Urology  Referral; Future    11. Scrotal pain  - Adult Urology Referral; Future    12. Bee allergy status  - EPINEPHrine (ANY BX GENERIC EQUIV) 0.3 MG/0.3ML injection 2-pack; Inject 0.3 mLs (0.3 mg) into the muscle once as needed for anaphylaxis  Dispense: 0.6 mL; Refill: 1    Please call or return to clinic if your symptoms don't go away.    Follow up plan    Return in about 3 months (around 10/8/2021) for Follow up PrEP .    Thank you for coming to [facility] today.  COVID-19 Vaccine:  If you are eligible for the COVID-19 vaccine, you can schedule via [employer] or call [facility] at [phone]. If you need assistance with scheduling, please speak to a Care Coordinator or your provider.   Lab Testing:  **If you had lab testing today and your results are reassuring or normal they will be mailed to you or sent through [employer] within 7 days.   **If the lab tests need quick action we will call you with the results.  **If you are having labs done on a different day, please call [phone] to schedule at [facility] or [phone] for other [facility] locations.   The phone number we will call with results is # [phone] (home) . If this is not the best number please call our clinic and change the number.  Medication Refills:  If you need any refills please call your pharmacy and they will contact us.   If you need to  your refill at a new pharmacy, please contact the new pharmacy directly. The new pharmacy will help you get your medications transferred faster.   Scheduling:  If you have any concerns about today's visit or wish to schedule another appointment please call our office during normal business hours [phone] (8-5:00 M-F)  If a referral was made to a [facility] and you don't get a call from central scheduling please call [phone].  If a Mammogram was ordered for you at [facility] call [phone] to schedule or change your  appointment.  If you had an EKG/XRay/CT/Ultrasound/MRI ordered the number is [phone] to schedule or change your radiology appointment.   Medical Concerns:  If you have urgent medical concerns please call [phone] at any time of the day.    Mitra Payton DO

## 2021-07-08 NOTE — PROGRESS NOTES
Assessment & Plan     Encounter to establish care    Encounter for health maintenance examination in adult  HCM updated. Confirmed w/ insurance that Gardasil is covered.   - Neisseria gonorrhoeae PCR  - Chlamydia trachomatis PCR  - NEISSERIA GONORRHOEA PCR  - CHLAMYDIA TRACHOMATIS PCR  - NEISSERIA GONORRHOEA PCR  - CHLAMYDIA TRACHOMATIS PCR  - Lipid panel reflex to direct LDL Non-fasting  - HPV9 (Gardasil 9 )    Contact with and (suspected) exposure to infections with a predominantly sexual mode of transmission  PrEP therapy -- labs updated. Will plan HIV q3mos, BMP and other STI screen q6mos per protocol. Does not need refill at this time - will let me know.   - Treponema Abs w Reflex to RPR and Titer  - HIV Antigen Antibody Combo    Encounter for therapeutic drug monitoring  - Basic metabolic panel    ABRAM (obstructive sleep apnea)  Intermittent CPAP use.    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 Okay for sporadic fill.   - suggested higher dose sublingual melatonin for flipping sched as well   - zolpidem (AMBIEN) 10 MG tablet  Dispense: 30 tablet; Refill: 0    Other fatigue  Past hx of low-normal T and sx improved w/ testosterone therapy. Will recheck.   - Testosterone total  - TSH with free T4 reflex    Tobacco use disorder  On maintenance right now, will continue for a few months and revisit if needed  - varenicline (CHANTIX) 1 MG tablet  Dispense: 60 tablet; Refill: 4    Status post orchiectomy  Scrotal pain  Hx of complicated scrotal surgical complications ultimately resulting in L orchiectomy in [place] in 2009. Dull aching pain x1y sounds less c/w nerve pain. Exam unremarkable without hernia or mass. Discussed US to eval vs discuss with Urology and decided on latter.   - Adult Urology Referral    Bee allergy status  - EPINEPHrine (ANY BX GENERIC EQUIV) 0.3 MG/0.3ML injection 2-pack  Dispense: 0.6 mL; Refill: 1      Review of  "prior external note(s) from - CareEverywhere information from [facility] reviewed  45 minutes spent on the date of the encounter doing chart review, history and exam, documentation and further activities per the note       Return in about 3 months (around 10/8/2021) for Follow up PrEP .    Mitra Payton DO  [facility] KAYLA Morales is a 40 year old who presents for the following health issues     Chief Complaint   Patient presents with     [facility] Care     New patient     Consult     testosterone , HPV vaccine     Pain     x 1 year, heavy achy pain in groin region     HPI     Low testosterone: 2016 -- starting to feels [address] now as did then.   Has one testicle -- varicolcelectomy >> post op infection, had surgically removed     2016 put on weight with no reason, weight gain, loss of libido, T was low end of normal -- said if you wan to try T you can, worked really well, went off when went to [place]. Now wants to test again.   - other sx: weight gain, thinning beard     Groin pain: started 1 year ago -- dull heavy ache on the L and posterior, not the testicle, it's inside the scrotum. Doesn't feel any lumps. Thought maybe it was a hernia.   - doesn't interfere w/ urinary, sexual function. Doesn't matter what kind of underwear he wears.     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     Review of Systems   Pertinent positives and negatives per HPI.        Objective    /88   Pulse 73   Temp 97.8  F (36.6  C) (Oral)   Ht 1.85 m (6' 0.84\")   Wt 123.6 kg (272 lb 6.4 oz)   SpO2 97%   BMI 36.10 kg/m    Body mass index is 36.1 kg/m .  Physical Exam   GENERAL: healthy, alert and no distress  EYES: Eyes grossly normal to inspection, PERRL and conjunctivae and sclerae normal  HENT: mouth without ulcers or lesions  NECK: no adenopathy, no asymmetry, masses, or scars and thyroid normal to palpation  RESP: lungs clear to auscultation - no " rales, rhonchi or wheezes  CV: regular rate and rhythm, normal S1 S2, no S3 or S4, no murmur, click or rub, no peripheral edema and peripheral pulses strong   (male): penis normal. Scrotum skin normal in appearance; asymmetric c/w hx of L orchiectomy. R testicle normal to palpation w/o tenderness or masses, no masses palpated in scrotum. No hernia.   MS: no gross musculoskeletal defects noted, no edema  SKIN: no suspicious lesions or rashes  NEURO: Normal strength and tone, mentation intact and speech normal  PSYCH: mentation appears normal, affect normal/bright

## 2021-07-09 LAB
C TRACH DNA SPEC QL NAA+PROBE: NEGATIVE
CHOLEST SERPL-MCNC: 180 MG/DL
HDLC SERPL-MCNC: 36 MG/DL
LDLC SERPL CALC-MCNC: 90 MG/DL
N GONORRHOEA DNA SPEC QL NAA+PROBE: NEGATIVE
NONHDLC SERPL-MCNC: 144 MG/DL
SPECIMEN SOURCE: NORMAL
TRIGL SERPL-MCNC: 270 MG/DL

## 2021-07-10 LAB — TESTOST SERPL-MCNC: 388 NG/DL (ref 240–950)

## 2021-08-25 VITALS — WEIGHT: 265 LBS | HEIGHT: 73 IN | BODY MASS INDEX: 35.12 KG/M2

## 2021-08-25 ASSESSMENT — MIFFLIN-ST. JEOR: SCORE: 2165.91

## 2021-08-25 NOTE — PROGRESS NOTES
Andrew is a 40 year old who is being evaluated via a billable video visit.      How would you like to obtain your AVS? MyChart  If the video visit is dropped, the invitation should be resent by: Text to cell phone: [phone]  Will anyone else be joining your video visit? No    Colleen Toribio CMA  Does patient have any form of state insurance? no    Do you have wifi? yes  Do you have a smart phone? yes  Can you download an caroline on your phone comfortably with out assistance? yes  Can you watch a [employer] video? yes    Video Start Time: 8:31 AM  Video-Visit Details    Type of service:  Video Visit    Video End Time:9:17 AM    Originating Location (pt. Location): Home    Distant Location (provider location):  [facility]     Platform used for Video Visit: LeiWell

## 2021-08-25 NOTE — PATIENT INSTRUCTIONS
Today, we looked at CPAP masks including the AirFit F30i and the Dreamwear full face mask.  Each of us has a built in tendency to find it easier to stay up late at night or get up early in the morning.  Being a  night owl  or  early bird  is a function of our internal body clock.  When you are forced to keep a sleep schedule that goes against your typical habits or if you change your sleep schedule on different days of the week, insomnia can be the result.  Try the following changes if you work the night shift to see if you can improve your sleep patterns:    Nap before work    Drink caffeine before driving to work    Brighten the lights during the first half of your shift if you can     Dim light in work areas for the last few hours of the night if you can    If the sun is up for your trip home wear dark UV blocking sunglasses    Make getting your sleep a priority .you ll feel better during the time you re awake    Schedule things in your personal life around your sleep schedule and talk about it with your family. This will help your family understand your need to sleep seven to eight hours a day.    May sure your sleeping area is right to get good sleep by turning off your phone, darken the room, use a fan to drown out excessive house noise and clear your schedule until you ve gotten your sleep.      If you are too tired or sleepy to drive after a night shift, nap for 15 - 30 minutes before leaving work or call for a ride.     Remember that you will be affected by lack of sleep if you have been awake for more than 16 hours so be extra careful.      Your BMI is Body mass index is 34.96 kg/m .  Weight management is a personal decision.  If you are interested in exploring weight loss strategies, the following discussion covers the approaches that may be successful. Body mass index (BMI) is one way to tell whether you are at a healthy weight, overweight, or obese. It measures your weight in relation to your height.  A  BMI of 18.5 to 24.9 is in the healthy range. A person with a BMI of 25 to 29.9 is considered overweight, and someone with a BMI of 30 or greater is considered obese. More than two-thirds of American adults are considered overweight or obese.  Being overweight or obese increases the risk for further weight gain. Excess weight may lead to heart disease and diabetes.  Creating and following plans for healthy eating and physical activity may help you improve your health.  Weight control is part of healthy lifestyle and includes exercise, emotional health, and healthy eating habits. Careful eating habits lifelong are the mainstay of weight control. Though there are significant health benefits from weight loss, long-term weight loss with diet alone may be very difficult to achieve- studies show long-term success with dietary management in less than 10% of people. Attaining a healthy weight may be especially difficult to achieve in those with severe obesity. In some cases, medications, devices and surgical management might be considered.  What can you do?  If you are overweight or obese and are interested in methods for weight loss, you should discuss this with your provider.     Consider reducing daily calorie intake by 500 calories.     Keep a food journal.     Avoiding skipping meals, consider cutting portions instead.    Diet combined with exercise helps maintain muscle while optimizing fat loss. Strength training is particularly important for building and maintaining muscle mass. Exercise helps reduce stress, increase energy, and improves fitness. Increasing exercise without diet control, however, may not burn enough calories to loose weight.       Start walking three days a week 10-20 minutes at a time    Work towards walking thirty minutes five days a week     Eventually, increase the speed of your walking for 1-2 minutes at time    In addition, we recommend that you review healthy lifestyles and methods for weight  loss available through the National Institutes of Health patient information sites:  http://win.niddk.nih.gov/publications/index.htm    And look into health and wellness programs that may be available through your health insurance provider, employer, local community center, or mary club.    Weight management plan: Patient was referred to their PCP to discuss a diet and exercise plan.

## 2021-08-26 ENCOUNTER — VIRTUAL VISIT (OUTPATIENT)
Dept: SLEEP MEDICINE | Facility: CLINIC | Age: 41
End: 2021-08-26
Payer: COMMERCIAL

## 2021-08-26 ENCOUNTER — OFFICE VISIT (OUTPATIENT)
Dept: FAMILY MEDICINE | Facility: CLINIC | Age: 41
End: 2021-08-26
Payer: COMMERCIAL

## 2021-08-26 VITALS
WEIGHT: 273.4 LBS | TEMPERATURE: 98 F | RESPIRATION RATE: 16 BRPM | SYSTOLIC BLOOD PRESSURE: 126 MMHG | OXYGEN SATURATION: 95 % | BODY MASS INDEX: 37.03 KG/M2 | HEART RATE: 83 BPM | HEIGHT: 72 IN | DIASTOLIC BLOOD PRESSURE: 85 MMHG

## 2021-08-26 DIAGNOSIS — Z23 ENCOUNTER FOR IMMUNIZATION: ICD-10-CM

## 2021-08-26 DIAGNOSIS — F17.200 TOBACCO USE DISORDER: ICD-10-CM

## 2021-08-26 DIAGNOSIS — D18.01 CHERRY HEMANGIOMA: ICD-10-CM

## 2021-08-26 DIAGNOSIS — D22.5 MELANOCYTIC NEVUS OF TRUNK: ICD-10-CM

## 2021-08-26 DIAGNOSIS — G47.26 SHIFT WORK SLEEP DISORDER: ICD-10-CM

## 2021-08-26 DIAGNOSIS — G47.33 OSA (OBSTRUCTIVE SLEEP APNEA): Primary | ICD-10-CM

## 2021-08-26 DIAGNOSIS — E66.01 MORBID OBESITY (H): ICD-10-CM

## 2021-08-26 DIAGNOSIS — K21.9 GASTROESOPHAGEAL REFLUX DISEASE, UNSPECIFIED WHETHER ESOPHAGITIS PRESENT: Primary | ICD-10-CM

## 2021-08-26 DIAGNOSIS — Z71.6 ENCOUNTER FOR SMOKING CESSATION COUNSELING: ICD-10-CM

## 2021-08-26 PROCEDURE — 99214 OFFICE O/P EST MOD 30 MIN: CPT | Mod: 25 | Performed by: STUDENT IN AN ORGANIZED HEALTH CARE EDUCATION/TRAINING PROGRAM

## 2021-08-26 PROCEDURE — 90471 IMMUNIZATION ADMIN: CPT | Performed by: STUDENT IN AN ORGANIZED HEALTH CARE EDUCATION/TRAINING PROGRAM

## 2021-08-26 PROCEDURE — 90651 9VHPV VACCINE 2/3 DOSE IM: CPT | Performed by: STUDENT IN AN ORGANIZED HEALTH CARE EDUCATION/TRAINING PROGRAM

## 2021-08-26 PROCEDURE — 99204 OFFICE O/P NEW MOD 45 MIN: CPT | Mod: 95 | Performed by: PHYSICIAN ASSISTANT

## 2021-08-26 RX ORDER — BUPROPION HYDROCHLORIDE 150 MG/1
TABLET ORAL
Qty: 60 TABLET | Refills: 3 | Status: SHIPPED | OUTPATIENT
Start: 2021-08-26 | End: 2022-05-26

## 2021-08-26 RX ORDER — FLUTICASONE PROPIONATE 50 MCG
1 SPRAY, SUSPENSION (ML) NASAL DAILY
COMMUNITY
Start: 2021-08-26

## 2021-08-26 ASSESSMENT — MIFFLIN-ST. JEOR: SCORE: 2195.13

## 2021-08-26 NOTE — PROGRESS NOTES
Assessment & Plan     Gastroesophageal reflux disease, unspecified whether esophagitis present  Episodic sx more persistent and severe over past 2 years. Has changed habit, watched for dietary triggers w/o improvement. No red flag sx but tobacco hx and family hx of Jiang's - shared decision making for EGD. If normal can discuss H2 blocker vs PPI to avoid daily chronic PPI use. Also reviewed head of bed elevation -- defers but could consider a trial.   - Adult Gastro Ref - Procedure Only    Tobacco use disorder  Encounter for smoking cessation counseling  Was on chantix w/ success but causing nausea. Switch to bupropion.   - buPROPion (WELLBUTRIN XL) 150 MG 24 hr tablet  Dispense: 60 tablet; Refill: 3    Encounter for immunization  - HPV9 (Gardasil 9 )    Carrying extra weight    Melanocytic nevus of trunk  Skin check - benign appearing nevi on back. CTM.     Younger hemangioma  Scattered small to back. Small 3mm lesion over left ear tragus appears most c/w a darker cherry hemangioma. No concerning features on dermoscopy. If growing further, refer to Derm.       Prescription drug management  30 minutes spent on the date of the encounter doing chart review, history and exam, documentation and further activities per the note       Return in about 3 months (around 11/26/2021) for PrEP labs.    Mitra Payton DO  [facility] KAYLA Morales is a 40 year old who presents for the following health issues     Patient Request and Gastrophageal Reflux       HPI       GERD: on and off for years, 2 years ago started getting wrose.     Takes omeprazole for 1-2 mos, improves it for a time, then comes back.   Wakes him up at night, substernal burning.     Now doing BID omeprazole.     Family hx Jiang's esophagus in gradnfather, did have esophageal surg in 40s, ETOH use d/o     COVID ICU reopened recently   Mostly quit 6 weeks ago. Had a few cigarettes past few weeks  Minimal ETOH use     Tries  "not to eat after 8pm / 3h before lying down     Chantix -- 2 mos. Now nausea. Wants to start bupropion.       Review of Systems   Pertinent positives and negatives per HPI.        Objective    /85   Pulse 83   Temp 98  F (36.7  C) (Oral)   Resp 16   Ht 1.84 m (6' 0.44\")   Wt 124 kg (273 lb 6.4 oz)   SpO2 95%   BMI 36.63 kg/m    Body mass index is 36.63 kg/m .  Physical Exam   GENERAL: alert, cooperative, in no acute distress  HEENT: sclera clear, moist mucous membranes   PULM: normal respiratory effort   CV: regular rate, extremities warm and well perfused   NEURO: alert and oriented, grossly intact, moves all extremities, normal gait   SKIN: over left ear tragus, small 3mm bluish purple lesion, not raised, not tender. No surrounding lymphadenopathy. No irregular borders, vascularity, or multiple colors on dermatoscope. Back skin exam: few scattered tiny cherry hemangiomas. One 5mm flat papular light brown nevus in upper mid back, located in lower left wing of tattoo. One other small benign appearing brown nevus.   PSYCH: euthymic affect        "

## 2021-08-26 NOTE — PROGRESS NOTES
Sleep Consultation:    Date on this visit: 8/26/2021    Andrew Coulter is sent by No ref. provider found for a sleep consultation regarding ABRAM.    Primary Physician: Mitra Payton ESTELLA Coulter presents to re-establish care for mild ABRAM. His medical history is significant for anxiety/depression, hyperlipidemia, obesity.     He was diagnosed with ABRAM in 2004. His most recent study was 10/2013 in [place]. His AHI was 7.5/hr, RDI 7.8/hr, supine AHI 51.4/hr and O2 luis alberto 88%. He had a PLM index of 38.5/hr, 16.7/hr were associated with arousals. His weight was 251 pounds at the time. He had some persistent snoring and airflow limitation on CPAP 12 cm when supine.     Andrew has not used CPAP much in the last 3 years. He presents because he would like to resume treatment and he needs new supplies. His Resmed auto CPAP was set to 9-14 cm.  He uses the F&P Simplus FF mask. He had tried a dental device in the past and it was ineffective.  He had been using CPAP consistently before leaving for [facility] because he was going to have trouble getting supplies. He got by ok but was a little more fatigued and definitely snored more. He would like to resume CPAP. He is waking with a sore throat.      Andrew goes to sleep at 10:00-11:00 PM during the week. He wakes up at 6:00 AM (+/- 15 min) with an alarm. He falls asleep in 5-10 minutes.  Andrew denies difficulty falling asleep. He occasionally uses zolpidem 5 mg when he changes shifts (maybe once per week). He more regularly uses 5 mg melatonin. He wakes up 0-1 times a night. About 10-20% of the time, he wakes around 3 AM and struggles to get back to sleep in a couple of hours. That has been happening less. He will get up and read for a while or sleep on the couch. Andrew wakes up to uncertain reasons.  On weekends, Andrew goes to sleep at 10:00-11:00 PM.  He wakes up at 7:30-8:00 AM without an alarm. He falls asleep in 5-10 minutes. If he works nights, he will try to  sleep 9:30 AM to 4-4:30 PM if he can. The night before starting nights, he will try to stay up until 6-7 AM and sleep until 3 PM. Patient gets an average of 6-8 hours of sleep per night.     Patient does use electronics in bed (mostly avoids it). He sometimes has a racing mind at night, but it is rare. He does not watch TV in bed and read in bed. He really does a pretty good job at limiting the bed to sleep and sex.    Andrew does do shift work.  He works day and night shifts.  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 If he does work nights, he will work nights for 1-2 weeks and then days for 4-5 weeks. He spent the last 3 years working in [employer].    Andrew thinks he does snore every night and snoring is loud. This was reported by people in [facility]. He denies waking himself with a snort. Patient does not have a regular bed partner. There is not report of gasping, snorting or witnessed apneas. Patient sleeps on his side and stomach. He denies morning headaches, morning confusion and restless legs. Andrew has occasional sleep talking and denies any bruxism, sleep walking, dream enactment, sleep paralysis, cataplexy and hypnogogic/hypnopompic hallucinations.    Andrew has difficulty breathing through his nose, reflux at night and heartburn (occasional, diet related), denies claustrophobia.  He uses fluticasone for allergies.     Andrew has gained 15 pounds in the last 8 years, but is down 5 pounds in the last month.  Patient describes themself as a morning person.  He would prefer to go to sleep at 10-11:00 PM and wake up at 7:00-8:00 AM.  Patient's [place] Sleepiness score 8/24 inconsistent with daytime sleepiness.      Andrew rarely naps, generally does not need naps. He takes rare inadvertant naps.  He denies dozing while driving.  Patient was counseled on the importance of driving while alert, to pull over if drowsy, or nap before getting into the vehicle if sleepy.  He uses 2 cups/day of coffee. Last caffeine  intake is usually in the first half of the day.    Allergies:    Allergies   Allergen Reactions     Bees Anaphylaxis     Penicillins Anaphylaxis     Clindamycin      Fenofibrate Muscle Pain (Myalgia)       Medications:    Current Outpatient Medications   Medication Sig Dispense Refill     atorvastatin (LIPITOR) 40 MG tablet Take 1 tablet (40 mg) by mouth daily (Patient taking differently: Take 20 mg by mouth daily Taking 1/2 tab daily) 90 tablet 3     emtricitabine-tenofovir (TRUVADA) 200-300 MG per tablet Take 1 tablet by mouth daily Needs appointment with a new primary care provider within 3 month since Dr. Osullivan has left.  Please schedule ASAP. 90 tablet 0     EPINEPHrine (ANY BX GENERIC EQUIV) 0.3 MG/0.3ML injection 2-pack Inject 0.3 mLs (0.3 mg) into the muscle once as needed for anaphylaxis 0.6 mL 1     fluticasone (FLONASE) 50 MCG/ACT nasal spray Spray 1 spray into both nostrils daily       melatonin 5 MG tablet Take 1 tablet (5 mg) by mouth nightly as needed for sleep       zolpidem (AMBIEN) 10 MG tablet Take 1 tablet (10 mg) by mouth nightly as needed for sleep 30 tablet 0       Problem List:  Patient Active Problem List    Diagnosis Date Noted     Cervicalgia 09/06/2016     Priority: Medium     Acquired postural kyphosis 09/06/2016     Priority: Medium     Testosterone deficiency 03/24/2016     Priority: Medium     Overweight 03/17/2016     Priority: Medium     ABRAM (obstructive sleep apnea) 03/17/2016     Priority: Medium     Intermittent CPAP       PReP therapy 03/17/2016     Priority: Medium     On PReP therapy       Anxiety 03/17/2016     Priority: Medium     Low testosterone 03/17/2016     Priority: Medium     Patient has single testicle, not interested in banking sperm, lost testicle from infection       Mixed hyperlipidemia 03/17/2016     Priority: Medium        Past Medical/Surgical History:  Past Medical History:   Diagnosis Date     Gastroesophageal reflux disease      Hyperlipidemia      Past  Surgical History:   Procedure Laterality Date     HERNIA REPAIR       ORCHIECTOMY INGUINAL Left 2011     SEPTOPLASTY       VARICOCELECTOMY Left 2009    ** repair       Social History:  Social History     Socioeconomic History     Marital status: Single     Spouse name: Not on file     Number of children: Not on file     Years of education: Not on file     Highest education level: Not on file   Occupational History     Not on file   Tobacco Use     Smoking status: Former Smoker     Packs/day: 0.50     Years: 12.00     Pack years: 6.00     Types: Cigarettes     Quit date: 2021     Years since quittin.1     Smokeless tobacco: Never Used     Tobacco comment: using Chantix, wants to switch to Wellbutrin   Substance and Sexual Activity     Alcohol use: Yes     Alcohol/week: 0.0 standard drinks     Comment: 3-4 times per month, 1-2 drinks     Drug use: No     Sexual activity: Yes   Other Topics Concern     Parent/sibling w/ CABG, MI or angioplasty before 65F 55M? Not Asked   Social History Narrative     Not on file     Social Determinants of Health     Financial Resource Strain:      Difficulty of Paying Living Expenses:    Food Insecurity:      Worried About Running Out of Food in the Last Year:      Ran Out of Food in the Last Year:    Transportation Needs:      Lack of Transportation (Medical):      Lack of Transportation (Non-Medical):    Physical Activity:      Days of Exercise per Week:      Minutes of Exercise per Session:    Stress:      Feeling of Stress :    Social Connections:      Frequency of Communication with Friends and Family:      Frequency of Social Gatherings with Friends and Family:      Attends [employer] Services:      Active Member of Clubs or Organizations:      Attends Club or Organization Meetings:      Marital Status:    Intimate Partner Violence:      Fear of Current or Ex-Partner:      Emotionally Abused:      Physically Abused:      Sexually Abused:        Family History:  Family  "History   Problem Relation Age of Onset     Hyperlipidemia Mother      Depression Mother      Sleep Apnea Mother      Hyperlipidemia Father      Anxiety Disorder Father      Substance Abuse Father      Hypertension Maternal Grandmother      Anxiety Disorder Maternal Grandmother      Hypertension Maternal Grandfather      Diabetes Maternal Grandfather      Substance Abuse Paternal Grandfather      Cerebrovascular Disease Paternal Grandfather        Review of Systems:  A complete review of systems reviewed by me is negative with the exeption of what has been mentioned in the history of present illness.  CONSTITUTIONAL: NEGATIVE for weight gain/loss, fever, chills, sweats or night sweats, drug allergies.  EYES: NEGATIVE for changes in vision, blind spots, double vision.  ENT: NEGATIVE for ear pain, sore throat, sinus pain, post-nasal drip, runny nose, bloody nose  CARDIAC: NEGATIVE for fast heartbeats or fluttering in chest, chest pain or pressure, breathlessness when lying flat, swollen legs or swollen feet.  NEUROLOGIC: NEGATIVE headaches, weakness or numbness in the arms or legs.  DERMATOLOGIC: NEGATIVE for rashes, new moles or change in mole(s)  PULMONARY: NEGATIVE SOB at rest, SOB with activity, dry cough, productive cough, coughing up blood, wheezing or whistling when breathing.    GASTROINTESTINAL: NEGATIVE for nausea or vomitting, loose or watery stools, fat or grease in stools, constipation, abdominal pain, bowel movements black in color or blood noted.  GENITOURINARY: NEGATIVE for pain during urination, blood in urine, urinating more frequently than usual, irregular menstrual periods.  MUSCULOSKELETAL: NEGATIVE for muscle pain, bone or joint pain, swollen joints.  ENDOCRINE: NEGATIVE for increased thirst or urination, diabetes.  LYMPHATIC: NEGATIVE for swollen lymph nodes, lumps or bumps in the breasts or nipple discharge.    Physical Examination:  Vitals: Ht 1.854 m (6' 1\")   Wt 120.2 kg (265 lb)   BMI " 34.96 kg/m    BMI= Body mass index is 34.96 kg/m .         [place] Total Score 8/18/2021   Total score - [place] 8            GENERAL APPEARANCE: healthy, alert, no distress and cooperative  EYES: Eyes grossly normal to inspection  HENT: oropharynx crowded and tongue base enlarged  NECK: no asymmetry, masses, or scars  RESP: no respiratory distress, cough or wheeze  Mallampati Class: IV.  Tonsillar Stage: not visualized.    Impression/Plan:    (G47.33) ABRAM (obstructive sleep apnea)  (primary encounter diagnosis)  Comment: Andrew's most recent PSG in 2013 showed positional apnea, overall AHI was 7.5/hr, but his supine AHI was 51/hr. He was last seen by me in 2018. He was in [employer] for work for the last 3 years and was not using CPAP due to difficulty getting supplies. He tries to avoid supine sleep, but still notices more fatigue, snoring and sore throat without CPAP. He presents today to get re-initiated on CPAP. His weight is up about 15-20 pounds compared to at his last sleep study.   Plan: Comprehensive DME        Resume auto CPAP 9-14 cm. A prescription was written for new supplies. We looked at the AirFit F30i and Dreamwear full face masks. We reviewed recommendations for cleaning and replacing supplies.    (G47.26) Shift work sleep disorder  Comment: He rotates between night and day shift. He has 1-2 weeks of nights every 6 weeks or so. He does avoid light in the morning. He uses zolpidem sporadically and melatonin regularly. He sleeps reasonably well for the most part.  Plan: We discussed regulation of light exposure. Could consider trying to increase light exposure in the first half of the night using a SAD lamp when transitioning to night shift. Continue current sleep aids.        Literature provided regarding circadian rhythm disorders.      He will follow up with me in approximately 2-3 months.       Polysomnography reviewed.  Obstructive sleep apnea reviewed.  Complications of untreated sleep apnea  were reviewed.    58 minutes were spent on the date of the encounter doing chart review, history and exam, documentation and further activities as noted above.  Bennett Goltz, PA-C     CC: No ref. provider found

## 2021-08-27 ENCOUNTER — OFFICE VISIT (OUTPATIENT)
Dept: UROLOGY | Facility: CLINIC | Age: 41
End: 2021-08-27
Attending: STUDENT IN AN ORGANIZED HEALTH CARE EDUCATION/TRAINING PROGRAM
Payer: COMMERCIAL

## 2021-08-27 VITALS — DIASTOLIC BLOOD PRESSURE: 98 MMHG | HEART RATE: 85 BPM | SYSTOLIC BLOOD PRESSURE: 146 MMHG | OXYGEN SATURATION: 97 %

## 2021-08-27 DIAGNOSIS — R03.0 ELEVATED BP WITHOUT DIAGNOSIS OF HYPERTENSION: Primary | ICD-10-CM

## 2021-08-27 DIAGNOSIS — Z90.79 STATUS POST ORCHIECTOMY: ICD-10-CM

## 2021-08-27 DIAGNOSIS — R10.2 PELVIC PAIN: ICD-10-CM

## 2021-08-27 PROCEDURE — 99244 OFF/OP CNSLTJ NEW/EST MOD 40: CPT | Performed by: UROLOGY

## 2021-08-27 NOTE — PATIENT INSTRUCTIONS
Please call [facility] to schedule a CT scan. [phone]. We will contact you with results.  Please contact your insurance company to make sure the CT scan is covered under your insurance plan.   Please call our office if you have questions or need to report any information, [phone].

## 2021-08-27 NOTE — PROGRESS NOTES
S: Patient is pleasant 40-year-old male who was requested to be seen by Dr. Payton for a consultation with regard to patient's pelvic discomfort.  Patient complains of intermittent pelvic discomfort for the last year.  Pain sometimes associated with ejaculation.  Pain is dull and ache.  It comes and goes. He has no urinary problems.  He has no penile discharge.  Patient has history of pelvic pain any years ago.  He underwent left varicocelectomy for it.  Postop course was complicated by orchitis and testicular atrophy.  He eventually had this left testicle removed.  He is active.  Current Outpatient Medications   Medication Sig Dispense Refill     atorvastatin (LIPITOR) 40 MG tablet Take 1 tablet (40 mg) by mouth daily (Patient taking differently: Take 20 mg by mouth daily Taking 1/2 tab daily) 90 tablet 3     buPROPion (WELLBUTRIN XL) 150 MG 24 hr tablet Take one pill once daily x3-5 days, then increase to 2 pills once daily. 60 tablet 3     emtricitabine-tenofovir (TRUVADA) 200-300 MG per tablet Take 1 tablet by mouth daily Needs appointment with a new primary care provider within 3 month since Dr. Osullivan has left.  Please schedule ASAP. 90 tablet 0     EPINEPHrine (ANY BX GENERIC EQUIV) 0.3 MG/0.3ML injection 2-pack Inject 0.3 mLs (0.3 mg) into the muscle once as needed for anaphylaxis 0.6 mL 1     fluticasone (FLONASE) 50 MCG/ACT nasal spray Spray 1 spray into both nostrils daily       melatonin 5 MG tablet Take 1 tablet (5 mg) by mouth nightly as needed for sleep       zolpidem (AMBIEN) 10 MG tablet Take 1 tablet (10 mg) by mouth nightly as needed for sleep 30 tablet 0     Allergies   Allergen Reactions     Bees Anaphylaxis     Penicillins Anaphylaxis     Clindamycin      Fenofibrate Muscle Pain (Myalgia)     Past Medical History:   Diagnosis Date     Gastroesophageal reflux disease      Hyperlipidemia      Past Surgical History:   Procedure Laterality Date     HERNIA REPAIR       ORCHIECTOMY INGUINAL Left  2011     SEPTOPLASTY      2010 about     VARICOCELECTOMY Left 2009    ** repair      Family History   Problem Relation Age of Onset     Hyperlipidemia Mother      Depression Mother      Sleep Apnea Mother      Hyperlipidemia Father      Anxiety Disorder Father      Substance Abuse Father      Hypertension Maternal Grandmother      Anxiety Disorder Maternal Grandmother      Hypertension Maternal Grandfather      Diabetes Maternal Grandfather      Substance Abuse Paternal Grandfather      Cerebrovascular Disease Paternal Grandfather      Social History     Socioeconomic History     Marital status: Single     Spouse name: None     Number of children: None     Years of education: None     Highest education level: None   Occupational History     None   Tobacco Use     Smoking status: Former Smoker     Packs/day: 0.50     Years: 12.00     Pack years: 6.00     Types: Cigarettes     Quit date: 2021     Years since quittin.1     Smokeless tobacco: Never Used     Tobacco comment: using Chantix, wants to switch to Wellbutrin   Substance and Sexual Activity     Alcohol use: Yes     Alcohol/week: 0.0 standard drinks     Comment: 3-4 times per month, 1-2 drinks     Drug use: No     Sexual activity: Yes   Other Topics Concern     Parent/sibling w/ CABG, MI or angioplasty before 65F 55M? Not Asked   Social History Narrative     None     Social Determinants of Health     Financial Resource Strain:      Difficulty of Paying Living Expenses:    Food Insecurity:      Worried About Running Out of Food in the Last Year:      Ran Out of Food in the Last Year:    Transportation Needs:      Lack of Transportation (Medical):      Lack of Transportation (Non-Medical):    Physical Activity:      Days of Exercise per Week:      Minutes of Exercise per Session:    Stress:      Feeling of Stress :    Social Connections:      Frequency of Communication with Friends and Family:      Frequency of Social Gatherings with Friends and Family:       Attends Synagogue Services:      Active Member of Clubs or Organizations:      Attends Club or Organization Meetings:      Marital Status:    Intimate Partner Violence:      Fear of Current or Ex-Partner:      Emotionally Abused:      Physically Abused:      Sexually Abused:        REVIEW OF SYSTEMS  =================  C: NEGATIVE for fever, chills, change in weight  I: NEGATIVE for worrisome rashes, moles or lesions  E/M: NEGATIVE for ear, mouth and throat problems  R: NEGATIVE for significant cough or SHORTNESS OF BREATH  CV:  NEGATIVE for chest pain, palpitations or peripheral edema  GI: NEGATIVE for nausea, abdominal pain, heartburn, or change in bowel habits  NEURO: NEGATIVE numbness/weakness  : see HPI  PSYCH: NEGATIVE depression/anxiety  LYmph: no new enlarged lymph nodes  Ortho: no new trauma/movements      Physical Exam:  BP (!) 146/98 (BP Location: Right arm, Patient Position: Sitting, Cuff Size: Adult Regular)   Pulse 85   SpO2 97%    Patient is pleasant, in no acute distress, good general condition.  Heart:  negative, PMI normal  Lung: no evidence of respiratory distress    Abdomen: Soft, nondistended, non tender. No masses. No rebound or guarding.   Exam: Penis no discharge.  Right testes normal.  No scrotal skin lesion.  Some tenderness with palpation over the inguinal region.  Skin: Warm and dry.  No redness.  Neuro: grossly normal  Musculaskeletal: moving all extremities  Psych normal mood and affect  Musculoskeletal  moving all extremities  Hematologic/Lymphatic/Immunologic: normal ant/post cervical, axillary, supraclavicular and inguinal nodes    Assessment/Plan:   Pleasant 40-year-old male with pelvic pain most likely due to pelvic muscle issues.  We'll schedule patient for CT scan to rule out other pathology giving the duration of his pain.  Referral to physical therapy.    Elevated blood pressure: Follow-up primary care MD.

## 2021-08-31 ENCOUNTER — TELEPHONE (OUTPATIENT)
Dept: GASTROENTEROLOGY | Facility: CLINIC | Age: 41
End: 2021-08-31

## 2021-08-31 DIAGNOSIS — Z11.59 ENCOUNTER FOR SCREENING FOR OTHER VIRAL DISEASES: ICD-10-CM

## 2021-08-31 NOTE — TELEPHONE ENCOUNTER
Screening Questions  1. Are you active on mychart?    2. What insurance is in the chart? BCBS    2.  Ordering/Referring Provider: Mitra Payton DO    3. BMI 36.0    4. Are you on daily home oxygen? No     5. Do you have a history of difficult airway?  no    6. Have you had a heart, lung, or liver transplant?  No     7. Are you currently on dialysis?  No     8. Have you had a stroke or Transient ischemic atttack (TIA) within 6 months? No     9. In the past 6 months, have you had any heart related issues including cardiomyopathy or heart attack?         If yes, did it require cardiac stenting or other implantable device? no    10. Do you have any implantable devices in your body (pacemaker, defib, LVAD)?  no    11. Do you take nitroglycerin? If yes, how often?  no    12. Are you currently taking any blood thinners? no    13. Are you a diabetic?  no    14. (Females) Are you currently pregnant?   If yes, how many weeks?    15. Have you had a procedure in the past that was difficult to tolerate with conscious sedation? Any allergies to Fentanyl or Versed  no    16. Are you taking any scheduled prescription narcotics more than once daily?  no    17. Do you have any chemical dependencies such as alcohol, street drugs, or methadone?  No     18. Do you have any history of post-traumatic stress syndrome or mental health issues?  No     19. Do you transfer independently? yes    20.  Do you have any issues with constipation?     21. Preferred Pharmacy for Pre Prescription     Scheduling Details    Which Colonoscopy Prep was Sent?: Miralax  Procedure Scheduled: Colon  Provider/Surgeon:   Date of Procedure: 9/14/2021  Location: [facility]  Caller (Please ask for phone number if not scheduled by patient): Brittney Coulter      Sedation Type: MAC  Conscious Sedation- Needs  for 6 hours after the procedure  MAC/General-Needs  for 24 hours after procedure    Pre-op Required at [facility], [place], [place] and OR for  MAC sedation:   (if yes advise patient they will need a pre-op prior to procedure)      Is patient on blood thinners? -no (If yes- inform patient to follow up with PCP or provider for follow up instructions)     Informed patient they will need an adult  yes  Cannot take any type of public or medical transportation alone    Informed Patient of COVID Test Requirement yes    Confirmed Nurse will call to complete assessment yes    Additional comments: no

## 2021-09-04 ENCOUNTER — ANCILLARY PROCEDURE (OUTPATIENT)
Dept: CT IMAGING | Facility: CLINIC | Age: 41
End: 2021-09-04
Attending: UROLOGY
Payer: COMMERCIAL

## 2021-09-04 DIAGNOSIS — Z90.79 STATUS POST ORCHIECTOMY: ICD-10-CM

## 2021-09-04 DIAGNOSIS — R10.2 PELVIC PAIN: ICD-10-CM

## 2021-09-04 PROCEDURE — 74176 CT ABD & PELVIS W/O CONTRAST: CPT | Performed by: RADIOLOGY

## 2021-09-07 ENCOUNTER — TELEPHONE (OUTPATIENT)
Dept: GASTROENTEROLOGY | Facility: OUTPATIENT CENTER | Age: 41
End: 2021-09-07

## 2021-09-07 NOTE — TELEPHONE ENCOUNTER
Writer reviewed pre-assessment questions with patient prior to upcoming EGD on 9.14.2021.      Covid test scheduled: 9.10.2021    Arrival time: 0730    Facility location: [facility]    Sedation type: MAC    Sleep Apnea? Yes, CPAP    Electronic Implantable devices? No    Blood thinners/Antiplatelet medication? No    Indication for Procedure: Gastroesophageal reflux disease, unspecified whether esophagitis present    Referring Provider: Mitra Payton DO    Reviewed EGD prep instructions with patient.      Designated  policy reviewed with patient.     Patient verbalized understanding.  No further questions or concerns.    Grisel Redman RN

## 2021-09-09 ENCOUNTER — THERAPY VISIT (OUTPATIENT)
Dept: PHYSICAL THERAPY | Facility: CLINIC | Age: 41
End: 2021-09-09
Payer: COMMERCIAL

## 2021-09-09 DIAGNOSIS — R10.32 LEFT GROIN PAIN: ICD-10-CM

## 2021-09-09 PROCEDURE — 97535 SELF CARE MNGMENT TRAINING: CPT | Mod: GP | Performed by: PHYSICAL THERAPIST

## 2021-09-09 PROCEDURE — 97161 PT EVAL LOW COMPLEX 20 MIN: CPT | Mod: GP | Performed by: PHYSICAL THERAPIST

## 2021-09-09 PROCEDURE — 97110 THERAPEUTIC EXERCISES: CPT | Mod: GP | Performed by: PHYSICAL THERAPIST

## 2021-09-09 NOTE — PROGRESS NOTES
Physical Therapy Initial Evaluation  Subjective:  The history is provided by the patient. No  was used.   Patient Health History  Andrew Coulter being seen for Groin pain.     Problem began: 8/1/2020.   Problem occurred: Unsure   Pain is reported as 1/10 on pain scale.  General health as reported by patient is good.  Pertinent medical history includes: overweight and other. Other medical history details: Testicular surgery.     Medical allergies: other. Other medical allergies details: Pcn, fenofribrate, clyndy.   Surgeries include:  Other. Other surgery history details: Testisular, hernia, septum.    Current medications:  Anti-depressants and other. Other medications details: Truvada, suppliments.    Current occupation is Nurse.   Primary job tasks include:  Lifting/carrying, prolonged sitting, prolonged standing and pushing/pulling.                  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  Had complications that lead to an infection.  Then had scar tissue and removed testicular remnant.  Then pain was gone.  Has a dull ache in the left groin. Insidious onset of pain for 1.5 years. Able to sleep.  Does cross fit, goes to the gym and does paddle boarding. Had abdominal CT which was normal.  No numbness and tingling.  Pain with ejaculation with muscle or with pressure on the are.  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 Also had a hernia repair on the right..         Type of problem:  Pelvic dysfunction.    This is a chronic condition.  Condition occurred with:  Insidious onset.    Patient reports pain:  Groin (left).  Pain is described as aching and is constant.    Since onset symptoms are unchanged.  Symptoms are exacerbated by intercourse  and relieved by nothing.      Restrictions due to condition include:  Working in normal job without restrictions.  Barriers include:  None as reported by  patient.                        Objective:  System                                 Pelvic Dysfunction Evaluation:    Bladder/Pelvic Problems:  Left groin pain, hx of orchectomy            Flexibility:    Tightness present at:Iliopsoas  quadriceps and latissimus  Abdominal Wall:  Abdominal wall pelvic: above umbilicus.  Diastasis Recti:  Present    Scar Mobility:  OK  Pelvic Clock Exam:  normal            SI Provocation:  normal        Reflex Testing:  NA    External Assessment:  normal              Internal Assessment:  NA              SEMG Biofeedback:  NA                              Hip Evaluation  HIP AROM:  AROM:    Left Hip:     Normal    Right Hip:   Normal                    Hip Strength:    Flexion:   Left: 5/5   Pain:  Right: 5/5    Pain: strong/pain free                      Abduction:  Left: 5/5      Pain:strong/pain freeRight: 5/5     Pain:strong/pain free  Adduction:  Left: /5   Pain:strong/pain free                Hip Special Testing:    Left hip positive for the following special tests:  Fadir/Labrum and Ren  Left hip negative for the following special tests:  SLR   Right hip positive for the following special tests:  ThomasRight hip negative for the following special tests:  SLR    Hip Palpation:  Palpations normal left hip: cremaster muscle.  Left hip tenderness present at:   hip flexors    Functional Testing:          Quad:    Single leg squat:   Left:    Right:   Normal control                     General     ROS    Assessment/Plan:    Patient is a 40 year old male with pelvic complaints.    Patient has the following significant findings with corresponding treatment plan.                Diagnosis 1:  Left groin pain  Pain -  manual therapy, self management and education  Decreased ROM/flexibility - manual therapy, therapeutic exercise and home program  Impaired muscle performance - neuro re-education and home program  Decreased function - therapeutic activities and home program    Therapy  Evaluation Codes:   1) History comprised of:   Personal factors that impact the plan of care:      Time since onset of symptoms.    Comorbidity factors that impact the plan of care are:      Overweight.     Medications impacting care: None.  2) Examination of Body Systems comprised of:   Body structures and functions that impact the plan of care:      Pelvis.   Activity limitations that impact the plan of care are:      Lifting, Sitting, Sports, Standing, Walking and Working.  3) Clinical presentation characteristics are:   Stable/Uncomplicated.  4) Decision-Making    Low complexity using standardized patient assessment instrument and/or measureable assessment of functional outcome.  Cumulative Therapy Evaluation is: Low complexity.    Previous and current functional limitations:  (See Goal Flow Sheet for this information)    Short term and Long term goals: (See Goal Flow Sheet for this information)     Communication ability:  Patient appears to be able to clearly communicate and understand verbal and written communication and follow directions correctly.  Treatment Explanation - The following has been discussed with the patient:   RX ordered/plan of care  Anticipated outcomes  Possible risks and side effects  This patient would benefit from PT intervention to resume normal activities.   Rehab potential is good.    Frequency:  2 X a month, once daily  Duration:  for 2 months  Discharge Plan:  Achieve all LTG.  Independent in home treatment program.  Reach maximal therapeutic benefit.    Please refer to the daily flowsheet for treatment today, total treatment time and time spent performing 1:1 timed codes.

## 2021-09-10 ENCOUNTER — LAB (OUTPATIENT)
Dept: LAB | Facility: CLINIC | Age: 41
End: 2021-09-10
Payer: COMMERCIAL

## 2021-09-10 DIAGNOSIS — Z11.59 ENCOUNTER FOR SCREENING FOR OTHER VIRAL DISEASES: ICD-10-CM

## 2021-09-10 LAB — SARS-COV-2 RNA RESP QL NAA+PROBE: NEGATIVE

## 2021-09-10 PROCEDURE — U0003 INFECTIOUS AGENT DETECTION BY NUCLEIC ACID (DNA OR RNA); SEVERE ACUTE RESPIRATORY SYNDROME CORONAVIRUS 2 (SARS-COV-2) (CORONAVIRUS DISEASE [COVID-19]), AMPLIFIED PROBE TECHNIQUE, MAKING USE OF HIGH THROUGHPUT TECHNOLOGIES AS DESCRIBED BY CMS-2020-01-R: HCPCS | Mod: 90 | Performed by: PATHOLOGY

## 2021-09-10 PROCEDURE — U0005 INFEC AGEN DETEC AMPLI PROBE: HCPCS | Mod: 90 | Performed by: PATHOLOGY

## 2021-09-14 ENCOUNTER — DOCUMENTATION ONLY (OUTPATIENT)
Dept: GASTROENTEROLOGY | Facility: OUTPATIENT CENTER | Age: 41
End: 2021-09-14
Payer: COMMERCIAL

## 2021-09-14 ENCOUNTER — TRANSFERRED RECORDS (OUTPATIENT)
Dept: HEALTH INFORMATION MANAGEMENT | Facility: CLINIC | Age: 41
End: 2021-09-14

## 2021-09-14 DIAGNOSIS — K21.9 GASTROESOPHAGEAL REFLUX DISEASE, UNSPECIFIED WHETHER ESOPHAGITIS PRESENT: ICD-10-CM

## 2021-11-05 ENCOUNTER — LAB (OUTPATIENT)
Dept: LAB | Facility: CLINIC | Age: 41
End: 2021-11-05
Payer: COMMERCIAL

## 2021-11-05 DIAGNOSIS — R53.83 OTHER FATIGUE: ICD-10-CM

## 2021-11-05 DIAGNOSIS — Z51.81 ENCOUNTER FOR THERAPEUTIC DRUG MONITORING: ICD-10-CM

## 2021-11-05 LAB
HIV 1+2 AB+HIV1 P24 AG SERPL QL IA: NONREACTIVE
T PALLIDUM AB SER QL: NONREACTIVE

## 2021-11-05 PROCEDURE — 84403 ASSAY OF TOTAL TESTOSTERONE: CPT

## 2021-11-05 PROCEDURE — 36415 COLL VENOUS BLD VENIPUNCTURE: CPT

## 2021-11-05 PROCEDURE — 87491 CHLMYD TRACH DNA AMP PROBE: CPT

## 2021-11-05 PROCEDURE — 87389 HIV-1 AG W/HIV-1&-2 AB AG IA: CPT

## 2021-11-05 PROCEDURE — 87591 N.GONORRHOEAE DNA AMP PROB: CPT

## 2021-11-05 PROCEDURE — 86780 TREPONEMA PALLIDUM: CPT

## 2021-11-06 LAB
C TRACH DNA SPEC QL NAA+PROBE: NEGATIVE
N GONORRHOEA DNA SPEC QL NAA+PROBE: NEGATIVE

## 2021-11-09 LAB — TESTOST SERPL-MCNC: 301 NG/DL (ref 240–950)

## 2021-11-19 ENCOUNTER — LAB (OUTPATIENT)
Dept: LAB | Facility: CLINIC | Age: 41
End: 2021-11-19
Payer: COMMERCIAL

## 2021-11-19 DIAGNOSIS — Z51.81 ENCOUNTER FOR THERAPEUTIC DRUG MONITORING: ICD-10-CM

## 2021-11-19 DIAGNOSIS — Z90.79 HISTORY OF ORCHIECTOMY: ICD-10-CM

## 2021-11-19 DIAGNOSIS — R53.83 OTHER FATIGUE: ICD-10-CM

## 2021-11-19 LAB
ALBUMIN SERPL-MCNC: 4 G/DL (ref 3.4–5)
ALP SERPL-CCNC: 95 U/L (ref 40–150)
ALT SERPL W P-5'-P-CCNC: 58 U/L (ref 0–70)
AST SERPL W P-5'-P-CCNC: 26 U/L (ref 0–45)
BILIRUB DIRECT SERPL-MCNC: 0.1 MG/DL (ref 0–0.2)
BILIRUB SERPL-MCNC: 0.7 MG/DL (ref 0.2–1.3)
HGB BLD-MCNC: 15.2 G/DL (ref 13.3–17.7)
PROT SERPL-MCNC: 7.9 G/DL (ref 6.8–8.8)
SHBG SERPL-SCNC: 24 NMOL/L (ref 11–80)
SHBG SERPL-SCNC: 24 NMOL/L (ref 11–80)

## 2021-11-19 PROCEDURE — 84270 ASSAY OF SEX HORMONE GLOBUL: CPT

## 2021-11-19 PROCEDURE — 84403 ASSAY OF TOTAL TESTOSTERONE: CPT

## 2021-11-19 PROCEDURE — 36415 COLL VENOUS BLD VENIPUNCTURE: CPT

## 2021-11-19 PROCEDURE — 85018 HEMOGLOBIN: CPT

## 2021-11-19 PROCEDURE — 80076 HEPATIC FUNCTION PANEL: CPT

## 2021-11-23 ENCOUNTER — MYC MEDICAL ADVICE (OUTPATIENT)
Dept: FAMILY MEDICINE | Facility: CLINIC | Age: 41
End: 2021-11-23
Payer: COMMERCIAL

## 2021-11-23 DIAGNOSIS — E29.1 HYPOGONADISM MALE: Primary | ICD-10-CM

## 2021-11-23 DIAGNOSIS — R03.0 ELEVATED BLOOD PRESSURE READING WITHOUT DIAGNOSIS OF HYPERTENSION: ICD-10-CM

## 2021-11-23 LAB
TESTOST FREE SERPL-MCNC: 6.82 NG/DL
TESTOST SERPL-MCNC: 292 NG/DL (ref 240–950)

## 2021-11-26 RX ORDER — TESTOSTERONE CYPIONATE 200 MG/ML
50 INJECTION, SOLUTION INTRAMUSCULAR WEEKLY
Qty: 4 ML | Refills: 2 | Status: SHIPPED | OUTPATIENT
Start: 2021-11-26 | End: 2021-11-26

## 2021-11-26 RX ORDER — TESTOSTERONE CYPIONATE 200 MG/ML
50 INJECTION, SOLUTION INTRAMUSCULAR WEEKLY
Qty: 4 ML | Refills: 2 | Status: SHIPPED | OUTPATIENT
Start: 2021-11-26 | End: 2021-11-29

## 2021-11-29 RX ORDER — TESTOSTERONE CYPIONATE 200 MG/ML
50 INJECTION, SOLUTION INTRAMUSCULAR WEEKLY
Qty: 4 ML | Refills: 2 | Status: SHIPPED | OUTPATIENT
Start: 2021-11-29 | End: 2021-12-03

## 2021-11-29 NOTE — TELEPHONE ENCOUNTER
Pt requests script sent to [facility]. I called [facility] to have them cancel previously sent prescriptions.

## 2021-12-03 RX ORDER — TESTOSTERONE CYPIONATE 200 MG/ML
50 INJECTION, SOLUTION INTRAMUSCULAR WEEKLY
Qty: 4 ML | Refills: 2 | Status: SHIPPED | OUTPATIENT
Start: 2021-12-03 | End: 2022-02-10

## 2021-12-15 ENCOUNTER — THERAPY VISIT (OUTPATIENT)
Dept: PHYSICAL THERAPY | Facility: CLINIC | Age: 41
End: 2021-12-15
Payer: COMMERCIAL

## 2021-12-15 DIAGNOSIS — R10.32 LEFT GROIN PAIN: ICD-10-CM

## 2021-12-15 PROCEDURE — 97110 THERAPEUTIC EXERCISES: CPT | Mod: GP | Performed by: PHYSICAL THERAPIST

## 2021-12-15 NOTE — PROGRESS NOTES
Subjective:  HPI  Physical Exam                    Objective:  System    Physical Exam    General     ROS    Assessment/Plan:    DISCHARGE REPORT    Progress reporting period is from 9/9/2021 to 12/15/2021.       SUBJECTIVE  Subjective changes noted by patient:  .  Subjective: Pain is 50% better.  Not lasting as long.  Stretching and massage help.      Current pain level is  Current Pain level: 0/10.     Previous pain level was   Initial Pain level: 1/10.   Changes in function:  Yes (See Goal flowsheet attached for changes in current functional level)  Adverse reaction to treatment or activity: None    OBJECTIVE  Changes noted in objective findings:  Yes,   Objective: Good control of diastasis with head lift and with adominial strengthening.  Progressed strengthening and mobility exercises.  Continue with massage as needed.     ASSESSMENT/PLAN  Updated problem list and treatment plan: Diagnosis 1:  Left groin pain  Pain -  manual therapy, self management, education and home program  Decreased ROM/flexibility - manual therapy, therapeutic exercise and home program  Decreased joint mobility - manual therapy, therapeutic exercise and home program  Decreased strength - therapeutic exercise, therapeutic activities and home program  Decreased function - therapeutic activities and home program  STG/LTGs have been met or progress has been made towards goals:  Yes (See Goal flow sheet completed today.)  Assessment of Progress: The patient's condition is improving.  The patient's condition has potential to improve.  Self Management Plans:  Patient has been instructed in a home treatment program.    Andrew continues to require the following intervention to meet STG and LTG's:  Patient needs to continue to work on the home exercise program.      Recommendations:  This patient is ready to be discharged from therapy and continue their home treatment program.    Please refer to the daily flowsheet for treatment today, total  treatment time and time spent performing 1:1 timed codes.

## 2021-12-20 ENCOUNTER — DOCUMENTATION ONLY (OUTPATIENT)
Dept: FAMILY MEDICINE | Facility: CLINIC | Age: 41
End: 2021-12-20
Payer: COMMERCIAL

## 2021-12-20 NOTE — PROGRESS NOTES
"When opening a documentation only encounter, be sure to enter in \"Chief Complaint\" Forms and in \" Comments\" Title of form, description if needed.    Andrew is a 41 year old  male  Form received via: Fax  Form now resides in: Provider Sheron Colby                  "

## 2021-12-21 ENCOUNTER — MEDICAL CORRESPONDENCE (OUTPATIENT)
Dept: HEALTH INFORMATION MANAGEMENT | Facility: CLINIC | Age: 41
End: 2021-12-21
Payer: COMMERCIAL

## 2021-12-21 NOTE — TELEPHONE ENCOUNTER
See MyChart messages. Patient meets criteria for HTN - multiple blood pressures >140/90. Interested in monitoring at home to confirm diagnosis/need for medication. Also at risk for worsening HTN with initiation of testosterone therapy. Will order home BP cuff.

## 2021-12-22 NOTE — PROGRESS NOTES
Form has been completed by provider.     Form sent out via: Fax to [employer] at Fax Number: [phone]  Patient informed: N/A  Output date: December 22, 2021    Joyce Glass      **Please close the encounter**

## 2022-01-12 ENCOUNTER — LAB REQUISITION (OUTPATIENT)
Dept: LAB | Facility: CLINIC | Age: 42
End: 2022-01-12

## 2022-01-12 LAB — HBV SURFACE AB SERPL IA-ACNC: 1.61 M[IU]/ML

## 2022-01-12 PROCEDURE — 86706 HEP B SURFACE ANTIBODY: CPT | Performed by: INTERNAL MEDICINE

## 2022-01-12 PROCEDURE — 86481 TB AG RESPONSE T-CELL SUSP: CPT | Performed by: INTERNAL MEDICINE

## 2022-01-13 LAB
QUANTIFERON MITOGEN: 10 IU/ML
QUANTIFERON NIL TUBE: 0.05 IU/ML
QUANTIFERON TB1 TUBE: 0.09 IU/ML
QUANTIFERON TB2 TUBE: 0.09

## 2022-01-14 LAB
GAMMA INTERFERON BACKGROUND BLD IA-ACNC: 0.05 IU/ML
M TB IFN-G BLD-IMP: NEGATIVE
M TB IFN-G CD4+ BCKGRND COR BLD-ACNC: 9.95 IU/ML
MITOGEN IGNF BCKGRD COR BLD-ACNC: 0.04 IU/ML
MITOGEN IGNF BCKGRD COR BLD-ACNC: 0.04 IU/ML

## 2022-02-09 ENCOUNTER — MYC MEDICAL ADVICE (OUTPATIENT)
Dept: FAMILY MEDICINE | Facility: CLINIC | Age: 42
End: 2022-02-09
Payer: COMMERCIAL

## 2022-02-09 DIAGNOSIS — F51.01 PRIMARY INSOMNIA: ICD-10-CM

## 2022-02-09 DIAGNOSIS — Z20.6 EXPOSURE TO HUMAN IMMUNODEFICIENCY VIRUS: ICD-10-CM

## 2022-02-09 DIAGNOSIS — G47.26 SHIFT WORK SLEEP DISORDER: ICD-10-CM

## 2022-02-09 DIAGNOSIS — E29.1 HYPOGONADISM MALE: ICD-10-CM

## 2022-02-10 RX ORDER — ZOLPIDEM TARTRATE 10 MG/1
10 TABLET ORAL
Qty: 30 TABLET | Refills: 0 | Status: SHIPPED | OUTPATIENT
Start: 2022-02-10 | End: 2022-02-10

## 2022-02-10 RX ORDER — EMTRICITABINE AND TENOFOVIR DISOPROXIL FUMARATE 200; 300 MG/1; MG/1
1 TABLET, FILM COATED ORAL DAILY
Qty: 90 TABLET | Refills: 0 | Status: SHIPPED | OUTPATIENT
Start: 2022-02-10 | End: 2022-02-10

## 2022-02-10 RX ORDER — TESTOSTERONE CYPIONATE 200 MG/ML
50 INJECTION, SOLUTION INTRAMUSCULAR WEEKLY
Qty: 4 ML | Refills: 2 | Status: SHIPPED | OUTPATIENT
Start: 2022-02-10 | End: 2022-03-04

## 2022-02-10 RX ORDER — ZOLPIDEM TARTRATE 10 MG/1
10 TABLET ORAL
Qty: 30 TABLET | Refills: 0 | Status: SHIPPED | OUTPATIENT
Start: 2022-02-10 | End: 2023-04-17

## 2022-02-10 RX ORDER — TESTOSTERONE CYPIONATE 200 MG/ML
50 INJECTION, SOLUTION INTRAMUSCULAR WEEKLY
Qty: 4 ML | Refills: 2 | Status: SHIPPED | OUTPATIENT
Start: 2022-02-10 | End: 2022-02-10

## 2022-02-10 RX ORDER — EMTRICITABINE AND TENOFOVIR DISOPROXIL FUMARATE 200; 300 MG/1; MG/1
1 TABLET, FILM COATED ORAL DAILY
Qty: 90 TABLET | Refills: 0 | Status: SHIPPED | OUTPATIENT
Start: 2022-02-10 | End: 2022-05-05

## 2022-02-14 ENCOUNTER — LAB (OUTPATIENT)
Dept: LAB | Facility: CLINIC | Age: 42
End: 2022-02-14
Payer: COMMERCIAL

## 2022-02-14 DIAGNOSIS — Z20.6 EXPOSURE TO HUMAN IMMUNODEFICIENCY VIRUS: ICD-10-CM

## 2022-02-14 DIAGNOSIS — E29.1 HYPOGONADISM MALE: ICD-10-CM

## 2022-02-14 LAB
ALBUMIN SERPL-MCNC: 4.4 G/DL (ref 3.4–5)
ALP SERPL-CCNC: 81 U/L (ref 40–150)
ALT SERPL W P-5'-P-CCNC: 52 U/L (ref 0–70)
ANION GAP SERPL CALCULATED.3IONS-SCNC: 8 MMOL/L (ref 3–14)
AST SERPL W P-5'-P-CCNC: 29 U/L (ref 0–45)
BILIRUB SERPL-MCNC: 0.6 MG/DL (ref 0.2–1.3)
BUN SERPL-MCNC: 20 MG/DL (ref 7–30)
CALCIUM SERPL-MCNC: 9.3 MG/DL (ref 8.5–10.1)
CHLORIDE BLD-SCNC: 102 MMOL/L (ref 94–109)
CHOLEST SERPL-MCNC: 156 MG/DL
CO2 SERPL-SCNC: 27 MMOL/L (ref 20–32)
CREAT SERPL-MCNC: 1.16 MG/DL (ref 0.66–1.25)
FASTING STATUS PATIENT QL REPORTED: ABNORMAL
GFR SERPL CREATININE-BSD FRML MDRD: 81 ML/MIN/1.73M2
GLUCOSE BLD-MCNC: 103 MG/DL (ref 70–99)
HDLC SERPL-MCNC: 29 MG/DL
HGB BLD-MCNC: 15.5 G/DL (ref 13.3–17.7)
LDLC SERPL CALC-MCNC: 70 MG/DL
NONHDLC SERPL-MCNC: 127 MG/DL
POTASSIUM BLD-SCNC: 3.8 MMOL/L (ref 3.4–5.3)
PROT SERPL-MCNC: 7.9 G/DL (ref 6.8–8.8)
SODIUM SERPL-SCNC: 137 MMOL/L (ref 133–144)
TRIGL SERPL-MCNC: 284 MG/DL

## 2022-02-14 PROCEDURE — 84403 ASSAY OF TOTAL TESTOSTERONE: CPT

## 2022-02-14 PROCEDURE — 87389 HIV-1 AG W/HIV-1&-2 AB AG IA: CPT

## 2022-02-14 PROCEDURE — 80061 LIPID PANEL: CPT

## 2022-02-14 PROCEDURE — 36415 COLL VENOUS BLD VENIPUNCTURE: CPT

## 2022-02-14 PROCEDURE — 80053 COMPREHEN METABOLIC PANEL: CPT

## 2022-02-14 PROCEDURE — 85018 HEMOGLOBIN: CPT

## 2022-02-15 ENCOUNTER — ALLIED HEALTH/NURSE VISIT (OUTPATIENT)
Dept: FAMILY MEDICINE | Facility: CLINIC | Age: 42
End: 2022-02-15
Payer: COMMERCIAL

## 2022-02-15 DIAGNOSIS — Z23 ENCOUNTER FOR IMMUNIZATION: Primary | ICD-10-CM

## 2022-02-15 LAB — HIV 1+2 AB+HIV1 P24 AG SERPL QL IA: NONREACTIVE

## 2022-02-15 PROCEDURE — 90471 IMMUNIZATION ADMIN: CPT

## 2022-02-15 PROCEDURE — 90651 9VHPV VACCINE 2/3 DOSE IM: CPT

## 2022-02-15 PROCEDURE — 99207 PR NO CHARGE NURSE ONLY: CPT

## 2022-02-16 LAB — TESTOST SERPL-MCNC: 644 NG/DL (ref 240–950)

## 2022-03-03 ENCOUNTER — MYC MEDICAL ADVICE (OUTPATIENT)
Dept: FAMILY MEDICINE | Facility: CLINIC | Age: 42
End: 2022-03-03
Payer: COMMERCIAL

## 2022-03-03 DIAGNOSIS — E29.1 HYPOGONADISM MALE: ICD-10-CM

## 2022-03-03 DIAGNOSIS — E78.2 MIXED HYPERLIPIDEMIA: ICD-10-CM

## 2022-03-03 NOTE — TELEPHONE ENCOUNTER
"Request for medication refill: NEEDLE 23G X 1-1/2\" (40MMD)BD   needle, disp, 18G X 1\" MISC  - PLEASE ADVISE.     Providers if patient needs an appointment and you are willing to give a one month supply please refill for one month and  send a letter/MyChart using \".SMILLIMITEDREFILL\" .smillimited and route chart to \"P SMI \" (Giving one month refill in non controlled medications is strongly recommended before denial)    If refill has been denied, meaning absolutely no refills without visit, please complete the smart phrase \".smirxrefuse\" and route it to the \"P SMI MED REFILLS\"  pool to inform the patient and the pharmacy.    Regi Colby        "

## 2022-03-04 RX ORDER — TESTOSTERONE CYPIONATE 200 MG/ML
50 INJECTION, SOLUTION INTRAMUSCULAR WEEKLY
Qty: 4 ML | Refills: 5 | Status: SHIPPED | OUTPATIENT
Start: 2022-03-04 | End: 2022-08-22

## 2022-03-04 RX ORDER — ATORVASTATIN CALCIUM 20 MG/1
20 TABLET, FILM COATED ORAL DAILY
Qty: 90 TABLET | Refills: 3 | Status: SHIPPED | OUTPATIENT
Start: 2022-03-04 | End: 2023-01-03

## 2022-05-26 ENCOUNTER — OFFICE VISIT (OUTPATIENT)
Dept: FAMILY MEDICINE | Facility: CLINIC | Age: 42
End: 2022-05-26
Payer: COMMERCIAL

## 2022-05-26 VITALS
BODY MASS INDEX: 38.59 KG/M2 | WEIGHT: 288 LBS | RESPIRATION RATE: 16 BRPM | HEART RATE: 76 BPM | DIASTOLIC BLOOD PRESSURE: 85 MMHG | TEMPERATURE: 97.1 F | SYSTOLIC BLOOD PRESSURE: 142 MMHG | OXYGEN SATURATION: 97 %

## 2022-05-26 DIAGNOSIS — I10 BENIGN ESSENTIAL HYPERTENSION: ICD-10-CM

## 2022-05-26 DIAGNOSIS — Z23 ENCOUNTER FOR IMMUNIZATION: ICD-10-CM

## 2022-05-26 DIAGNOSIS — Z87.891 PERSONAL HISTORY OF TOBACCO USE, PRESENTING HAZARDS TO HEALTH: ICD-10-CM

## 2022-05-26 DIAGNOSIS — Z20.6 EXPOSURE TO HUMAN IMMUNODEFICIENCY VIRUS: Primary | ICD-10-CM

## 2022-05-26 DIAGNOSIS — M77.51 BONE SPUR OF TOE OF RIGHT FOOT: ICD-10-CM

## 2022-05-26 DIAGNOSIS — E29.1 HYPOGONADISM MALE: ICD-10-CM

## 2022-05-26 LAB — HIV 1+2 AB+HIV1 P24 AG SERPL QL IA: NONREACTIVE

## 2022-05-26 PROCEDURE — 36415 COLL VENOUS BLD VENIPUNCTURE: CPT | Performed by: STUDENT IN AN ORGANIZED HEALTH CARE EDUCATION/TRAINING PROGRAM

## 2022-05-26 PROCEDURE — 90471 IMMUNIZATION ADMIN: CPT | Performed by: STUDENT IN AN ORGANIZED HEALTH CARE EDUCATION/TRAINING PROGRAM

## 2022-05-26 PROCEDURE — 87389 HIV-1 AG W/HIV-1&-2 AB AG IA: CPT | Performed by: STUDENT IN AN ORGANIZED HEALTH CARE EDUCATION/TRAINING PROGRAM

## 2022-05-26 PROCEDURE — 99214 OFFICE O/P EST MOD 30 MIN: CPT | Mod: 25 | Performed by: STUDENT IN AN ORGANIZED HEALTH CARE EDUCATION/TRAINING PROGRAM

## 2022-05-26 PROCEDURE — 90715 TDAP VACCINE 7 YRS/> IM: CPT | Performed by: STUDENT IN AN ORGANIZED HEALTH CARE EDUCATION/TRAINING PROGRAM

## 2022-05-26 RX ORDER — MULTIVITAMIN,THERAPEUTIC
1 TABLET ORAL DAILY
COMMUNITY

## 2022-05-26 RX ORDER — PNV NO.95/FERROUS FUM/FOLIC AC 28MG-0.8MG
TABLET ORAL
COMMUNITY
Start: 2021-07-01

## 2022-05-26 NOTE — PROGRESS NOTES
"  Assessment & Plan     Exposure to human immunodeficiency virus  On PrEP, update q3mo HIV labs, no other STI screening needed.   - HIV Antigen Antibody Combo  - HIV Antigen Antibody Combo    Benign essential hypertension  Above goal <140/90. Notes increased BP with recent weight gain. Working on lifestyle modifications. Will recheck at work in coming months -- discussed med txt options if remains above goal.     Encounter for immunization  - TDAP VACCINE (Adacel, Boostrix)  [[id number]]    Bone spur of toe of right foot  Bony growth on medial aspect of right great toe DIP - only hurts w/ hockey skates. Will monitor, see podiatry if worsening.     History tobacco use   Quit x6 mos! Congratulated. Discussed phlgmy cough will likely improve w/ time and exercise - if not, consider trial albuterol inhaler. Can consider LDCT screening starting at age 50 if desired.     Hypogonadism  Feeling great on testosterone. Considering switch to subcutaneous injections -- will let me know if he needs new needles sent.     Ordering of each unique test  Prescription drug management  27 minutes spent on the date of the encounter doing chart review, history and exam, documentation and further activities per the note     BMI:   Estimated body mass index is 38.59 kg/m  as calculated from the following:    Height as of 8/26/21: 1.84 m (6' 0.44\").    Weight as of this encounter: 130.6 kg (288 lb).   Weight management plan: Discussed healthy diet and exercise guidelines      Return in about 6 months (around 11/26/2022) for Follow up, with me. Will plan on HIV q3mos inbetween visits.     Mitra Payton DO  [facility] KAYLA Morales is a 41 year old who presents for the following health issues     Chief Complaint   Patient presents with     RECHECK     Follow up medications with lab draws per patient      HPI     Put on some weight - working on it. Doesn't need any additional resources.     Libido, facial hair " growth, energy all improved since testosterone. Subcutaneous? 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     No cigarettes x6mos now. Still an occasional phegmy cough. Wondering about long term sequelae, lung healing.     Review of Systems   Pertinent positives and negatives per HPI.        Objective    BP (!) 142/85   Pulse 76   Temp 97.1  F (36.2  C) (Tympanic)   Resp 16   Wt 130.6 kg (288 lb)   SpO2 97%   BMI 38.59 kg/m    Body mass index is 38.59 kg/m .  Physical Exam   GENERAL: healthy, alert and no distress  NECK: no adenopathy, no asymmetry, masses, or scars and thyroid normal to palpation  RESP: lungs clear to auscultation - no rales, rhonchi or wheezes  CV: regular rate and rhythm, normal S1 S2, no S3 or S4, no murmur, click or rub   MS: no gross musculoskeletal defects noted, no edema. Bony protrusion at PIP of R great toe   NEURO: Normal strength and tone, mentation intact and speech normal

## 2022-05-27 ENCOUNTER — MYC MEDICAL ADVICE (OUTPATIENT)
Dept: FAMILY MEDICINE | Facility: CLINIC | Age: 42
End: 2022-05-27
Payer: COMMERCIAL

## 2022-05-27 DIAGNOSIS — R06.09 EXERTIONAL DYSPNEA: Primary | ICD-10-CM

## 2022-05-31 RX ORDER — ALBUTEROL SULFATE 90 UG/1
2 AEROSOL, METERED RESPIRATORY (INHALATION) EVERY 6 HOURS
Qty: 18 G | Refills: 0 | Status: SHIPPED | OUTPATIENT
Start: 2022-05-31 | End: 2022-06-19

## 2022-06-09 ENCOUNTER — TELEPHONE (OUTPATIENT)
Dept: FAMILY MEDICINE | Facility: CLINIC | Age: 42
End: 2022-06-09
Payer: COMMERCIAL

## 2022-06-09 NOTE — TELEPHONE ENCOUNTER
RN reached out to ish Chowdhury who advised that patient had received albuterol prescription on 6/2.     RN attempted to reach out to clarify request, left VM with clinic callback number. Will send [employer] message.     Zoe Hernandez RN

## 2022-06-09 NOTE — TELEPHONE ENCOUNTER
Rn spoke with patient. Already received inhaler, unsure why pharmacy reached out to [facility]. No further questions at this time.     Zoe Hernandez RN

## 2022-06-09 NOTE — TELEPHONE ENCOUNTER
Verify that the refill encounter hasn't been started Yes    [facility] phone call message- patient requesting a refill:    Full Medication Name:   albuterol (PROAIR HFA/PROVENTIL HFA/VENTOLIN HFA) 108 (90 Base) MCG/ACT inhaler 18 g         Dose: Sig - Route: Inhale 2 puffs into the lungs every 6 hours - Inhalation     Pharmacy confirmed as     [id number] - [place], [place] - [address]  [address] [address]  Phone: [phone] Fax: [phone]  : Yes    Medication tab checked to see if medication has been sent  Yes    Additional Comments: Pharmacy called to follow up.     OK to leave a message on voice mail? Yes    Advised patient refill may take up to 2 business days? Yes    Primary language: English      needed? No    Call taken on June 9, 2022 at 12:53 PM by Kathia Slater to P SMI MED REFILL

## 2022-06-09 NOTE — TELEPHONE ENCOUNTER
Patient called back and is wanting to speak to a nurse. Please call him back at your next availability.

## 2022-06-17 DIAGNOSIS — R06.09 EXERTIONAL DYSPNEA: ICD-10-CM

## 2022-06-17 NOTE — TELEPHONE ENCOUNTER
"Request for medication refill:  albuterol (PROAIR HFA/PROVENTIL HFA/VENTOLIN HFA) 108 (90 Base) MCG/ACT inhaler  Providers if patient needs an appointment and you are willing to give a one month supply please refill for one month and  send a letter/MyChart using \".SMILLIMITEDREFILL\" .smillimited and route chart to \"P [facility] \" (Giving one month refill in non controlled medications is strongly recommended before denial)    If refill has been denied, meaning absolutely no refills without visit, please complete the smart phrase \".smirxrefuse\" and route it to the \"P SMI MED REFILLS\"  pool to inform the patient and the pharmacy.    Joyce Glass        "

## 2022-06-19 RX ORDER — ALBUTEROL SULFATE 90 UG/1
2 AEROSOL, METERED RESPIRATORY (INHALATION) EVERY 6 HOURS
Qty: 18 G | Refills: 0 | Status: SHIPPED | OUTPATIENT
Start: 2022-06-19 | End: 2022-08-22

## 2022-07-20 DIAGNOSIS — Z20.6 EXPOSURE TO HUMAN IMMUNODEFICIENCY VIRUS: ICD-10-CM

## 2022-07-20 NOTE — TELEPHONE ENCOUNTER
[facility] phone call message- patient requesting a refill:    Full Medication Name: emtricitabine-tenofovir (TRUVADA) 200-300 MG per tablet    Dose: Take 1 tablet by mouth daily - Oral    Pharmacy confirmed as     [id number] - [place], [place] - [address]  [address] [address]  Phone: [phone] Fax: [phone]  : Yes    Additional Comments: Refill of medication requested    OK to leave a message on voice mail? Yes    Primary language: English      needed? No    Call taken on July 20, 2022 at 4:05 PM by Ale Rodriguez

## 2022-07-21 RX ORDER — EMTRICITABINE AND TENOFOVIR DISOPROXIL FUMARATE 200; 300 MG/1; MG/1
1 TABLET, FILM COATED ORAL DAILY
Qty: 90 TABLET | Refills: 0 | Status: SHIPPED | OUTPATIENT
Start: 2022-07-21 | End: 2022-10-12

## 2022-07-21 NOTE — TELEPHONE ENCOUNTER
"Request for medication refill:    emtricitabine-tenofovir (TRUVADA) 200-300 MG per tablet   last OV: 5/26/22    Providers if patient needs an appointment and you are willing to give a one month supply please refill for one month and  send a letter/MyChart using \".SMILLIMITEDREFILL\" .smillimited and route chart to \"P SMI \" (Giving one month refill in non controlled medications is strongly recommended before denial)    If refill has been denied, meaning absolutely no refills without visit, please complete the smart phrase \".smirxrefuse\" and route it to the \"P SMI MED REFILLS\"  pool to inform the patient and the pharmacy.    Jomar Robertson MA        "

## 2022-08-08 ENCOUNTER — LAB (OUTPATIENT)
Dept: LAB | Facility: CLINIC | Age: 42
End: 2022-08-08
Payer: COMMERCIAL

## 2022-08-08 DIAGNOSIS — Z20.6 EXPOSURE TO HUMAN IMMUNODEFICIENCY VIRUS: ICD-10-CM

## 2022-08-08 PROCEDURE — 36415 COLL VENOUS BLD VENIPUNCTURE: CPT

## 2022-08-08 PROCEDURE — 87389 HIV-1 AG W/HIV-1&-2 AB AG IA: CPT

## 2022-08-09 LAB — HIV 1+2 AB+HIV1 P24 AG SERPL QL IA: NONREACTIVE

## 2022-08-22 DIAGNOSIS — E29.1 HYPOGONADISM MALE: ICD-10-CM

## 2022-08-22 DIAGNOSIS — R06.09 EXERTIONAL DYSPNEA: ICD-10-CM

## 2022-08-22 DIAGNOSIS — L73.9 FOLLICULITIS: ICD-10-CM

## 2022-08-22 RX ORDER — TESTOSTERONE CYPIONATE 200 MG/ML
50 INJECTION, SOLUTION INTRAMUSCULAR WEEKLY
Qty: 4 ML | Refills: 5 | Status: SHIPPED | OUTPATIENT
Start: 2022-08-22 | End: 2023-03-07

## 2022-08-22 RX ORDER — ALBUTEROL SULFATE 90 UG/1
2 AEROSOL, METERED RESPIRATORY (INHALATION) EVERY 6 HOURS
Qty: 18 G | Refills: 3 | Status: SHIPPED | OUTPATIENT
Start: 2022-08-22 | End: 2023-01-17

## 2022-08-22 RX ORDER — MUPIROCIN 20 MG/G
OINTMENT TOPICAL 3 TIMES DAILY
Qty: 15 G | Refills: 0 | Status: SHIPPED | OUTPATIENT
Start: 2022-08-22 | End: 2023-04-17

## 2022-08-22 NOTE — TELEPHONE ENCOUNTER
[facility] phone call message- patient requesting a refill:    Full Medication Name: mupirocin (BACTROBAN) 2 % external ointment    albuterol (PROAIR HFA/PROVENTIL HFA/VENTOLIN HFA) 108 (90 Base) MCG/ACT inhaler    testosterone cypionate (DEPOTESTOSTERONE) 200 MG/ML injection    Dose: See chart    Pharmacy confirmed as   [facility] - [place], [place] - [address]  [address]  [facility] [address]  Phone: [phone] Fax: [phone]  : Yes    Additional Comments: Kassi called from [facility] pharmacy requesting refill of three medications.      OK to leave a message on voice mail? Yes    Primary language: English      needed? No    Call taken on August 22, 2022 at 10:06 AM by Ale Rodriguez

## 2022-08-31 DIAGNOSIS — G47.33 OBSTRUCTIVE SLEEP APNEA (ADULT) (PEDIATRIC): Primary | ICD-10-CM

## 2022-09-11 ENCOUNTER — HEALTH MAINTENANCE LETTER (OUTPATIENT)
Age: 42
End: 2022-09-11

## 2022-10-06 DIAGNOSIS — Z20.6 EXPOSURE TO HUMAN IMMUNODEFICIENCY VIRUS: ICD-10-CM

## 2022-10-06 NOTE — TELEPHONE ENCOUNTER
[facility] phone call message- patient requesting a refill:    Full Medication Name: emtricitabine-tenofovir (TRUVADA) 200-300 MG per tablet    Dose: Take 1 tablet by mouth daily - Oral    Pharmacy confirmed as     [facility] - [place], [place] - [address]  [address]  [facility] [address]  Phone: [phone] Fax: [phone]  : Yes    Additional Comments: Pharmacy called to call for a refill of medication.    OK to leave a message on voice mail? Yes    Primary language: English      needed? No    Call taken on October 6, 2022 at 4:40 PM by Ale Rodriguez

## 2022-10-11 NOTE — TELEPHONE ENCOUNTER

## 2022-10-12 RX ORDER — EMTRICITABINE AND TENOFOVIR DISOPROXIL FUMARATE 200; 300 MG/1; MG/1
1 TABLET, FILM COATED ORAL DAILY
Qty: 90 TABLET | Refills: 0 | Status: SHIPPED | OUTPATIENT
Start: 2022-10-12 | End: 2023-01-03

## 2022-11-11 ENCOUNTER — TELEPHONE (OUTPATIENT)
Dept: FAMILY MEDICINE | Facility: CLINIC | Age: 42
End: 2022-11-11

## 2022-11-11 ENCOUNTER — OFFICE VISIT (OUTPATIENT)
Dept: FAMILY MEDICINE | Facility: CLINIC | Age: 42
End: 2022-11-11
Payer: COMMERCIAL

## 2022-11-11 VITALS
SYSTOLIC BLOOD PRESSURE: 134 MMHG | DIASTOLIC BLOOD PRESSURE: 90 MMHG | BODY MASS INDEX: 38.21 KG/M2 | TEMPERATURE: 97.9 F | HEART RATE: 77 BPM | RESPIRATION RATE: 16 BRPM | WEIGHT: 285.2 LBS | OXYGEN SATURATION: 96 %

## 2022-11-11 DIAGNOSIS — Z20.6 EXPOSURE TO HUMAN IMMUNODEFICIENCY VIRUS: Primary | ICD-10-CM

## 2022-11-11 DIAGNOSIS — F90.9 ATTENTION DEFICIT HYPERACTIVITY DISORDER (ADHD), UNSPECIFIED ADHD TYPE: ICD-10-CM

## 2022-11-11 DIAGNOSIS — I10 BENIGN ESSENTIAL HYPERTENSION: ICD-10-CM

## 2022-11-11 DIAGNOSIS — L73.9 FOLLICULITIS: ICD-10-CM

## 2022-11-11 DIAGNOSIS — R79.89 LOW TESTOSTERONE: ICD-10-CM

## 2022-11-11 PROBLEM — E66.812 CLASS 2 OBESITY IN ADULT: Status: ACTIVE | Noted: 2021-08-26

## 2022-11-11 LAB — TSH SERPL DL<=0.005 MIU/L-ACNC: 1.9 UIU/ML (ref 0.3–4.2)

## 2022-11-11 PROCEDURE — 87389 HIV-1 AG W/HIV-1&-2 AB AG IA: CPT | Performed by: STUDENT IN AN ORGANIZED HEALTH CARE EDUCATION/TRAINING PROGRAM

## 2022-11-11 PROCEDURE — 36415 COLL VENOUS BLD VENIPUNCTURE: CPT | Performed by: STUDENT IN AN ORGANIZED HEALTH CARE EDUCATION/TRAINING PROGRAM

## 2022-11-11 PROCEDURE — 84270 ASSAY OF SEX HORMONE GLOBUL: CPT | Performed by: STUDENT IN AN ORGANIZED HEALTH CARE EDUCATION/TRAINING PROGRAM

## 2022-11-11 PROCEDURE — 84403 ASSAY OF TOTAL TESTOSTERONE: CPT | Performed by: STUDENT IN AN ORGANIZED HEALTH CARE EDUCATION/TRAINING PROGRAM

## 2022-11-11 PROCEDURE — 84443 ASSAY THYROID STIM HORMONE: CPT | Performed by: STUDENT IN AN ORGANIZED HEALTH CARE EDUCATION/TRAINING PROGRAM

## 2022-11-11 PROCEDURE — 99214 OFFICE O/P EST MOD 30 MIN: CPT | Performed by: STUDENT IN AN ORGANIZED HEALTH CARE EDUCATION/TRAINING PROGRAM

## 2022-11-11 RX ORDER — CLINDAMYCIN PHOSPHATE 10 MG/G
GEL TOPICAL 2 TIMES DAILY
Qty: 60 G | Refills: 0 | Status: SHIPPED | OUTPATIENT
Start: 2022-11-11 | End: 2023-03-24

## 2022-11-11 NOTE — PROGRESS NOTES
Assessment & Plan     PReP therapy  Good compliance with PReP, no other STI screening needed at this time. Monogamous w/ HIV+ partner w/ undetectable viral load. Requests q6mo labs -- given very low risk of transmission, reasonable compromise.   - HIV Antigen Antibody Combo  - HIV labs q6mos     BMI 38  Trying to get his weight down to 250lbs. Currently he weighs around 280lbs. Discussed healthy eating, exercise, and working with his nutritionist. Also discussed making sure his eating habits don't become too harmful in the setting of keeping track of his calories. He is confident in his ability to recognize these behaviors.   - TSH with free T4 reflex    Low testosterone  Injections seems to be going well. Currently no concerns. Will be checking testosterone levels today in the setting of his difficult weight loss.   - Testosterone Free and Total    Attention deficit hyperactivity disorder (ADHD), unspecified ADHD type  Was diagnosed with ADHD as a kid. He was never put on any medications and primarily managed it through therapy. He is not interested in stimulants at this time but we discussed non-stimulant options including atomoxetine and bupropion. He will look into these on his own. We will also refer for CBT in our system but encouraged him to reach out to other mental health clinics to see if they can work with him as well.   - Adult Mental Health  Referral    Folliculitis  Mupirocin cream seemed to help his folliculitis but it has since returned since stopping. Unclear etiology -- small pustules without crusting or flaking. Discussed OTC treatment options such as Neutrogena T Gel Shampoo. Will also be starting clindamycin ointment. His chart flagged clindamycin as an allergy, but this gel should not be absorbed systemically so it should not cause the allergic reaction he had in the past (diahhrea/abominal discomfort). We discussed stopping the gel if he does develop any reaction. He was agreeable  to proceed as above.   - coal tar shampoo twice weekly   - clindamycin (CLINDAMAX) 1% external gel  - consider topical retinoid if not improving     HTN  Has been controlled w/ home monitoring w/ diet and exercise alone.     HCM: reports UTD with covid bivalent       Patient seen and discussed with Dr. Mitra Gomezsh Frankykenn, MS3      Ordering of each unique test  Prescription drug management  22 minutes spent on the date of the encounter doing chart review, history and exam, documentation and further activities per the note     FUTURE APPOINTMENTS:       - Follow-up visit in 6 months or sooner if issues arise    I was present with the medical student who participated in the service and in the documentation of this note. I have verified the history and personally performed the physical exam and medical decision making, and have verified the content of the note, which accurately reflects my assessment of the patient and the plan of care.   Mitra Payton DO    [facility] KAYLA Morales is a 41 year old presenting for the following health issues:  Follow Up (Follow up meds and weight loss/Talk about ADD (hard to concentrate )    HPI     Weight loss - currently trying to lose weight. His goal is 250lbs. He was attending a crossfit gym but the gym closed down two weeks ago. He is working with his friend who is a nutritionist. His friend gave him a goal of 2800 cals/day. Andrew is tracking his food by measuring or weighing. He is frustrated that it has not been working yet. He does not endorse too much fatigue and feels he has good energy.     ADD - he was diagnosed with ADD in middle school. He was never on any medications with it but managed it using cognitive skills. His symptoms primarily are present at work but does endorse some symptoms at home as well. He is not interested in stimulant medications at this time but will think about non-stimulant medications. He was also  interested in connecting with another therapist to help out his symptoms.    PrEP - reports good compliance and has extremely low concerns. Wondering about switching testing to every 6 months instead of 3. No other STI concerns at this time.     Testosterone - injections are going well. No current concerns or side effects.     Rash on back of his head - was well controlled with mupirocin but has since returned. Wondering about other treatment options to manage it .    Review of Systems   Pertinent positives and negatives per HPI.        Objective    BP (!) 134/90 (BP Location: Left arm, Patient Position: Sitting, Cuff Size: Adult Large)   Pulse 77   Temp 97.9  F (36.6  C) (Oral)   Resp 16   Wt 129.4 kg (285 lb 3.2 oz)   SpO2 96%   BMI 38.21 kg/m    Body mass index is 38.21 kg/m .  Physical Exam   GENERAL: healthy, alert and no distress  RESP: lungs clear to auscultation - no rales, rhonchi or wheezes  CV: regular rate and rhythm, normal S1 S2, no S3 or S4, no murmur, click or rub, no peripheral edema and peripheral pulses strong  MS: no gross musculoskeletal defects noted, no edema  SKIN: scalp shows small papules and pustules to posterior scalp and nape of neck. Hair is closely shaved. No large pustules, cysts, or drainage. No erythema. No crusting or flaking.       ----- Services Performed by a MEDICAL STUDENT in Presence of ATTENDING Physician-------

## 2022-11-11 NOTE — PATIENT INSTRUCTIONS
Two non-stimulant med options for ADD:   - Strattera  - Wellbutrin (bupropion)     [employer]     For the scalp:   Neutrogena T Gel Shampoo - anything with coal tar. Let it sit for at least 2-3 minutes before rinsing.   - clinda gel topically instead of mupirocin  - if not improving let me know - can do Derm referral and/or other topicals like a retinoid     ++++++++++++++++++++++++++++++++++++++++++++++  ADHD therapy/resource list:   [facility] (Multiple Locations)   [phone]      Garcia Campos  [email or web address]  Individual and group therapy for ADHD      [facility] ([facility])  [phone]  [email or web address].  [address] [place] [address], [address]  [place]  [address]  Services:  LD, ADHD, learning and memory, and ASD assessments and therapy.       [facility]  ([place] or [place])  [email or web address]  Assessment or ADHD CBT therapy    Assessment  [facility] Adult ADHD eval (if primary is in the system)   Davin Escalante at [facility] (have the patient call call [phone]).[facility] (multiple locations).     Succeeding with ADHD  Radha Ayala  [email or web address]  Children and adults, therapy options  Also runs [facility] [place] chapter    [email or web address]  Therapy and coaching  Children and adults       [facility]  [address] [place] [address]   [address]  [place], [place] [address]   Call Deann Minor  [phone]   ADHD AND TRAUMA    Cely Duggan   Art Therapist,  DOC Todd,  LMFT,  ATR  [address] [place] [address]   [address]  [place], [place] [address] [phone]     Matthias Edward   Clinical Social Work/Therapist,  PsSai,  [facility]  [facility], [facility]  [address]   [address]  [place], [place] [address] [phone]   WORKS WITH THOSE  WITH YOUNG ADULTS HAVING ' TROUBLE LAUNCHING'       [facility], [facility]  [address]  [place], [place] [address]   [phone]   TESTING AND THERAPY    [facility] --Cogmed, individual psychological support and therapy to biofeedback, EMDR (Eye Movement Desensitization and Reprocessing), and narrative therapies. To make an appointment, call [phone]    Ana Merida PsyD.,   [phone]  [email or web address]  [facility]  [address], [address]  [place] [address]  Services: LD and ADHD assessments, counseling, mental health diagnoses and ADHD coaching.    Solitario Agosto, Ph.D.  [phone]  [address], [address]  [place] [address]  Services:  LD (includes second language learning), AD/HD and ASD assessments, and individual psychotherapy.    Trenton Avalos Psy.D., L.P.  [phone]  [facility]  [address]  [place] [address]   Services:  ASD Assessment and counseling.        Lucila Dunbar Psy.D., L.P.   [phone] / [phone]  [address]  [place]  [address]  OR  [phone]  [facility], [address]  [place] [address]   Services:  LD (including second language learning) and ADHD assessments and counseling.     [facility]  [phone]  Bee Bertrand, Ph.D, L.P.; Pat Guerrier, PhD, L.P.;   Destinee Weller PsyD.  [address]  [place]  [address]  Services:  LD, ADHD and ASD assessments, and cognitive retraining for memory impairments and attention deficits.    ADHD ADULT SUPPORT GROUPS  [facility]  [address] [place], [place] [address]    Phone: [phone]  Fax: [phone]  Email: [email or web address]    PSYCHOLOGY TODAY THERAPIST FINDER    ------    COACHING ONLY    [facility]  [email or web address]  ADHD and Executive Coaching ONLY    COACHING ONLY   [facility]  [place], [place] [address]   [phone]     ------------    [facility]  [address], [address]  [place] [address]  Phone: [phone]  Services: LD and ADHD assessments, mental health recommendations and diagnosis, and ADHD coaching)    [facility]  [address] [place] [address], [address]  [place] [address]  Office: [phone]  Fax: [phone]  Services: LD & ADHD assessments, vocational counseling,     -[facility]  Phone: [phone]  Services: LD & ADHD assessments for ages 6+    --------------------------------------------------------------------------------------------------------  If patient has [facility] PCP, he/she can call [facility] and schedule with their adult ADHD assessment team.    If not, Davin Escalante at [facility] can do assessment and refer back to us.  PT should call him at [phone] to schedule.     Assessment for Learning Disabilities (LD), Attendtion Deficit/Hyperactivity Disorder (ADHD), and Autism Spectrum Disorders (ASD)   *Inclusion on this list does not constitute endorsement from the [facility] or the [facility]     Kane Bruno, PhD, LP   [phone]   [facility]   [address] [place] [address]   Services: ADHD assessments     [facility] GaryS.E., L.P.   [phone]   [facility]   [address] [address]   Services: LD and AD/HD assessments     [facility]   [phone]   Various practitioners   [address] [place] [address]   Services: LD assessments     Kevin Castillo PsyD   [phone]   [address] [place] [address], [address]   [place] [address] (on the bus line)   Services: LD, ADHD and ASD assessments for adults       * Provides  differential diagnosis ADHD evaluation for the purpose of accessing medication for students whose health insurance will not cover ADHD evaluations. The evaluation consists of a diagnostic assessment, computer continuous performance test, MMPI-2, and WAIS-IV for $750.00. This evaluation does not include academic achievement or information processing measures. Students interested in this type of assessment should call [phone] and mention that they are [facility] students.     [facility]   [phone]   [email or web address]   [address] [place] [address]   Services:  LD, and ADHD assessments     Batsheva Weber MA   [phone]   [facility]   [facility]   [address] [place] [address]   Services: LD assessments.   $600-$850 depending on which assessments a student requires.     [facility]   [phone]   [email or web address]   [facility]   [place], [place]   Services:  LD and ADHD assessments.     Matthias Delgadillo, Ph.D.   [phone]   [facility]   [address]   [place] [address]   OR   [address] [place] [address]   Services:  LD and ADHD assessments.     Nii Hernandez, Ph.D., L.P.   [phone] (ext. [phone])   [facility]   [address] [place] [address]   Services:  LD (including second language learning) and ADHD assessments.     Julito Posadas, Ph.D., L.P.   [phone]   [email or web address]   [email or web address]   Private Practice   Services: LD and ADHD assessments     Nany Mondragon M.A., L.P.   [phone]   [facility]   [address]   [place] [address]   Services: LD and ADHD assessments

## 2022-11-11 NOTE — TELEPHONE ENCOUNTER
[facility] phone call message- medication clarification/question:    Full Medication Name: clindamycin (CLINDAMAX) 1 % external gel       Dose: Apply topically 2 times daily - Topical    Question: Pharmacy wanted to check on this medicaiton because it is listed that the patient is allergic to this medication.      Pharmacy confirmed as    [facility] - [place], [place] - [address]: Yes    OK to leave a message on voice mail? Yes    Primary language: English      needed? No    Call taken on November 11, 2022 at 2:36 PM by Ale Rodriguez

## 2022-11-11 NOTE — TELEPHONE ENCOUNTER
Yes, please ask pharmacy to fill this script. Patient had GI intolerance to oral clindamycin -- topical clindamycin should not cause any issues for him and is a safe option.

## 2022-11-12 LAB
HIV 1+2 AB+HIV1 P24 AG SERPL QL IA: NONREACTIVE
SHBG SERPL-SCNC: 24 NMOL/L (ref 11–80)

## 2022-11-19 LAB
TESTOST FREE SERPL-MCNC: 17.64 NG/DL
TESTOST SERPL-MCNC: 676 NG/DL (ref 240–950)

## 2022-11-23 DIAGNOSIS — L73.9 FOLLICULITIS: ICD-10-CM

## 2022-11-23 RX ORDER — CLINDAMYCIN PHOSPHATE 10 MG/G
GEL TOPICAL
Qty: 60 G | Refills: 0 | OUTPATIENT
Start: 2022-11-23

## 2022-12-19 ENCOUNTER — TELEPHONE (OUTPATIENT)
Dept: FAMILY MEDICINE | Facility: CLINIC | Age: 42
End: 2022-12-19
Payer: COMMERCIAL

## 2022-12-19 NOTE — TELEPHONE ENCOUNTER
Prior Authorization Retail Medication Request    Medication/Dose: Request to renew prior auth for testosterone 200 mg/mL  ICD code (if different than what is on RX):  Hypogonadism male [E29.1]   Previously Tried and Failed:  See chart  Rationale: see chart    Insurance Name:  [employer]  Insurance ID:  [id number]       Pharmacy Information (if different than what is on RX)  Name:  [facility] - [facility] [address]  Phone:  [phone]    CoverMyMeds Green: [id number]    Annie Zamora RN

## 2023-01-03 DIAGNOSIS — E78.2 MIXED HYPERLIPIDEMIA: ICD-10-CM

## 2023-01-03 DIAGNOSIS — Z20.6 EXPOSURE TO HUMAN IMMUNODEFICIENCY VIRUS: ICD-10-CM

## 2023-01-03 RX ORDER — ATORVASTATIN CALCIUM 20 MG/1
TABLET, FILM COATED ORAL
Qty: 90 TABLET | Refills: 3 | Status: SHIPPED | OUTPATIENT
Start: 2023-01-03 | End: 2023-12-13

## 2023-01-03 RX ORDER — EMTRICITABINE AND TENOFOVIR DISOPROXIL FUMARATE 200; 300 MG/1; MG/1
1 TABLET, FILM COATED ORAL DAILY
Qty: 90 TABLET | Refills: 0 | Status: SHIPPED | OUTPATIENT
Start: 2023-01-03 | End: 2023-03-19

## 2023-01-03 NOTE — TELEPHONE ENCOUNTER
Central Prior Authorization Team - Phone: [phone]     Prior Authorization Approval    Authorization Effective Date: 12/4/2022  Authorization Expiration Date: 1/3/2024  Medication: Request to renew prior auth for testosterone 200 mg/mL- [place] APPROVED  Approved Dose/Quantity: 4  Reference #:     Insurance Company:    Expected CoPay:       CoPay Card Available:      Foundation Assistance Needed:    Which Pharmacy is filling the prescription (Not needed for infusion/clinic administered): [facility] - [place], [place] - [address]  Pharmacy Notified: Yes  Patient Notified: YesComment:  pharmacy will notify when ready

## 2023-01-03 NOTE — TELEPHONE ENCOUNTER
Central Prior Authorization Team - Phone: [phone]     PA Initiation    Medication: Request to renew prior auth for testosterone 200 mg/mL- [place] INITIATED  Insurance Company:    Pharmacy Filling the Rx: [facility] - [place], [place] - [address]  Filling Pharmacy Phone: [phone]  Filling Pharmacy Fax:    Start Date: 1/3/2023

## 2023-01-03 NOTE — TELEPHONE ENCOUNTER
"Last seen 11/11/22    Request for medication refill:  atorvastatin (LIPITOR) 20 MG tablet  emtricitabine-tenofovir (TRUVADA) 200-300 MG per tablet  Providers if patient needs an appointment and you are willing to give a one month supply please refill for one month and  send a letter/MyChart using \".SMILLIMITEDREFILL\" .smillimited and route chart to \"P [facility] \" (Giving one month refill in non controlled medications is strongly recommended before denial)    If refill has been denied, meaning absolutely no refills without visit, please complete the smart phrase \".smirxrefuse\" and route it to the \"P SMI MED REFILLS\"  pool to inform the patient and the pharmacy.    Denia Miller RN        "

## 2023-01-04 ENCOUNTER — TELEPHONE (OUTPATIENT)
Dept: FAMILY MEDICINE | Facility: CLINIC | Age: 43
End: 2023-01-04

## 2023-01-04 DIAGNOSIS — E29.1 HYPOGONADISM MALE: ICD-10-CM

## 2023-01-04 NOTE — TELEPHONE ENCOUNTER
PA Needed for 0.25mg Wegovy. BC[employer] Federal ID:[id number]    BIN: [id number]  PCN: [id number]  Group: [id number]    Please update liaison with result.    Thanks!  Steffen Mccurdy [facility]   Specialty/Endo Pharmacy Liaison Float  P [phone]  F [phone]

## 2023-01-05 RX ORDER — SYRINGE, DISPOSABLE, 1 ML
SYRINGE, EMPTY DISPOSABLE MISCELLANEOUS
Qty: 25 EACH | Refills: 3 | Status: SHIPPED | OUTPATIENT
Start: 2023-01-05 | End: 2023-02-13

## 2023-01-05 RX ORDER — NEEDLES, DISPOSABLE 25GX5/8"
NEEDLE, DISPOSABLE MISCELLANEOUS
Qty: 25 EACH | Refills: 3 | Status: SHIPPED | OUTPATIENT
Start: 2023-01-05

## 2023-01-05 RX ORDER — NEEDLES, DISPOSABLE 25GX5/8"
NEEDLE, DISPOSABLE MISCELLANEOUS
Qty: 25 EACH | Refills: 3 | Status: SHIPPED | OUTPATIENT
Start: 2023-01-05 | End: 2023-02-13

## 2023-01-05 NOTE — TELEPHONE ENCOUNTER
"Request for medication refill:  needle, disp, 18G X 1\" MISC  needle, disp, 22G X 1-1/2\" MISC  syringe, disposable, 1 ML MISC    Providers if patient needs an appointment and you are willing to give a one month supply please refill for one month and  send a letter/MyChart using \".SMILLIMITEDREFILL\" .smillimited and route chart to \"P SMI \" (Giving one month refill in non controlled medications is strongly recommended before denial)    If refill has been denied, meaning absolutely no refills without visit, please complete the smart phrase \".smirxrefuse\" and route it to the \"P SMI MED REFILLS\"  pool to inform the patient and the pharmacy.    Jung Marion MA        "

## 2023-01-09 NOTE — TELEPHONE ENCOUNTER
Prior Authorization Approval    Authorization Effective Date: 12/10/2022  Authorization Expiration Date: 7/8/2023  Medication: Wegovy-Semaglutide-Weight Management 0.25 MG/0.5ML SOAJ-PA approved  Approved Dose/Quantity:   Reference #:     Insurance Company: [employer] - Phone [phone] Fax [phone]  Expected CoPay:       CoPay Card Available:      Foundation Assistance Needed:    Which Pharmacy is filling the prescription (Not needed for infusion/clinic administered): [facility] - [place], [place] - [address]  Pharmacy Notified: No  Patient Notified: Yes

## 2023-01-09 NOTE — TELEPHONE ENCOUNTER
Central Prior Authorization Team   Phone: [phone]      PA Initiation    Medication: Wegovy-Semaglutide-Weight Management 0.25 MG/0.5ML SOAJ-PA initiated  Insurance Company: [employer] - Phone [phone] Fax [phone]  Pharmacy Filling the Rx: [facility] - [place], [place] - [address]  Filling Pharmacy Phone: [phone]  Filling Pharmacy Fax:    Start Date: 1/9/2023

## 2023-01-16 DIAGNOSIS — R06.09 EXERTIONAL DYSPNEA: ICD-10-CM

## 2023-01-17 RX ORDER — ALBUTEROL SULFATE 90 UG/1
AEROSOL, METERED RESPIRATORY (INHALATION)
Qty: 8.5 G | Refills: 2 | Status: SHIPPED | OUTPATIENT
Start: 2023-01-17 | End: 2024-07-10

## 2023-01-17 NOTE — TELEPHONE ENCOUNTER
"Request for medication refill:  albuterol (PROAIR HFA/PROVENTIL HFA/VENTOLIN HFA) 108 (90 Base) MCG/ACT inhaler    Providers if patient needs an appointment and you are willing to give a one month supply please refill for one month and  send a letter/MyChart using \".SMILLIMITEDREFILL\" .smillimited and route chart to \"P [facility]MI \" (Giving one month refill in non controlled medications is strongly recommended before denial)    If refill has been denied, meaning absolutely no refills without visit, please complete the smart phrase \".smirxrefuse\" and route it to the \"P SMI MED REFILLS\"  pool to inform the patient and the pharmacy.    Jung Marion MA        "

## 2023-03-06 ENCOUNTER — MYC MEDICAL ADVICE (OUTPATIENT)
Dept: FAMILY MEDICINE | Facility: CLINIC | Age: 43
End: 2023-03-06
Payer: COMMERCIAL

## 2023-03-06 DIAGNOSIS — E29.1 HYPOGONADISM MALE: ICD-10-CM

## 2023-03-07 RX ORDER — TESTOSTERONE CYPIONATE 200 MG/ML
50 INJECTION, SOLUTION INTRAMUSCULAR WEEKLY
Qty: 4 ML | Refills: 8 | Status: SHIPPED | OUTPATIENT
Start: 2023-03-07 | End: 2023-03-10

## 2023-03-07 NOTE — TELEPHONE ENCOUNTER
"Last seen 5/26/23    Request for medication refill:  testosterone cypionate (DEPOTESTOSTERONE) 200 MG/ML injection  Providers if patient needs an appointment and you are willing to give a one month supply please refill for one month and  send a letter/MyChart using \".SMILLIMITEDREFILL\" .smillimited and route chart to \"P SMI \" (Giving one month refill in non controlled medications is strongly recommended before denial)    If refill has been denied, meaning absolutely no refills without visit, please complete the smart phrase \".smirxrefuse\" and route it to the \"P SMI MED REFILLS\"  pool to inform the patient and the pharmacy.    Denia Miller RN          "

## 2023-03-09 DIAGNOSIS — E66.812 CLASS 2 OBESITY IN ADULT, UNSPECIFIED BMI, UNSPECIFIED OBESITY TYPE, UNSPECIFIED WHETHER SERIOUS COMORBIDITY PRESENT: ICD-10-CM

## 2023-03-10 RX ORDER — TESTOSTERONE CYPIONATE 200 MG/ML
50 INJECTION, SOLUTION INTRAMUSCULAR WEEKLY
Qty: 4 ML | Refills: 8 | Status: SHIPPED | OUTPATIENT
Start: 2023-03-10 | End: 2023-03-13

## 2023-03-13 ENCOUNTER — TELEPHONE (OUTPATIENT)
Dept: FAMILY MEDICINE | Facility: CLINIC | Age: 43
End: 2023-03-13
Payer: COMMERCIAL

## 2023-03-13 NOTE — TELEPHONE ENCOUNTER
Yes - I dated the 1.7mg weekly prescription to start 4/6/23 for this reason. 1mg weekly x4 weeks and then increase to 1.7mg weekly.

## 2023-03-13 NOTE — TELEPHONE ENCOUNTER
Called and spoke with [facility] to clarify Wegovy order, and also to check on the testosterone order that has been sent three times but they do not show as received.    They cannot figure out why it is not being received, but recommend patient get the medication elsewhere for the time being.    I called patient and he agreed to have the script sent to [facility] and he will  there    Denia Miller RN

## 2023-03-13 NOTE — TELEPHONE ENCOUNTER
[facility] phone call message- medication clarification/question:    Full Medication Name: Semaglutide-Weight Management (WEGOVY) 1.7 MG/0.75ML pen    Question: Kassi needs clarification on this medication. She needs to know if the patient should this strength (1.7mg) of this medication after the patient is finished with the 1 mg dosage.    Pharmacy confirmed as      [id number] - [address]    : Yes    OK to leave a message on voice mail? Yes    Primary language: English      needed? No    Call taken on March 13, 2023 at 12:45 PM by Orin Teague

## 2023-03-17 DIAGNOSIS — Z20.6 EXPOSURE TO HUMAN IMMUNODEFICIENCY VIRUS: ICD-10-CM

## 2023-03-19 RX ORDER — EMTRICITABINE AND TENOFOVIR DISOPROXIL FUMARATE 200; 300 MG/1; MG/1
1 TABLET, FILM COATED ORAL DAILY
Qty: 90 TABLET | Refills: 0 | Status: SHIPPED | OUTPATIENT
Start: 2023-03-19 | End: 2023-06-30

## 2023-03-23 NOTE — PROGRESS NOTES
Assessment & Plan     Attention deficit hyperactivity disorder (ADHD), unspecified ADHD type  Atomoxetine ineffective, had adverse SE. Reviewed options, will trial bupropion.   - buPROPion (WELLBUTRIN XL) 150 MG 24 hr tablet  Dispense: 30 tablet; Refill: 2    Hypogonadism male  Annual monitoring labs. Doing well on current testosterone dose.   - Hemoglobin  - Lipid panel reflex to direct LDL Fasting  - Comprehensive metabolic panel  - Hemoglobin  - Lipid panel reflex to direct LDL Fasting  - Comprehensive metabolic panel    Class 2 obesity in adult, unspecified BMI, unspecified obesity type, unspecified whether serious comorbidity present  Doing well on Wegovy.     Exposure to human immunodeficiency virus  On PrEP. q3mos HIV  - HIV Antigen Antibody Combo  - HIV Antigen Antibody Combo    Bee allergy status  - EPINEPHrine (ANY BX GENERIC EQUIV) 0.3 MG/0.3ML injection 2-pack  Dispense: 0.6 mL; Refill: 1    Folliculitis  Topical working well   - clindamycin (CLINDAMAX) 1 % external gel  Dispense: 60 g; Refill: 0    Right elbow pain  Suspect tendonitis  - Physical Therapy Referral    Benign essential hypertension  Persistently >140/90. Discussed med options, will start CCB  - amLODIPine (NORVASC) 5 MG tablet  Dispense: 30 tablet; Refill: 4  - f/u 2-4 weeks with BP checks (can check at work)    Encounter for immunization  - PNEUMOCOCCAL 20 VALENT CONJUGATE (PREVNAR 20)      Ordering of each unique test  Prescription drug management  20 minutes spent by me on the date of the encounter doing chart review, history and exam, documentation and further activities per the note      Return in about 3 months (around 6/24/2023) for Follow up, with me for bupropion and BP follow up - sooner if needed.    Mitra Payton DO  [facility] KAYLA Morales is a 42 year old, presenting for the following health issues:  Recheck Medication (Med follow up) and Pain (Right elbow tendonitis )    HPI     ADHD  "Follow-Up    Stopped taking atomoxetine - urinary frequency and decreased sensation   Not terribly helpful for inattention symptoms    ++++++++++++++++++++++++++++++++++++++++++++++++    R elbow - thinks tendonitis. 3 months, ebbs and flows.  strength decreased. Right handed.         Review of Systems   Pertinent positives and negatives per HPI.        Objective    BP (!) 154/89   Pulse 81   Temp 98.5  F (36.9  C) (Oral)   Resp 16   Ht 1.854 m (6' 1\")   Wt 125.5 kg (276 lb 11.2 oz)   SpO2 96%   BMI 36.51 kg/m    Body mass index is 36.51 kg/m .     Wt Readings from Last 4 Encounters:   04/14/23 125.5 kg (276 lb 11.2 oz)   03/24/23 125.5 kg (276 lb 11.2 oz)   11/11/22 129.4 kg (285 lb 3.2 oz)   05/26/22 130.6 kg (288 lb)     BP Readings from Last 3 Encounters:   04/14/23 (!) 142/94   03/24/23 (!) 154/89   11/11/22 (!) 134/90       Physical Exam   GENERAL: alert, cooperative, in no acute distress  HEENT: sclera clear  PULM: normal respiratory effort   MSK: R elbow normal appearance no edema, no erythema, no bony tenderness to palpation. Tender to palpation over cubital tunnel. Pain w/ wrist extension against resistance. Strength intact. Distal sensation intact.   NEURO: alert and oriented, grossly intact, moves all extremities, normal gait   SKIN: no rashes or lesions visualized   PSYCH: euthymic affect            "

## 2023-03-24 ENCOUNTER — OFFICE VISIT (OUTPATIENT)
Dept: FAMILY MEDICINE | Facility: CLINIC | Age: 43
End: 2023-03-24
Payer: COMMERCIAL

## 2023-03-24 VITALS
HEIGHT: 73 IN | DIASTOLIC BLOOD PRESSURE: 89 MMHG | RESPIRATION RATE: 16 BRPM | BODY MASS INDEX: 36.67 KG/M2 | SYSTOLIC BLOOD PRESSURE: 154 MMHG | OXYGEN SATURATION: 96 % | TEMPERATURE: 98.5 F | HEART RATE: 81 BPM | WEIGHT: 276.7 LBS

## 2023-03-24 DIAGNOSIS — F90.9 ATTENTION DEFICIT HYPERACTIVITY DISORDER (ADHD), UNSPECIFIED ADHD TYPE: Primary | ICD-10-CM

## 2023-03-24 DIAGNOSIS — L73.9 FOLLICULITIS: ICD-10-CM

## 2023-03-24 DIAGNOSIS — Z23 ENCOUNTER FOR IMMUNIZATION: ICD-10-CM

## 2023-03-24 DIAGNOSIS — Z91.030 BEE ALLERGY STATUS: ICD-10-CM

## 2023-03-24 DIAGNOSIS — E29.1 HYPOGONADISM MALE: ICD-10-CM

## 2023-03-24 DIAGNOSIS — M25.521 RIGHT ELBOW PAIN: ICD-10-CM

## 2023-03-24 DIAGNOSIS — Z20.6 EXPOSURE TO HUMAN IMMUNODEFICIENCY VIRUS: ICD-10-CM

## 2023-03-24 DIAGNOSIS — E66.812 CLASS 2 OBESITY IN ADULT, UNSPECIFIED BMI, UNSPECIFIED OBESITY TYPE, UNSPECIFIED WHETHER SERIOUS COMORBIDITY PRESENT: ICD-10-CM

## 2023-03-24 DIAGNOSIS — I10 BENIGN ESSENTIAL HYPERTENSION: ICD-10-CM

## 2023-03-24 LAB
ALBUMIN SERPL BCG-MCNC: 5 G/DL (ref 3.5–5.2)
ALP SERPL-CCNC: 75 U/L (ref 40–129)
ALT SERPL W P-5'-P-CCNC: 50 U/L (ref 10–50)
ANION GAP SERPL CALCULATED.3IONS-SCNC: 14 MMOL/L (ref 7–15)
AST SERPL W P-5'-P-CCNC: 29 U/L (ref 10–50)
BILIRUB SERPL-MCNC: 0.7 MG/DL
BUN SERPL-MCNC: 21.4 MG/DL (ref 6–20)
CALCIUM SERPL-MCNC: 9.4 MG/DL (ref 8.6–10)
CHLORIDE SERPL-SCNC: 104 MMOL/L (ref 98–107)
CHOLEST SERPL-MCNC: 142 MG/DL
CREAT SERPL-MCNC: 0.98 MG/DL (ref 0.67–1.17)
DEPRECATED HCO3 PLAS-SCNC: 23 MMOL/L (ref 22–29)
GFR SERPL CREATININE-BSD FRML MDRD: >90 ML/MIN/1.73M2
GLUCOSE SERPL-MCNC: 97 MG/DL (ref 70–99)
HDLC SERPL-MCNC: 29 MG/DL
HGB BLD-MCNC: 17.1 G/DL (ref 13.3–17.7)
HIV 1+2 AB+HIV1 P24 AG SERPL QL IA: NONREACTIVE
LDLC SERPL CALC-MCNC: 77 MG/DL
NONHDLC SERPL-MCNC: 113 MG/DL
POTASSIUM SERPL-SCNC: 4.1 MMOL/L (ref 3.4–5.3)
PROT SERPL-MCNC: 7.9 G/DL (ref 6.4–8.3)
SODIUM SERPL-SCNC: 141 MMOL/L (ref 136–145)
TRIGL SERPL-MCNC: 182 MG/DL

## 2023-03-24 PROCEDURE — 90471 IMMUNIZATION ADMIN: CPT | Performed by: STUDENT IN AN ORGANIZED HEALTH CARE EDUCATION/TRAINING PROGRAM

## 2023-03-24 PROCEDURE — 87389 HIV-1 AG W/HIV-1&-2 AB AG IA: CPT | Performed by: STUDENT IN AN ORGANIZED HEALTH CARE EDUCATION/TRAINING PROGRAM

## 2023-03-24 PROCEDURE — 90677 PCV20 VACCINE IM: CPT | Performed by: STUDENT IN AN ORGANIZED HEALTH CARE EDUCATION/TRAINING PROGRAM

## 2023-03-24 PROCEDURE — 36415 COLL VENOUS BLD VENIPUNCTURE: CPT | Performed by: STUDENT IN AN ORGANIZED HEALTH CARE EDUCATION/TRAINING PROGRAM

## 2023-03-24 PROCEDURE — 80061 LIPID PANEL: CPT | Performed by: STUDENT IN AN ORGANIZED HEALTH CARE EDUCATION/TRAINING PROGRAM

## 2023-03-24 PROCEDURE — 80053 COMPREHEN METABOLIC PANEL: CPT | Performed by: STUDENT IN AN ORGANIZED HEALTH CARE EDUCATION/TRAINING PROGRAM

## 2023-03-24 PROCEDURE — 85018 HEMOGLOBIN: CPT | Performed by: STUDENT IN AN ORGANIZED HEALTH CARE EDUCATION/TRAINING PROGRAM

## 2023-03-24 PROCEDURE — 99214 OFFICE O/P EST MOD 30 MIN: CPT | Mod: 25 | Performed by: STUDENT IN AN ORGANIZED HEALTH CARE EDUCATION/TRAINING PROGRAM

## 2023-03-24 RX ORDER — CLINDAMYCIN PHOSPHATE 10 MG/G
GEL TOPICAL 2 TIMES DAILY
Qty: 60 G | Refills: 0 | Status: SHIPPED | OUTPATIENT
Start: 2023-03-24 | End: 2024-08-23

## 2023-03-24 RX ORDER — AMLODIPINE BESYLATE 5 MG/1
5 TABLET ORAL DAILY
Qty: 30 TABLET | Refills: 4 | Status: SHIPPED | OUTPATIENT
Start: 2023-03-24 | End: 2023-04-25

## 2023-03-24 RX ORDER — BUPROPION HYDROCHLORIDE 150 MG/1
150 TABLET ORAL EVERY MORNING
Qty: 30 TABLET | Refills: 2 | Status: SHIPPED | OUTPATIENT
Start: 2023-03-24 | End: 2023-06-12

## 2023-03-24 RX ORDER — EPINEPHRINE 0.3 MG/.3ML
0.3 INJECTION SUBCUTANEOUS
Qty: 0.6 ML | Refills: 1 | Status: SHIPPED | OUTPATIENT
Start: 2023-03-24 | End: 2024-02-26

## 2023-03-24 ASSESSMENT — PAIN SCALES - GENERAL: PAINLEVEL: MILD PAIN (2)

## 2023-03-29 ENCOUNTER — THERAPY VISIT (OUTPATIENT)
Dept: PHYSICAL THERAPY | Facility: CLINIC | Age: 43
End: 2023-03-29
Attending: STUDENT IN AN ORGANIZED HEALTH CARE EDUCATION/TRAINING PROGRAM
Payer: COMMERCIAL

## 2023-03-29 DIAGNOSIS — M77.11 LATERAL EPICONDYLITIS OF RIGHT ELBOW: Primary | ICD-10-CM

## 2023-03-29 DIAGNOSIS — M25.521 RIGHT ELBOW PAIN: ICD-10-CM

## 2023-03-29 PROCEDURE — 97161 PT EVAL LOW COMPLEX 20 MIN: CPT | Mod: GP | Performed by: PHYSICAL THERAPIST

## 2023-03-29 PROCEDURE — 97110 THERAPEUTIC EXERCISES: CPT | Mod: GP | Performed by: PHYSICAL THERAPIST

## 2023-03-29 PROCEDURE — 97140 MANUAL THERAPY 1/> REGIONS: CPT | Mod: GP | Performed by: PHYSICAL THERAPIST

## 2023-03-29 NOTE — PROGRESS NOTES
Physical Therapy Initial Evaluation  Subjective:  The history is provided by the patient.   Patient Health History  Andrew Coulter being seen for Right elbow tendinitis.     Problem began: 11/1/2022.   Problem occurred: lateral elbow pain, first aching and sore. Trialed ice and NSAIDS, but now impacting , lifting   Pain is reported as 5/10 on pain scale.  General health as reported by patient is good.  Pertinent medical history includes: high blood pressure and overweight.     Medical allergies: other. Other medical allergies details: As listed.   Surgeries include:  None.    Current medications:  Anti-depressants, anti-inflammatory, high blood pressure medication and hormone replacement therapy.       Primary job tasks include:  Computer work, lifting/carrying, prolonged sitting and prolonged standing.   Other job/home tasks details: R hand dominant; hockey, gym, cooking and baking.                Therapist Generated HPI Evaluation         Type of problem:  Right elbow.    This is a new condition.  Condition occurred with:  Insidious onset.  Where condition occurred: for unknown reasons.  Patient reports pain:  Lateral and lateral epicondyle.  Pain is described as aching and sharp and is intermittent.  Pain radiates to:  Elbow.   Since onset symptoms are gradually worsening.  Associated symptoms:  Loss of motion/stiffness. Symptoms are exacerbated by lifting and carrying  and relieved by NSAID's.                              Objective:  Standing Alignment:    Cervical/Thoracic:  Normal  Shoulder/UE:  Rounded shoulders                                            ROM:  AROM:      Flexion Elbow:  Left:wnl   Right:dec end range  Extension Elbow:  Left: wnl    Right: dec end range    Extension Wrist:  Left: wnl    Right:  Painful          PROM:      Flexion Elbow:  Right: end range pain  Extension Elbow: Right: boggy end feel and pain      Supination Elbow/Wrist: Right: restricted end range  Pronation Elbow/Wrist:  Right: restricted end range          :  Dominance: Right   Left: 60 lb      Right: 85 lb    Special Testing:    Left wrist/elbow negative for the following special tests:   Lateral Epicondylitis  Right wrist/elbow positive for the following special tests: Lateral Epicondylitis  Palpation:    Left wrist/elbow tenderness not present at:Lateral Epicondyle; Olecranon Bursa or Wrist Extensors  Right wrist/elbow tenderness present at:Lateral Epicondyle (effusion, thickness and tender to palpation); Olecranon Bursa (effusion noted) and Wrist Extensors  Mobility:    Proximal Radioulnar:  Right:  Hypomobile                                        General     ROS    Assessment/Plan:    Patient is a 42 year old male with right side elbow complaints.    Patient has the following significant findings with corresponding treatment plan.                Diagnosis 1:  Lateral epicondylitis    Pain -  hot/cold therapy, US, manual therapy, splint/taping/bracing/orthotics, self management, education and home program  Decreased ROM/flexibility - manual therapy, therapeutic exercise and home program  Decreased joint mobility - manual therapy, therapeutic exercise and home program  Decreased strength - therapeutic exercise, therapeutic activities and home program  Impaired posture - neuro re-education, therapeutic activities and home program    Therapy Evaluation Codes:   Cumulative Therapy Evaluation is: Low complexity.    Previous and current functional limitations:  (See Goal Flow Sheet for this information)    Short term and Long term goals: (See Goal Flow Sheet for this information)     Communication ability:  Patient appears to be able to clearly communicate and understand verbal and written communication and follow directions correctly.  Treatment Explanation - The following has been discussed with the patient:   RX ordered/plan of care  Anticipated outcomes  Possible risks and side effects  This patient would benefit  from PT intervention to resume normal activities.   Rehab potential is good.    Frequency:  1 X a week, once daily  Duration:  for 6 weeks tapering to 2 X a month over 6 weeks  Discharge Plan:  Achieve all LTG.  Independent in home treatment program.  Reach maximal therapeutic benefit.    Please refer to the daily flowsheet for treatment today, total treatment time and time spent performing 1:1 timed codes.

## 2023-04-04 ENCOUNTER — THERAPY VISIT (OUTPATIENT)
Dept: PHYSICAL THERAPY | Facility: CLINIC | Age: 43
End: 2023-04-04
Payer: COMMERCIAL

## 2023-04-04 DIAGNOSIS — M77.11 LATERAL EPICONDYLITIS OF RIGHT ELBOW: ICD-10-CM

## 2023-04-04 DIAGNOSIS — L73.9 FOLLICULITIS: ICD-10-CM

## 2023-04-04 DIAGNOSIS — M25.521 RIGHT ELBOW PAIN: Primary | ICD-10-CM

## 2023-04-04 PROCEDURE — 97026 INFRARED THERAPY: CPT | Mod: GP | Performed by: PHYSICAL THERAPIST

## 2023-04-04 PROCEDURE — 97110 THERAPEUTIC EXERCISES: CPT | Mod: GP | Performed by: PHYSICAL THERAPIST

## 2023-04-04 PROCEDURE — 97140 MANUAL THERAPY 1/> REGIONS: CPT | Mod: GP | Performed by: PHYSICAL THERAPIST

## 2023-04-05 RX ORDER — CLINDAMYCIN PHOSPHATE 10 MG/G
GEL TOPICAL
Qty: 60 G | Refills: 0 | OUTPATIENT
Start: 2023-04-05

## 2023-04-12 ENCOUNTER — MYC MEDICAL ADVICE (OUTPATIENT)
Dept: FAMILY MEDICINE | Facility: CLINIC | Age: 43
End: 2023-04-12

## 2023-04-12 ENCOUNTER — THERAPY VISIT (OUTPATIENT)
Dept: PHYSICAL THERAPY | Facility: CLINIC | Age: 43
End: 2023-04-12
Payer: COMMERCIAL

## 2023-04-12 DIAGNOSIS — M77.11 LATERAL EPICONDYLITIS OF RIGHT ELBOW: ICD-10-CM

## 2023-04-12 DIAGNOSIS — M25.521 RIGHT ELBOW PAIN: Primary | ICD-10-CM

## 2023-04-12 PROCEDURE — 97140 MANUAL THERAPY 1/> REGIONS: CPT | Mod: GP | Performed by: PHYSICAL THERAPIST

## 2023-04-12 PROCEDURE — 97026 INFRARED THERAPY: CPT | Mod: GP | Performed by: PHYSICAL THERAPIST

## 2023-04-13 NOTE — TELEPHONE ENCOUNTER
DIAGNOSIS: (R) Elbow pain   APPOINTMENT DATE: 04/14/2023   NOTES STATUS DETAILS   OFFICE NOTE from referring provider Internal 03/24/2023 Dr Payton [facility]    OFFICE NOTE from other specialist Internal 04/12/2023 PT [facility]    DISCHARGE SUMMARY from hospital N/A    DISCHARGE REPORT from the ER N/A    OPERATIVE REPORT N/A    EMG report N/A    MEDICATION LIST N/A    MRI N/A    DEXA (osteoporosis/bone health) N/A    CT SCAN N/A    XRAYS (IMAGES & REPORTS) N/A

## 2023-04-13 NOTE — PROGRESS NOTES
Andrew Coulter  :  1980  DOS: 2023  MRN: [id number]  PCP: Mitra Payton    Sports Medicine Clinic Visit      HPI  Andrew Coulter is a 42 year old RHD male who is seen in consultation at the request of  Mitra Payton D.O. presenting with right elbow pain.    - Mechanism of Injury:  No inciting injury.   - Prior evaluation:    - 3/24/2023 office visit with Mitra Payton DO.  Diagnosed with lateral epicondylosis.    - 3 physical therapy sessions with noted lack of improvement, discussed and was referred to OT.  Deferred yesterday's initial appointment until after today's Ortho visit. Interested in an injection.    - Pain Character:  Pain has been present for 5 months. Seems to be worsening over the past 5 months.  Pain is in the lateral right elbow, with radiation into the lateral distal upper arm and lateral proximal forearm.   - Endorses:  Swelling in the lateral epicondyle  - Denies:  clicking, popping, grinding, mechanical locking symptoms, instability, numbness, tingling, radicular shooting pain  - Alleviating factors:  Rest, activity modification  - Aggravating factors: Supination, opening jars, lifting  - Treatments tried: NSAIDs (no more), PT (without improvement), OT (not started yet), wrist splint at night.     - Patient Goals:  get a formal diagnosis, discuss treatment options  - Social History: Nurse       Review of Systems  Musculoskeletal: as above  Remainder of review of systems is negative including constitutional, CV, pulmonary, GI, Skin and Neurologic except as noted in HPI or medical history.    Past Medical History:   Diagnosis Date     Gastroesophageal reflux disease      Hyperlipidemia      Past Surgical History:   Procedure Laterality Date     HERNIA REPAIR       ORCHIECTOMY INGUINAL Left 2011     SEPTOPLASTY      2010 about     VARICOCELECTOMY Left 2009    ** repair     Family History   Problem Relation Age of Onset     Hyperlipidemia Mother      Depression Mother       Sleep Apnea Mother      Hyperlipidemia Father      Anxiety Disorder Father      Substance Abuse Father      Hypertension Maternal Grandmother      Anxiety Disorder Maternal Grandmother      Hypertension Maternal Grandfather      Diabetes Maternal Grandfather      Substance Abuse Paternal Grandfather      Cerebrovascular Disease Paternal Grandfather          Objective  BP (!) 142/94   Wt 125.5 kg (276 lb 11.2 oz)   BMI 36.51 kg/m        General: healthy, alert and in no acute distress.      HEENT: no scleral icterus or conjunctival erythema.     Skin: no suspicious lesions or rash. No jaundice.     CV: regular rhythm by palpation, 2+ distal pulses.    Resp: normal respiratory effort without conversational dyspnea.     Psych: normal mood and affect.      Gait: nonantalgic, appropriate coordination and balance.     Neuro:        - Sensation to light touch:    - Intact throughout the BUE including all peripheral nerve distributions.        - MSR:       RUE  LUE  - Biceps  2+ 2+  - Brachioradialis 2+ 2+  - Triceps  2+ 2+       - Special tests:   - Spurling's:  Neg bilaterally   - Tinel's at the wrist:  Neg    - Tinel's at the elbow:  Neg     MSK - Elbow:       - Inspection:    - No significant swelling, erythema, warmth, ecchymosis, lesion, or atrophy noted.        - ROM:    - Full AROM/PROM with pain during wrist extension, supination.       - Palpation:    - TTP at the lateral epicondyle/common extensor tendon mass  - NTTP elsewhere.        - Strength:    RUE LUE  - Shoulder Abduction   5 5   - Shoulder Flexion   5 5   - Shoulder Internal Rotation  5 5   - Shoulder External Rotation  5 5  - Elbow Flexion   5 5  - Elbow Extension   5 5  - Forearm Pronation   5 5  - Forearm Supination   5* 5  - Wrist Extension   5* 5  - Wrist Flexion    5 5  - FDI     5 5  - ADM     5 5  - FPL     5 5  - APB     5 5  - EIP     5 5  - EDC     5 5  - APL/EPB    5 5           - Special tests:  - Cozen's:  ++Pos  - VEO:  Neg   - Valgus  stress:  Neg    - Varus stress:  Neg       Radiology  I independently reviewed today's new relevant imaging, with the following interpretation:  -XR R elbow shows normal anatomic alignment without joint effusion, no fractures or dislocations.  Small triceps enthesopathy present.      Procedure  Hand / Upper Extremity Injection/Arthrocentesis: R elbow    Date/Time: 4/14/2023 9:21 AM    Performed by: Shantanu Rendon DO  Authorized by: Shantanu Rendon DO    Indications:  Pain  Needle Size:  25 G  Guidance: landmark    Approach:  Lateral  Condition: epicondylitis, lateral      Site:  R elbow  Medications:  40 mg triamcinolone 40 MG/ML; 1 mL bupivacaine 0.5 %; 1 mL lidocaine 1 %  Outcome:  Tolerated well, no immediate complications  Procedure discussed: discussed risks, benefits, and alternatives    Consent Given by:  Patient  Prep: patient was prepped and draped in usual sterile fashion        Procedure:   Lateral Epicondylosis, Common Extensor Tendon Sheath Injection - Right  Consent given, and signed on chart. Lateral epicondyle was identified and marked just distal to the epicondyle for injection. Injection site was prepared with chlorhexidine solution.  A 1.5 inch 25 gauge needle was placed just distal to the lateral epicondyle within the tendon sheath of the common extensor tendons of the forearm, taking care not to inject the tendons themselves.  A mixture of 1ml 1% lidocaine, 1ml of 0.5% bupivacaine, and 1ml of kenalog (40mg/ml) was injected without difficulty.  After removal of the needle, the area was cleaned, hemostasis achieved, and bandage applied.      Patient tolerated the procedure well, no complications.    Assessment  1. Lateral epicondylitis of right elbow        Plan  Andrew Coulter is a 42 year old male that presents with right lateral elbow pain. History and physical exam appear most consistent with lateral epicondylosis (tennis elbow).  Discussed the nature of the condition and treatment options  and mutually agreed upon the following plan:    - Imaging:          - Reviewed relevant imaging in the chart.  -XR R elbow ordered today pre-clinic and independently interpreted by myself.    - Reviewed results and images with patient.   - Medications:          - Discussed pharmacologic options for pain relief.   - May use NSAIDs (Ibuprofen, Naproxen) or Acetaminophen (Tylenol) as needed for pain control.   - May also use topical medications such as lidocaine, IcyHot, BioFreeze, or Voltaren gel as needed for pain control.  May use Voltaren gel up to 4 times per day as needed over the lateral epicondyle to assist with pain control.  - Injections:          - Discussed possible injection options and alternatives.    - Options include corticosteroid injection to the lateral epicondyle/common extensor tendon sheath.  - Performed a corticosteroid injection of the right lateral epicondyle/common extensor tendon sheath (tennis elbow injection) today in clinic. Patient tolerated the procedure well without complications.    - Post-procedure instructions:    - Keep the injection site clean and dry.   - Do not submerge the injection site for 24 hours (no baths, pools). Showers are ok.   - Rest the area for 24-48 hours before resuming normal activities. Avoid overexerting the area for the first few weeks.   - It may take 2-3 days to start noticing the effects of the injection and up to 3-4 weeks to feel significant benefits.   - Therapy:          - Discussed the benefits of physical therapy vs home exercise program for optimization of range of motion, flexibility, strength, stability and function.   - Completed 3 sessions of PT and has transitioned to OT, planning to start in 2 weeks.  - Modalities:          - May use ice, heat, massage or other modalities as needed.   - Bracing:          - Discussed bracing options and recommend using a tennis elbow counterforce strap with the pressure contact over the wrist extensor tendons  just distal to the attachment to the lateral epicondyle.  Instructed to wear the brace during exacerbating activities.  Do not need to wear the brace at rest or at night.  Has a well fitting brace at home.  - Activity:          - Encouraged to remain active and participate in regular activities as symptoms allow.    - Follow up:          - As needed in the future for re-evaluation and update to treatment plan, or sooner for new/worsening symptoms.  - Patient has clinic contact information for questions or concerns.       Shantanu Rendon DO, ALBERTO  [facility] - Sports Medicine  [facility] - Department of Orthopedic Surgery       Disclaimer:  This note was prepared and written using Dragon Medical dictation software. As a result, there may be errors in the script that have gone undetected. Please consider this when interpreting the information in this note.

## 2023-04-14 ENCOUNTER — OFFICE VISIT (OUTPATIENT)
Dept: ORTHOPEDICS | Facility: CLINIC | Age: 43
End: 2023-04-14
Attending: STUDENT IN AN ORGANIZED HEALTH CARE EDUCATION/TRAINING PROGRAM
Payer: COMMERCIAL

## 2023-04-14 ENCOUNTER — PRE VISIT (OUTPATIENT)
Dept: ORTHOPEDICS | Facility: CLINIC | Age: 43
End: 2023-04-14

## 2023-04-14 ENCOUNTER — ANCILLARY PROCEDURE (OUTPATIENT)
Dept: GENERAL RADIOLOGY | Facility: CLINIC | Age: 43
End: 2023-04-14
Attending: STUDENT IN AN ORGANIZED HEALTH CARE EDUCATION/TRAINING PROGRAM
Payer: COMMERCIAL

## 2023-04-14 VITALS — BODY MASS INDEX: 36.51 KG/M2 | WEIGHT: 276.7 LBS | DIASTOLIC BLOOD PRESSURE: 94 MMHG | SYSTOLIC BLOOD PRESSURE: 142 MMHG

## 2023-04-14 DIAGNOSIS — M77.11 LATERAL EPICONDYLITIS OF RIGHT ELBOW: Primary | ICD-10-CM

## 2023-04-14 DIAGNOSIS — M25.521 RIGHT ELBOW PAIN: ICD-10-CM

## 2023-04-14 DIAGNOSIS — E66.812 CLASS 2 OBESITY IN ADULT, UNSPECIFIED BMI, UNSPECIFIED OBESITY TYPE, UNSPECIFIED WHETHER SERIOUS COMORBIDITY PRESENT: ICD-10-CM

## 2023-04-14 PROCEDURE — 73080 X-RAY EXAM OF ELBOW: CPT | Mod: RT | Performed by: RADIOLOGY

## 2023-04-14 PROCEDURE — 99204 OFFICE O/P NEW MOD 45 MIN: CPT | Mod: 25 | Performed by: STUDENT IN AN ORGANIZED HEALTH CARE EDUCATION/TRAINING PROGRAM

## 2023-04-14 PROCEDURE — 20550 NJX 1 TENDON SHEATH/LIGAMENT: CPT | Mod: RT | Performed by: STUDENT IN AN ORGANIZED HEALTH CARE EDUCATION/TRAINING PROGRAM

## 2023-04-14 RX ADMIN — LIDOCAINE HYDROCHLORIDE 1 ML: 10 INJECTION, SOLUTION INFILTRATION; PERINEURAL at 09:21

## 2023-04-14 RX ADMIN — BUPIVACAINE HYDROCHLORIDE 1 ML: 5 INJECTION, SOLUTION PERINEURAL at 09:21

## 2023-04-14 RX ADMIN — TRIAMCINOLONE ACETONIDE 40 MG: 40 INJECTION, SUSPENSION INTRA-ARTICULAR; INTRAMUSCULAR at 09:21

## 2023-04-14 ASSESSMENT — PAIN SCALES - GENERAL: PAINLEVEL: SEVERE PAIN (6)

## 2023-04-14 NOTE — LETTER
2023         RE: Andrew Coulter  [address] [facility] [place] [address]        Dear Colleague,    Thank you for referring your patient, Andrew Coulter, to the [facility] [place]. Please see a copy of my visit note below.    Andrew Coulter  :  1980  DOS: 2023  MRN: [id number]  PCP: Mitra Payton    Sports Medicine Clinic Visit      HPI  Andrew Coulter is a 42 year old RHD male who is seen in consultation at the request of  Mitra Payton D.O. presenting with right elbow pain.    - Mechanism of Injury:  No inciting injury.   - Prior evaluation:    - 3/24/2023 office visit with Mitra Payton DO.  Diagnosed with lateral epicondylosis.    - 3 physical therapy sessions with noted lack of improvement, discussed and was referred to OT.  Deferred yesterday's initial appointment until after today's Ortho visit. Interested in an injection.    - Pain Character:  Pain has been present for 5 months. Seems to be worsening over the past 5 months.  Pain is in the lateral right elbow, with radiation into the lateral distal upper arm and lateral proximal forearm.   - Endorses:  Swelling in the lateral epicondyle  - Denies:  clicking, popping, grinding, mechanical locking symptoms, instability, numbness, tingling, radicular shooting pain  - Alleviating factors:  Rest, activity modification  - Aggravating factors: Supination, opening jars, lifting  - Treatments tried: NSAIDs (no more), PT (without improvement), OT (not started yet), wrist splint at night.     - Patient Goals:  get a formal diagnosis, discuss treatment options  - Social History: Nurse       Review of Systems  Musculoskeletal: as above  Remainder of review of systems is negative including constitutional, CV, pulmonary, GI, Skin and Neurologic except as noted in HPI or medical history.    Past Medical History:   Diagnosis Date     Gastroesophageal reflux disease      Hyperlipidemia      Past  Surgical History:   Procedure Laterality Date     HERNIA REPAIR       ORCHIECTOMY INGUINAL Left 2011     SEPTOPLASTY      2010 about     VARICOCELECTOMY Left 2009    ** repair     Family History   Problem Relation Age of Onset     Hyperlipidemia Mother      Depression Mother      Sleep Apnea Mother      Hyperlipidemia Father      Anxiety Disorder Father      Substance Abuse Father      Hypertension Maternal Grandmother      Anxiety Disorder Maternal Grandmother      Hypertension Maternal Grandfather      Diabetes Maternal Grandfather      Substance Abuse Paternal Grandfather      Cerebrovascular Disease Paternal Grandfather          Objective  BP (!) 142/94   Wt 125.5 kg (276 lb 11.2 oz)   BMI 36.51 kg/m      General: healthy, alert and in no acute distress.    HEENT: no scleral icterus or conjunctival erythema.   Skin: no suspicious lesions or rash. No jaundice.   CV: regular rhythm by palpation, 2+ distal pulses.  Resp: normal respiratory effort without conversational dyspnea.   Psych: normal mood and affect.    Gait: nonantalgic, appropriate coordination and balance.     Neuro:        - Sensation to light touch:    - Intact throughout the BUE including all peripheral nerve distributions.        - MSR:       RUE  LUE  - Biceps  2+ 2+  - Brachioradialis 2+ 2+  - Triceps  2+ 2+       - Special tests:   - Spurling's:  Neg bilaterally   - Tinel's at the wrist:  Neg    - Tinel's at the elbow:  Neg     MSK - Elbow:       - Inspection:    - No significant swelling, erythema, warmth, ecchymosis, lesion, or atrophy noted.        - ROM:    - Full AROM/PROM with pain during wrist extension, supination.       - Palpation:    - TTP at the lateral epicondyle/common extensor tendon mass  - NTTP elsewhere.        - Strength:    RUE LUE  - Shoulder Abduction   5 5   - Shoulder Flexion   5 5   - Shoulder Internal Rotation  5 5   - Shoulder External Rotation  5 5  - Elbow Flexion   5 5  - Elbow Extension   5 5  - Forearm  Pronation   5 5  - Forearm Supination   5* 5  - Wrist Extension   5* 5  - Wrist Flexion    5 5  - FDI     5 5  - ADM     5 5  - FPL     5 5  - APB     5 5  - EIP     5 5  - EDC     5 5  - APL/EPB    5 5           - Special tests:  - Cozen's:  ++Pos  - VEO:  Neg   - Valgus stress:  Neg    - Varus stress:  Neg       Radiology  I independently reviewed today's new relevant imaging, with the following interpretation:  -XR R elbow shows normal anatomic alignment without joint effusion, no fractures or dislocations.  Small triceps enthesopathy present.      Procedure  Hand / Upper Extremity Injection/Arthrocentesis: R elbow    Date/Time: 4/14/2023 9:21 AM    Performed by: Shantanu Rendon DO  Authorized by: Shantanu Rendon DO    Indications:  Pain  Needle Size:  25 G  Guidance: landmark    Approach:  Lateral  Condition: epicondylitis, lateral      Site:  R elbow  Medications:  40 mg triamcinolone 40 MG/ML; 1 mL bupivacaine 0.5 %; 1 mL lidocaine 1 %  Outcome:  Tolerated well, no immediate complications  Procedure discussed: discussed risks, benefits, and alternatives    Consent Given by:  Patient  Prep: patient was prepped and draped in usual sterile fashion        Procedure:   Lateral Epicondylosis, Common Extensor Tendon Sheath Injection - Right  Consent given, and signed on chart. Lateral epicondyle was identified and marked just distal to the epicondyle for injection. Injection site was prepared with chlorhexidine solution.  A 1.5 inch 25 gauge needle was placed just distal to the lateral epicondyle within the tendon sheath of the common extensor tendons of the forearm, taking care not to inject the tendons themselves.  A mixture of 1ml 1% lidocaine, 1ml of 0.5% bupivacaine, and 1ml of kenalog (40mg/ml) was injected without difficulty.  After removal of the needle, the area was cleaned, hemostasis achieved, and bandage applied.      Patient tolerated the procedure well, no complications.    Assessment  1. Lateral  epicondylitis of right elbow        Plan  Andrew Coulter is a 42 year old male that presents with right lateral elbow pain. History and physical exam appear most consistent with lateral epicondylosis (tennis elbow).  Discussed the nature of the condition and treatment options and mutually agreed upon the following plan:    - Imaging:          - Reviewed relevant imaging in the chart.  -XR R elbow ordered today pre-clinic and independently interpreted by myself.    - Reviewed results and images with patient.   - Medications:          - Discussed pharmacologic options for pain relief.   - May use NSAIDs (Ibuprofen, Naproxen) or Acetaminophen (Tylenol) as needed for pain control.   - May also use topical medications such as lidocaine, IcyHot, BioFreeze, or Voltaren gel as needed for pain control.  May use Voltaren gel up to 4 times per day as needed over the lateral epicondyle to assist with pain control.  - Injections:          - Discussed possible injection options and alternatives.    - Options include corticosteroid injection to the lateral epicondyle/common extensor tendon sheath.  - Performed a corticosteroid injection of the right lateral epicondyle/common extensor tendon sheath (tennis elbow injection) today in clinic. Patient tolerated the procedure well without complications.    - Post-procedure instructions:    - Keep the injection site clean and dry.   - Do not submerge the injection site for 24 hours (no baths, pools). Showers are ok.   - Rest the area for 24-48 hours before resuming normal activities. Avoid overexerting the area for the first few weeks.   - It may take 2-3 days to start noticing the effects of the injection and up to 3-4 weeks to feel significant benefits.   - Therapy:          - Discussed the benefits of physical therapy vs home exercise program for optimization of range of motion, flexibility, strength, stability and function.   - Completed 3 sessions of PT and has transitioned to OT,  planning to start in 2 weeks.  - Modalities:          - May use ice, heat, massage or other modalities as needed.   - Bracing:          - Discussed bracing options and recommend using a tennis elbow counterforce strap with the pressure contact over the wrist extensor tendons just distal to the attachment to the lateral epicondyle.  Instructed to wear the brace during exacerbating activities.  Do not need to wear the brace at rest or at night.  Has a well fitting brace at home.  - Activity:          - Encouraged to remain active and participate in regular activities as symptoms allow.    - Follow up:          - As needed in the future for re-evaluation and update to treatment plan, or sooner for new/worsening symptoms.  - Patient has clinic contact information for questions or concerns.       Shantanu Rendon DO, CAQSM  [facility] - Sports Medicine  [facility] - Department of Orthopedic Surgery       Disclaimer:  This note was prepared and written using Dragon Medical dictation software. As a result, there may be errors in the script that have gone undetected. Please consider this when interpreting the information in this note.       Again, thank you for allowing me to participate in the care of your patient.        Sincerely,        Shantanu Rendon DO

## 2023-04-14 NOTE — PATIENT INSTRUCTIONS
Baudilio Andrew Coulter ,     A copy of your assessment and our treatment plan that we discussed together is included below, as written in your medical chart.   If you have any questions, please feel free to call the clinic.     --------------------------------------------------  Andrew Coulter is a 42 year old male that presents with right lateral elbow pain. History and physical exam appear most consistent with lateral epicondylosis (tennis elbow).  Discussed the nature of the condition and treatment options and mutually agreed upon the following plan:    - Imaging:          - Reviewed relevant imaging in the chart.  -XR R elbow ordered today pre-clinic and independently interpreted by myself.    - Reviewed results and images with patient.   - Medications:          - Discussed pharmacologic options for pain relief.   - May use NSAIDs (Ibuprofen, Naproxen) or Acetaminophen (Tylenol) as needed for pain control.   - May also use topical medications such as lidocaine, IcyHot, BioFreeze, or Voltaren gel as needed for pain control.  May use Voltaren gel up to 4 times per day as needed over the lateral epicondyle to assist with pain control.  - Injections:          - Discussed possible injection options and alternatives.    - Options include corticosteroid injection to the lateral epicondyle/common extensor tendon sheath.  - Performed a corticosteroid injection of the right lateral epicondyle/common extensor tendon sheath (tennis elbow injection) today in clinic. Patient tolerated the procedure well without complications.    - Post-procedure instructions:    - Keep the injection site clean and dry.   - Do not submerge the injection site for 24 hours (no baths, pools). Showers are ok.   - Rest the area for 24-48 hours before resuming normal activities. Avoid overexerting the area for the first few weeks.   - It may take 2-3 days to start noticing the effects of the injection and up to 3-4 weeks to feel significant  benefits.   - Therapy:          - Discussed the benefits of physical therapy vs home exercise program for optimization of range of motion, flexibility, strength, stability and function.   - Completed 3 sessions of PT and has transitioned to OT, planning to start in 2 weeks.  - Modalities:          - May use ice, heat, massage or other modalities as needed.   - Bracing:          - Discussed bracing options and recommend using a tennis elbow counterforce strap with the pressure contact over the wrist extensor tendons just distal to the attachment to the lateral epicondyle.  Instructed to wear the brace during exacerbating activities.  Do not need to wear the brace at rest or at night.  Has a well fitting brace at home.  - Activity:          - Encouraged to remain active and participate in regular activities as symptoms allow.    - Follow up:          - As needed in the future for re-evaluation and update to treatment plan, or sooner for new/worsening symptoms.  - Patient has clinic contact information for questions or concerns.   --------------------------------------------------    It was a pleasure seeing you today. Thank you for choosing [facility] for your care.       Shantanu Rendon DO, CAQSM  [facility] - Sports Medicine  [facility] - Department of Orthopedic Surgery     Disclaimer:  This note was prepared and written using Dragon Medical dictation software. As a result, there may be errors in the script that have gone undetected. Please consider this when interpreting the information in this note.

## 2023-04-22 RX ORDER — BUPIVACAINE HYDROCHLORIDE 5 MG/ML
1 INJECTION, SOLUTION PERINEURAL
Status: SHIPPED | OUTPATIENT
Start: 2023-04-14

## 2023-04-22 RX ORDER — TRIAMCINOLONE ACETONIDE 40 MG/ML
40 INJECTION, SUSPENSION INTRA-ARTICULAR; INTRAMUSCULAR
Status: SHIPPED | OUTPATIENT
Start: 2023-04-14

## 2023-04-22 RX ORDER — LIDOCAINE HYDROCHLORIDE 10 MG/ML
1 INJECTION, SOLUTION INFILTRATION; PERINEURAL
Status: SHIPPED | OUTPATIENT
Start: 2023-04-14

## 2023-04-25 PROBLEM — E66.01 MORBID OBESITY (H): Status: ACTIVE | Noted: 2023-04-25

## 2023-04-26 NOTE — PROGRESS NOTES
DISCHARGE SUMMARY    Andrew Coulter was seen 3 times for evaluation and treatment. Due to short treatment duration, no objective or functional changes were made. Patient was referred to Hand Therapy for specialized evaluation  Please see goal flow sheet from episode noted date below and initial evaluation for further information.  Patient is discharged from therapy and therapy episode is resolved as of 04/26/23.      Linked Episodes   Type: Episode: Status: Noted: Resolved: Last update: Updated by:   PHYSICAL THERAPY R elbow tendinopathy 20923 Active 3/29/2023  4/12/2023  8:03 AM Ember Lala PT      Comments:

## 2023-05-01 ENCOUNTER — THERAPY VISIT (OUTPATIENT)
Dept: OCCUPATIONAL THERAPY | Facility: CLINIC | Age: 43
End: 2023-05-01
Payer: COMMERCIAL

## 2023-05-01 DIAGNOSIS — M25.521 RIGHT ELBOW PAIN: Primary | ICD-10-CM

## 2023-05-01 PROCEDURE — 97165 OT EVAL LOW COMPLEX 30 MIN: CPT | Mod: GO | Performed by: OCCUPATIONAL THERAPIST

## 2023-05-01 PROCEDURE — 97110 THERAPEUTIC EXERCISES: CPT | Mod: GO | Performed by: OCCUPATIONAL THERAPIST

## 2023-05-01 PROCEDURE — 97140 MANUAL THERAPY 1/> REGIONS: CPT | Mod: GO | Performed by: OCCUPATIONAL THERAPIST

## 2023-05-01 PROCEDURE — 97035 APP MDLTY 1+ULTRASOUND EA 15: CPT | Mod: GO | Performed by: OCCUPATIONAL THERAPIST

## 2023-05-01 NOTE — PROGRESS NOTES
Hand Therapy Initial Evaluation    Current Date:  5/1/2023    Diagnosis: Right elbow pain  DOI: October 2022    Subjective:  Andrew Coulter is a 42 year old male.    Patient reports symptoms of the right elbow which occurred due to unknown. Since onset symptoms are Gradually getting better.  Cortisone shot 4/14/23  Current occupation is nurse manager    Answers for HPI/ROS submitted by the patient on 5/1/2023  Reason for Visit:: Right elbow  When problem began:: 10/12/2022  How problem occurred:: [place]  Number scale: 4/10  General health as reported by patient: good  Please check all that apply to your current or past medical history: none  Medical allergies: other  Surgeries: none  Medications you are currently taking: anti-inflammatory, high blood pressure medication  Occupation:: [employer]  What are your primary job tasks: computer work, lifting/carrying, prolonged sitting, prolonged standing    Occupational Profile Information:  Right hand dominant  Prior functional level:  no limitations  Patient reports symptoms of pain, stiffness/loss of motion and weakness/loss of strength  Special tests:  x-ray.    Previous treatment: PT  Barriers include:none  Mobility: No difficulty  Transportation: drives  Currently working in normal job without restrictions  Leisure activities/hobbies: hockey, gardening, yard work, lifting weights, eliptical, hiking, camping    Functional Outcome Measure:   Upper Extremity Functional Index Score:  SCORE:   Column Totals: /80: (P) 73   (A lower score indicates greater disability.)    Objective:  Pain Level (Scale 0-10)   5/1/2023   At Rest 0   With Use 2-5     Pain Description  Date 5/1/2023   Location elbow   Pain Quality Aching and Tender   Frequency intermittent     Pain is worst  daytime   Exacerbated by  use   Relieved by cold   Progression improving     Posture  Rounded Forward Shoulders and Patient's proximal musculature at shoulder appears imbalanced with tight pectoralis muscles,  subscapularis, upper trapezius, latissimus and weak scapular stabilizers.  This pattern contributes to overuse of distal musculature.    Sensation  WNL throughout all nerve distributions; per patient report    ROM  Pain Report: - none  + mild    ++ moderate    +++ severe   Elbow 5/1/2023   AROM (PROM) Right   Extension Full ROM   Flexion    Supination    Pronation      Resisted Testing  Pain Report:  - none    + mild    ++ moderate    +++ severe    5/1/2023   Elbow Extension -   Elbow Flexion -   Supination  -   Pronation -   Wrist Ext with RD, Elbow at side ++   Wrist Ext with UD, Elbow at side ++   Wrist Ext with RD, Elbow Ext ++   Wrist Ext with UD, Elbow Ext +   Wrist Flex with RD, Elbow at side -   Wrist Flex with UD, Elbow at side -   Wrist Flex with RD, Elbow Ext -   Wrist Flex with UD, Elbow Ext -   EDC with Elbow at side ++   EDC with Elbow Ext ++   Long Finger Test ++     Neural Tension Testing  RNT: Radial Neurodynamic Test (based on MAYLIN Fu's ULNT)   5/1/2023   0-5 Scale 3   Position:   0/5: Arm across abdomen in coronal plane  1/5: Depress shoulder, ER to neutral ABD shoulder to 45 degrees  2/5: IR shoulder to end range, keep elbow at 90 degrees  3/5: Extend elbow to 0 degrees  4/5: Fully pronate forearm  5/5: Flex wrist and fingers with UD  Notes:    (+) indicates beyond grade level but less than long term to next level    (-) indicates over long term to level    S1  onset/change of patient's symptoms    S2 definite stop point based on patient's discomfort level    Strength   (Measured in pounds)  Pain Report: - none  + mild    ++ moderate    +++ severe    5/1/2023 5/1/2023   Trials Left Right   1  2  3     Average 115# 85#+       5/1/2023 5/1/2023    Left Right   Elbow Ext 105# 45#++     Palpation  Pain Report:  - none    + mild    ++ moderate    +++ severe    5/1/2023   Triangular Interval -   Infraspinatus -   Teres Major -   Pec Major +   Pec Minor min   Proximal Triceps -   Spiral Groove -    Distal Triceps -   Anconeus +   ECRB at LEP ++   ECU at LEP ++   EDC at LEP ++   Radial Head ++   Extensor Wad ++   PIN Site -     Assessment:  Patient presents with symptoms consistent with diagnosis of right elbow pain, with conservative intervention.     Patient's limitations or Problem List includes:  Pain, Weakness, Decreased  and Tightness in musculature of the right elbow which interferes with the patient's ability to perform Self Care Tasks (dressing, eating, bathing, hygiene/toileting), Work Tasks, Recreational Activities, Household Chores and Driving  as compared to previous level of function.    Rehab Potential:  Excellent - Return to full activity, no limitations    Patient will benefit from skilled Occupational Therapy to increase flexibility and overall strength and decrease pain to return to previous activity level and resume normal daily tasks and to reach their rehab potential.    Barriers to Learning:  No barrier    Communication Issues:  Patient appears to be able to clearly communicate and understand verbal and written communication and follow directions correctly.    Chart Review: Simple history review with patient    Identified Performance Deficits: bathing/showering, toileting, dressing, health management and maintenance, home establishment and management, meal preparation and cleanup, shopping, sleep, work and leisure activities    Assessment of Occupational Performance:  5 or more Performance Deficits    Clinical Decision Making (Complexity): Low complexity    Treatment Explanation:  The following has been discussed with the patient:  RX ordered/plan of care  Anticipated outcomes  Possible risks and side effects    Plan:  Frequency:  1 X week, once daily  Duration:  for 2 months tapering to 1 X a month over 1 months    Treatment Plan:    Modalities:    US   Therapeutic Exercise:    AROM  Therapeutic Activities:  Functional activities   Neuromuscular re-ed:   Nerve Gliding and  Posture  Manual Techniques:   Friction massage and Myofascial release  Orthotic Fabrication:    Prefab prn  Self Care:    Self Care Tasks and Work Tasks    Discharge Plan:  Achieve all LTG.  Independent in home treatment program.  Reach maximal therapeutic benefit.    Home Program:   Exercise Name: Pectoralis Major Trigger Release, Sets: 1 - Sessions: 1x every other day, Notes: 60 sec each knot  Exercise Name: Pectoralis Minor Trigger Release, Sets: 1 - Sessions: 1x every other day, Notes: Also roll to the side and do side muscles.  Exercise Name: Latissimus/Teres/Serratus/Subscapularis Trigger Release, Sets: 1 - Sessions: 1x every other day, Notes: Make sure you go into tricep a bit too.  60 sec hold on each knot  Exercise Name: Rolling upper back, Sets: 1 - Sessions: 1x every other day  Exercise Name: Spinal extensions, Sets: 1 - Reps: 3-5 reps at each vertebral section - Sessions: 1x every other day  Exercise Name: Foam Roller Stretch: Pectoralis Major, Sets: 1 - Sessions: 1x every other day, Notes: 1-5 min  Exercise Name: Foam Roller stretch: Pectoralis Minor - Sessions: 1x every other day, Notes: 1-5 minutes  Exercise Name: Self Elbow Mobilization, Trigger Point Massage Over Radial Head - Reps: hold each knot for 30-60 sec - Sessions: 1x every other day, Notes: #1grip with hand or use a golf ball in top part of forearm wad and on bottom forearm wad.  Move wrist back and forth and palm up and palm down  #2 Slowly and deeply roll ball up and down top, side, and bottom of forearm.  Basically you are trying to find all the tight and sore spots  Exercise Name: Friction Massage - Sessions: 1-2 x/day, Notes: one direction on side of bone  icing 3-5x/day  Exercise Name: Nerve Gliding Proximal Radial, Sets: 1 - Reps: 5-10 - Sessions: 4    Next Visit:    MFR  Nerve gliding

## 2023-05-08 ENCOUNTER — THERAPY VISIT (OUTPATIENT)
Dept: OCCUPATIONAL THERAPY | Facility: CLINIC | Age: 43
End: 2023-05-08
Payer: COMMERCIAL

## 2023-05-08 DIAGNOSIS — M25.521 RIGHT ELBOW PAIN: Primary | ICD-10-CM

## 2023-05-08 PROCEDURE — 97140 MANUAL THERAPY 1/> REGIONS: CPT | Mod: GO | Performed by: OCCUPATIONAL THERAPIST

## 2023-05-08 PROCEDURE — 97035 APP MDLTY 1+ULTRASOUND EA 15: CPT | Mod: GO | Performed by: OCCUPATIONAL THERAPIST

## 2023-05-08 NOTE — PROGRESS NOTES
SOAP note objective information for 5/8/2023.    Please refer to the daily flowsheet for treatment today, total treatment time and time spent performing 1:1 timed codes.         Objective:  Pain Level (Scale 0-10)   5/1/2023 5/8/2023   At Rest 0 1-2   With Use 2-5 5-6     Pain Description  Date 5/1/2023    Location elbow    Pain Quality Aching and Tender    Frequency intermittent      Pain is worst  daytime    Exacerbated by  use    Relieved by cold    Progression improving      Posture  Rounded Forward Shoulders and Patient's proximal musculature at shoulder appears imbalanced with tight pectoralis muscles, subscapularis, upper trapezius, latissimus and weak scapular stabilizers.  This pattern contributes to overuse of distal musculature.    Sensation  WNL throughout all nerve distributions; per patient report    ROM  Pain Report: - none  + mild    ++ moderate    +++ severe   Elbow 5/1/2023   AROM (PROM) Right   Extension Full ROM   Flexion    Supination    Pronation      Resisted Testing  Pain Report:  - none    + mild    ++ moderate    +++ severe    5/1/2023    Elbow Extension -    Elbow Flexion -    Supination  -    Pronation -    Wrist Ext with RD, Elbow at side ++    Wrist Ext with UD, Elbow at side ++    Wrist Ext with RD, Elbow Ext ++    Wrist Ext with UD, Elbow Ext +    Wrist Flex with RD, Elbow at side -    Wrist Flex with UD, Elbow at side -    Wrist Flex with RD, Elbow Ext -    Wrist Flex with UD, Elbow Ext -    EDC with Elbow at side ++    EDC with Elbow Ext ++    Long Finger Test ++      Neural Tension Testing  RNT: Radial Neurodynamic Test (based on MAYLIN Fu's ULNT)   5/1/2023    0-5 Scale 3    Position:   0/5: Arm across abdomen in coronal plane  1/5: Depress shoulder, ER to neutral ABD shoulder to 45 degrees  2/5: IR shoulder to end range, keep elbow at 90 degrees  3/5: Extend elbow to 0 degrees  4/5: Fully pronate forearm  5/5: Flex wrist and fingers with UD  Notes:    (+) indicates beyond grade  level but less than FPC to next level    (-) indicates over FPC to level    S1  onset/change of patient's symptoms    S2 definite stop point based on patient's discomfort level    Strength   (Measured in pounds)  Pain Report: - none  + mild    ++ moderate    +++ severe    5/1/2023 5/1/2023   Trials Left Right   1  2  3     Average 115# 85#+       5/1/2023 5/1/2023    Left Right   Elbow Ext 105# 45#++     Palpation  Pain Report:  - none    + mild    ++ moderate    +++ severe    5/1/2023 5/8/2023   Triangular Interval -    Infraspinatus -    Teres Major -    Pec Major +    Pec Minor min    Proximal Triceps -    Spiral Groove -    Distal Triceps -    Anconeus +    ECRB at LEP ++ ++   ECU at LEP ++ ++   EDC at LEP ++ ++   Radial Head ++ ++   Extensor Wad ++ ++   PIN Site -          Treatment Plan:    Modalities:    US   Therapeutic Exercise:    AROM  Therapeutic Activities:  Functional activities   Neuromuscular re-ed:   Nerve Gliding and Posture  Manual Techniques:   Friction massage and Myofascial release  Orthotic Fabrication:    Prefab prn  Self Care:    Self Care Tasks and Work Tasks    Discharge Plan:  Achieve all LTG.  Independent in home treatment program.  Reach maximal therapeutic benefit.    Home Program:   Exercise Name: Pectoralis Major Trigger Release, Sets: 1 - Sessions: 1x every other day, Notes: 60 sec each knot  Exercise Name: Pectoralis Minor Trigger Release, Sets: 1 - Sessions: 1x every other day, Notes: Also roll to the side and do side muscles.  Exercise Name: Latissimus/Teres/Serratus/Subscapularis Trigger Release, Sets: 1 - Sessions: 1x every other day, Notes: Make sure you go into tricep a bit too.  60 sec hold on each knot  Exercise Name: Rolling upper back, Sets: 1 - Sessions: 1x every other day  Exercise Name: Spinal extensions, Sets: 1 - Reps: 3-5 reps at each vertebral section - Sessions: 1x every other day  Exercise Name: Foam Roller Stretch: Pectoralis Major, Sets: 1 -  Sessions: 1x every other day, Notes: 1-5 min  Exercise Name: Foam Roller stretch: Pectoralis Minor - Sessions: 1x every other day, Notes: 1-5 minutes  Exercise Name: Self Elbow Mobilization, Trigger Point Massage Over Radial Head - Reps: hold each knot for 30-60 sec - Sessions: 1x every other day, Notes: #1grip with hand or use a golf ball in top part of forearm wad and on bottom forearm wad.  Move wrist back and forth and palm up and palm down  #2 Slowly and deeply roll ball up and down top, side, and bottom of forearm.  Basically you are trying to find all the tight and sore spots  Exercise Name: Friction Massage - Sessions: 1-2 x/day, Notes: one direction on side of bone  icing 3-5x/day  Exercise Name: Nerve Gliding Proximal Radial, Sets: 1 - Reps: 5-10 - Sessions: 4      5/8/2023  More frequent icing  Compression sleeves on elbow to help with icing and at HS    Next Visit:  US  MFR  Nerve gliding

## 2023-05-17 NOTE — PROGRESS NOTES
SOAP note objective information for 5/23/2023.    Please refer to the daily flowsheet for treatment today, total treatment time and time spent performing 1:1 timed codes.         Objective:  Pain Level (Scale 0-10)   5/1/2023 5/8/2023 5/23/2023   At Rest 0 1-2 0-1   With Use 2-5 5-6 1-4     Pain Description  Date 5/1/2023    Location elbow    Pain Quality Aching and Tender    Frequency intermittent      Pain is worst  daytime    Exacerbated by  use    Relieved by cold    Progression improving      Posture  Rounded Forward Shoulders and Patient's proximal musculature at shoulder appears imbalanced with tight pectoralis muscles, subscapularis, upper trapezius, latissimus and weak scapular stabilizers.  This pattern contributes to overuse of distal musculature.    Sensation  WNL throughout all nerve distributions; per patient report    ROM  Pain Report: - none  + mild    ++ moderate    +++ severe   Elbow 5/1/2023   AROM (PROM) Right   Extension Full ROM   Flexion    Supination    Pronation      Resisted Testing  Pain Report:  - none    + mild    ++ moderate    +++ severe    5/1/2023    Elbow Extension -    Elbow Flexion -    Supination  -    Pronation -    Wrist Ext with RD, Elbow at side ++    Wrist Ext with UD, Elbow at side ++    Wrist Ext with RD, Elbow Ext ++    Wrist Ext with UD, Elbow Ext +    Wrist Flex with RD, Elbow at side -    Wrist Flex with UD, Elbow at side -    Wrist Flex with RD, Elbow Ext -    Wrist Flex with UD, Elbow Ext -    EDC with Elbow at side ++    EDC with Elbow Ext ++    Long Finger Test ++      Neural Tension Testing  RNT: Radial Neurodynamic Test (based on MAYLIN Fu's ULNT)   5/1/2023    0-5 Scale 3    Position:   0/5: Arm across abdomen in coronal plane  1/5: Depress shoulder, ER to neutral ABD shoulder to 45 degrees  2/5: IR shoulder to end range, keep elbow at 90 degrees  3/5: Extend elbow to 0 degrees  4/5: Fully pronate forearm  5/5: Flex wrist and fingers with UD  Notes:    (+)  indicates beyond grade level but less than retirement to next level    (-) indicates over retirement to level    S1  onset/change of patient's symptoms    S2 definite stop point based on patient's discomfort level    Strength   (Measured in pounds)  Pain Report: - none  + mild    ++ moderate    +++ severe    5/1/2023 5/1/2023 5/23/2023   Trials Left Right Right   1  2  3      Average 115# 85#+ 85#+       5/1/2023 5/1/2023 5/23/2023    Left Right Right   Elbow Ext 105# 45#++ 71#++     Palpation  Pain Report:  - none    + mild    ++ moderate    +++ severe    5/1/2023 5/8/2023 5/23/2023   Triangular Interval -     Infraspinatus -     Teres Major -     Pec Major +     Pec Minor min     Proximal Triceps -     Spiral Groove -     Distal Triceps -     Anconeus +     ECRB at LEP ++ ++ +   ECU at LEP ++ ++ +   EDC at LEP ++ ++ +   Radial Head ++ ++ +   Extensor Wad ++ ++ +   PIN Site -           Treatment Plan:    Modalities:    US   Therapeutic Exercise:    AROM  Therapeutic Activities:  Functional activities   Neuromuscular re-ed:   Nerve Gliding and Posture  Manual Techniques:   Friction massage and Myofascial release  Orthotic Fabrication:    Prefab prn  Self Care:    Self Care Tasks and Work Tasks    Discharge Plan:  Achieve all LTG.  Independent in home treatment program.  Reach maximal therapeutic benefit.    Home Program:   Exercise Name: Pectoralis Major Trigger Release, Sets: 1 - Sessions: 1x every other day, Notes: 60 sec each knot  Exercise Name: Pectoralis Minor Trigger Release, Sets: 1 - Sessions: 1x every other day, Notes: Also roll to the side and do side muscles.  Exercise Name: Latissimus/Teres/Serratus/Subscapularis Trigger Release, Sets: 1 - Sessions: 1x every other day, Notes: Make sure you go into tricep a bit too.  60 sec hold on each knot  Exercise Name: Rolling upper back, Sets: 1 - Sessions: 1x every other day  Exercise Name: Spinal extensions, Sets: 1 - Reps: 3-5 reps at each vertebral section -  Sessions: 1x every other day  Exercise Name: Foam Roller Stretch: Pectoralis Major, Sets: 1 - Sessions: 1x every other day, Notes: 1-5 min  Exercise Name: Foam Roller stretch: Pectoralis Minor - Sessions: 1x every other day, Notes: 1-5 minutes  Exercise Name: Self Elbow Mobilization, Trigger Point Massage Over Radial Head - Reps: hold each knot for 30-60 sec - Sessions: 1x every other day, Notes: #1grip with hand or use a golf ball in top part of forearm wad and on bottom forearm wad.  Move wrist back and forth and palm up and palm down  #2 Slowly and deeply roll ball up and down top, side, and bottom of forearm.  Basically you are trying to find all the tight and sore spots  Exercise Name: Friction Massage - Sessions: 1-2 x/day, Notes: one direction on side of bone  icing 3-5x/day  Exercise Name: Nerve Gliding Proximal Radial, Sets: 1 - Reps: 5-10 - Sessions: 4      5/8/2023  More frequent icing  Compression sleeves on elbow to help with icing and at     5/23/2023  k-tape    Next Visit:  US  MFR  Nerve gliding

## 2023-05-23 ENCOUNTER — THERAPY VISIT (OUTPATIENT)
Dept: OCCUPATIONAL THERAPY | Facility: CLINIC | Age: 43
End: 2023-05-23
Payer: COMMERCIAL

## 2023-05-23 DIAGNOSIS — M25.521 RIGHT ELBOW PAIN: Primary | ICD-10-CM

## 2023-05-23 PROCEDURE — 97035 APP MDLTY 1+ULTRASOUND EA 15: CPT | Mod: GO | Performed by: OCCUPATIONAL THERAPIST

## 2023-05-23 PROCEDURE — 97140 MANUAL THERAPY 1/> REGIONS: CPT | Mod: GO | Performed by: OCCUPATIONAL THERAPIST

## 2023-05-25 ENCOUNTER — LAB (OUTPATIENT)
Dept: LAB | Facility: CLINIC | Age: 43
End: 2023-05-25
Payer: COMMERCIAL

## 2023-05-25 DIAGNOSIS — I10 BENIGN ESSENTIAL HYPERTENSION: ICD-10-CM

## 2023-05-25 LAB
ANION GAP SERPL CALCULATED.3IONS-SCNC: 14 MMOL/L (ref 7–15)
BUN SERPL-MCNC: 17.6 MG/DL (ref 6–20)
CALCIUM SERPL-MCNC: 9.4 MG/DL (ref 8.6–10)
CHLORIDE SERPL-SCNC: 101 MMOL/L (ref 98–107)
CREAT SERPL-MCNC: 1.1 MG/DL (ref 0.67–1.17)
DEPRECATED HCO3 PLAS-SCNC: 23 MMOL/L (ref 22–29)
GFR SERPL CREATININE-BSD FRML MDRD: 86 ML/MIN/1.73M2
GLUCOSE SERPL-MCNC: 102 MG/DL (ref 70–99)
POTASSIUM SERPL-SCNC: 4.2 MMOL/L (ref 3.4–5.3)
SODIUM SERPL-SCNC: 138 MMOL/L (ref 136–145)

## 2023-05-25 PROCEDURE — 80048 BASIC METABOLIC PNL TOTAL CA: CPT

## 2023-05-25 PROCEDURE — 36415 COLL VENOUS BLD VENIPUNCTURE: CPT

## 2023-05-31 ENCOUNTER — THERAPY VISIT (OUTPATIENT)
Dept: OCCUPATIONAL THERAPY | Facility: CLINIC | Age: 43
End: 2023-05-31
Payer: COMMERCIAL

## 2023-05-31 DIAGNOSIS — M25.521 RIGHT ELBOW PAIN: Primary | ICD-10-CM

## 2023-05-31 PROBLEM — M77.10 LATERAL EPICONDYLITIS, UNSPECIFIED LATERALITY: Status: ACTIVE | Noted: 2023-03-29

## 2023-05-31 PROCEDURE — 97035 APP MDLTY 1+ULTRASOUND EA 15: CPT | Mod: GO | Performed by: OCCUPATIONAL THERAPIST

## 2023-05-31 PROCEDURE — 97140 MANUAL THERAPY 1/> REGIONS: CPT | Mod: GO | Performed by: OCCUPATIONAL THERAPIST

## 2023-05-31 NOTE — PROGRESS NOTES
SOAP note objective information for 5/31/2023.    Please refer to the daily flowsheet for treatment today, total treatment time and time spent performing 1:1 timed codes.         Objective:  Pain Level (Scale 0-10)   5/1/2023 5/8/2023 5/23/2023 5/31/2023   At Rest 0 1-2 0-1    With Use 2-5 5-6 1-4 5     Pain Description  Date 5/1/2023    Location elbow    Pain Quality Aching and Tender    Frequency intermittent      Pain is worst  daytime    Exacerbated by  use    Relieved by cold    Progression improving      Posture  Rounded Forward Shoulders and Patient's proximal musculature at shoulder appears imbalanced with tight pectoralis muscles, subscapularis, upper trapezius, latissimus and weak scapular stabilizers.  This pattern contributes to overuse of distal musculature.    Sensation  WNL throughout all nerve distributions; per patient report    ROM  Pain Report: - none  + mild    ++ moderate    +++ severe   Elbow 5/1/2023   AROM (PROM) Right   Extension Full ROM   Flexion    Supination    Pronation      Resisted Testing  Pain Report:  - none    + mild    ++ moderate    +++ severe    5/1/2023    Elbow Extension -    Elbow Flexion -    Supination  -    Pronation -    Wrist Ext with RD, Elbow at side ++    Wrist Ext with UD, Elbow at side ++    Wrist Ext with RD, Elbow Ext ++    Wrist Ext with UD, Elbow Ext +    Wrist Flex with RD, Elbow at side -    Wrist Flex with UD, Elbow at side -    Wrist Flex with RD, Elbow Ext -    Wrist Flex with UD, Elbow Ext -    EDC with Elbow at side ++    EDC with Elbow Ext ++    Long Finger Test ++      Neural Tension Testing  RNT: Radial Neurodynamic Test (based on MAYLIN Fu's ULNT)   5/1/2023    0-5 Scale 3    Position:   0/5: Arm across abdomen in coronal plane  1/5: Depress shoulder, ER to neutral ABD shoulder to 45 degrees  2/5: IR shoulder to end range, keep elbow at 90 degrees  3/5: Extend elbow to 0 degrees  4/5: Fully pronate forearm  5/5: Flex wrist and fingers with  UD  Notes:    (+) indicates beyond grade level but less than [facility] to next level    (-) indicates over [facility] to level    S1  onset/change of patient's symptoms    S2 definite stop point based on patient's discomfort level    Strength   (Measured in pounds)  Pain Report: - none  + mild    ++ moderate    +++ severe    5/1/2023 5/1/2023 5/23/2023   Trials Left Right Right   1  2  3      Average 115# 85#+ 85#+       5/1/2023 5/1/2023 5/23/2023    Left Right Right   Elbow Ext 105# 45#++ 71#++     Palpation  Pain Report:  - none    + mild    ++ moderate    +++ severe    5/1/2023 5/8/2023 5/23/2023 5/31/2023   Triangular Interval -      Infraspinatus -      Teres Major -      Pec Major +      Pec Minor min      Proximal Triceps -      Spiral Groove -      Distal Triceps -      Anconeus +      ECRB at LEP ++ ++ + +   ECU at LEP ++ ++ + +   EDC at LEP ++ ++ + +   Radial Head ++ ++ + Min   Extensor Wad ++ ++ + min   PIN Site -            Treatment Plan:    Modalities:    US   Therapeutic Exercise:    AROM  Therapeutic Activities:  Functional activities   Neuromuscular re-ed:   Nerve Gliding and Posture  Manual Techniques:   Friction massage and Myofascial release  Orthotic Fabrication:    Prefab prn  Self Care:    Self Care Tasks and Work Tasks    Discharge Plan:  Achieve all LTG.  Independent in home treatment program.  Reach maximal therapeutic benefit.    Home Program:   Exercise Name: Pectoralis Major Trigger Release, Sets: 1 - Sessions: 1x every other day, Notes: 60 sec each knot  Exercise Name: Pectoralis Minor Trigger Release, Sets: 1 - Sessions: 1x every other day, Notes: Also roll to the side and do side muscles.  Exercise Name: Latissimus/Teres/Serratus/Subscapularis Trigger Release, Sets: 1 - Sessions: 1x every other day, Notes: Make sure you go into tricep a bit too.  60 sec hold on each knot  Exercise Name: Rolling upper back, Sets: 1 - Sessions: 1x every other day  Exercise Name: Spinal extensions,  Sets: 1 - Reps: 3-5 reps at each vertebral section - Sessions: 1x every other day  Exercise Name: Foam Roller Stretch: Pectoralis Major, Sets: 1 - Sessions: 1x every other day, Notes: 1-5 min  Exercise Name: Foam Roller stretch: Pectoralis Minor - Sessions: 1x every other day, Notes: 1-5 minutes  Exercise Name: Self Elbow Mobilization, Trigger Point Massage Over Radial Head - Reps: hold each knot for 30-60 sec - Sessions: 1x every other day, Notes: #1grip with hand or use a golf ball in top part of forearm wad and on bottom forearm wad.  Move wrist back and forth and palm up and palm down  #2 Slowly and deeply roll ball up and down top, side, and bottom of forearm.  Basically you are trying to find all the tight and sore spots  Exercise Name: Friction Massage - Sessions: 1-2 x/day, Notes: one direction on side of bone  icing 3-5x/day  Exercise Name: Nerve Gliding Proximal Radial, Sets: 1 - Reps: 5-10 - Sessions: 4      5/8/2023  More frequent icing  Compression sleeves on elbow to help with icing and at     5/23/2023  k-tape    Next Visit:  US  MFR  Nerve gliding

## 2023-06-05 NOTE — PROGRESS NOTES
SOAP note objective information for 6/7/2023.    Please refer to the daily flowsheet for treatment today, total treatment time and time spent performing 1:1 timed codes.         Objective:  Pain Level (Scale 0-10)   5/1/2023 5/8/2023 5/23/2023 5/31/2023 6/7/2023   At Rest 0 1-2 0-1     With Use 2-5 5-6 1-4 5 3     Pain Description  Date 5/1/2023    Location elbow    Pain Quality Aching and Tender    Frequency intermittent      Pain is worst  daytime    Exacerbated by  use    Relieved by cold    Progression improving      Posture  Rounded Forward Shoulders and Patient's proximal musculature at shoulder appears imbalanced with tight pectoralis muscles, subscapularis, upper trapezius, latissimus and weak scapular stabilizers.  This pattern contributes to overuse of distal musculature.    Sensation  WNL throughout all nerve distributions; per patient report    ROM  Pain Report: - none  + mild    ++ moderate    +++ severe   Elbow 5/1/2023   AROM (PROM) Right   Extension Full ROM   Flexion    Supination    Pronation      Resisted Testing  Pain Report:  - none    + mild    ++ moderate    +++ severe    5/1/2023    Elbow Extension -    Elbow Flexion -    Supination  -    Pronation -    Wrist Ext with RD, Elbow at side ++    Wrist Ext with UD, Elbow at side ++    Wrist Ext with RD, Elbow Ext ++    Wrist Ext with UD, Elbow Ext +    Wrist Flex with RD, Elbow at side -    Wrist Flex with UD, Elbow at side -    Wrist Flex with RD, Elbow Ext -    Wrist Flex with UD, Elbow Ext -    EDC with Elbow at side ++    EDC with Elbow Ext ++    Long Finger Test ++      Neural Tension Testing  RNT: Radial Neurodynamic Test (based on MAYLIN Fu's ULNT)   5/1/2023    0-5 Scale 3    Position:   0/5: Arm across abdomen in coronal plane  1/5: Depress shoulder, ER to neutral ABD shoulder to 45 degrees  2/5: IR shoulder to end range, keep elbow at 90 degrees  3/5: Extend elbow to 0 degrees  4/5: Fully pronate forearm  5/5: Flex wrist and fingers  with UD  Notes:    (+) indicates beyond grade level but less than penitentiary to next level    (-) indicates over penitentiary to level    S1  onset/change of patient's symptoms    S2 definite stop point based on patient's discomfort level    Strength   (Measured in pounds)  Pain Report: - none  + mild    ++ moderate    +++ severe    5/1/2023 5/1/2023 5/23/2023   Trials Left Right Right   1  2  3      Average 115# 85#+ 85#+       5/1/2023 5/1/2023 5/23/2023    Left Right Right   Elbow Ext 105# 45#++ 71#++     Palpation  Pain Report:  - none    + mild    ++ moderate    +++ severe    5/1/2023 5/8/2023 5/23/2023 5/31/2023 6/7/2023   Triangular Interval -       Infraspinatus -       Teres Major -       Pec Major +       Pec Minor min       Proximal Triceps -       Spiral Groove -       Distal Triceps -       Anconeus +       ECRB at LEP ++ ++ + + +   ECU at LEP ++ ++ + + -   EDC at LEP ++ ++ + + +   Radial Head ++ ++ + Min -   Extensor Wad ++ ++ + min -   PIN Site -             Treatment Plan:    Modalities:    US   Therapeutic Exercise:    AROM  Therapeutic Activities:  Functional activities   Neuromuscular re-ed:   Nerve Gliding and Posture  Manual Techniques:   Friction massage and Myofascial release  Orthotic Fabrication:    Prefab prn  Self Care:    Self Care Tasks and Work Tasks    Discharge Plan:  Achieve all LTG.  Independent in home treatment program.  Reach maximal therapeutic benefit.    Home Program:   Exercise Name: Pectoralis Major Trigger Release, Sets: 1 - Sessions: 1x every other day, Notes: 60 sec each knot  Exercise Name: Pectoralis Minor Trigger Release, Sets: 1 - Sessions: 1x every other day, Notes: Also roll to the side and do side muscles.  Exercise Name: Latissimus/Teres/Serratus/Subscapularis Trigger Release, Sets: 1 - Sessions: 1x every other day, Notes: Make sure you go into tricep a bit too.  60 sec hold on each knot  Exercise Name: Rolling upper back, Sets: 1 - Sessions: 1x every other  day  Exercise Name: Spinal extensions, Sets: 1 - Reps: 3-5 reps at each vertebral section - Sessions: 1x every other day  Exercise Name: Foam Roller Stretch: Pectoralis Major, Sets: 1 - Sessions: 1x every other day, Notes: 1-5 min  Exercise Name: Foam Roller stretch: Pectoralis Minor - Sessions: 1x every other day, Notes: 1-5 minutes  Exercise Name: Self Elbow Mobilization, Trigger Point Massage Over Radial Head - Reps: hold each knot for 30-60 sec - Sessions: 1x every other day, Notes: #1grip with hand or use a golf ball in top part of forearm wad and on bottom forearm wad.  Move wrist back and forth and palm up and palm down  #2 Slowly and deeply roll ball up and down top, side, and bottom of forearm.  Basically you are trying to find all the tight and sore spots  Exercise Name: Friction Massage - Sessions: 1-2 x/day, Notes: one direction on side of bone  icing 3-5x/day  Exercise Name: Nerve Gliding Proximal Radial, Sets: 1 - Reps: 5-10 - Sessions: 4      5/8/2023  More frequent icing  Compression sleeves on elbow to help with icing and at     5/23/2023  k-tape    Next Visit:  US  MFR  Nerve gliding

## 2023-06-07 ENCOUNTER — THERAPY VISIT (OUTPATIENT)
Dept: OCCUPATIONAL THERAPY | Facility: CLINIC | Age: 43
End: 2023-06-07
Payer: COMMERCIAL

## 2023-06-07 DIAGNOSIS — M25.521 RIGHT ELBOW PAIN: Primary | ICD-10-CM

## 2023-06-07 DIAGNOSIS — M77.10 LATERAL EPICONDYLITIS, UNSPECIFIED LATERALITY: ICD-10-CM

## 2023-06-07 PROCEDURE — 97140 MANUAL THERAPY 1/> REGIONS: CPT | Mod: GO | Performed by: OCCUPATIONAL THERAPIST

## 2023-06-07 PROCEDURE — 97035 APP MDLTY 1+ULTRASOUND EA 15: CPT | Mod: GO | Performed by: OCCUPATIONAL THERAPIST

## 2023-06-11 DIAGNOSIS — F90.9 ATTENTION DEFICIT HYPERACTIVITY DISORDER (ADHD), UNSPECIFIED ADHD TYPE: ICD-10-CM

## 2023-06-12 RX ORDER — BUPROPION HYDROCHLORIDE 150 MG/1
TABLET ORAL
Qty: 30 TABLET | Refills: 2 | Status: SHIPPED | OUTPATIENT
Start: 2023-06-12 | End: 2023-09-11

## 2023-06-12 NOTE — TELEPHONE ENCOUNTER
"Last visit 3/24/23    Request for medication refill:  buPROPion (WELLBUTRIN XL) 150 MG 24 hr tablet  Providers if patient needs an appointment and you are willing to give a one month supply please refill for one month and  send a letter/MyChart using \".SMILLIMITEDREFILL\" .smillimited and route chart to \"P [facility] \" (Giving one month refill in non controlled medications is strongly recommended before denial)    If refill has been denied, meaning absolutely no refills without visit, please complete the smart phrase \".smirxrefuse\" and route it to the \"P [facility] MED REFILLS\"  pool to inform the patient and the pharmacy.    Tramaine Ferro MA        "

## 2023-06-21 ENCOUNTER — THERAPY VISIT (OUTPATIENT)
Dept: OCCUPATIONAL THERAPY | Facility: CLINIC | Age: 43
End: 2023-06-21
Payer: COMMERCIAL

## 2023-06-21 DIAGNOSIS — M25.521 RIGHT ELBOW PAIN: Primary | ICD-10-CM

## 2023-06-21 PROCEDURE — 97035 APP MDLTY 1+ULTRASOUND EA 15: CPT | Mod: GO | Performed by: OCCUPATIONAL THERAPIST

## 2023-06-21 PROCEDURE — 97140 MANUAL THERAPY 1/> REGIONS: CPT | Mod: GO | Performed by: OCCUPATIONAL THERAPIST

## 2023-06-21 NOTE — PROGRESS NOTES
06/21/23 0500   Appointment Info   Treating Provider Opal Redmond, OTR, CHT   Total/Authorized Visits 8 (POC)   Visits Used 6   Medical Diagnosis Right LEP   Progress Note/Certification   Onset of Illness/Injury or Date of Surgery 10/12/22   Therapy Frequency 1x/week   Predicted Duration 2 months   Progress Note Due Date 05/29/23       Present No   OT Goal 1   Goal Identifier house hold chores   Goal Description no difficulty using vacuum   Rationale   ([place] with ADLs)   Goal Progress improivng   Target Date 06/20/23   Subjective Report   Subjective Report Pain is mostly right on LEP.  Not as much tightness in forearm.   Objective Measures   Objective Measures Objective Measure 1;Objective Measure 2;Objective Measure 3;Objective Measure 4;Objective Measure 5;Objective Measure 6   Objective Measure 1   Objective Measure ECRB/EDC +   Objective Measure 2   Objective Measure Pain 1-2   Objective Measure 3   Objective Measure   at side 100#-;  extended 85#++   Objective Measure 4   Objective Measure Resisted wrist elbow at side tender; wrist resisted elbow extended +   Objective Measure 5   Objective Measure resisted long finger -   OT Modalities   OT Modalities Ultrasound    Ultrasound   Ultrasound, Minutes (40166) 10 Minutes   Treatment Detail lep; BR; extensor wad   Ultrasound -Type (does not include 3-5 min prep/cleanup time) Continuous   Intensity .8   Duration (does not include the 3-5 min set up/clean up time) 10 min   Frequency 3 MHz   Location lep   Positioning sitting   Treatment Interventions (OT)   Interventions Manual Therapy;Neuromuscular Re-education;Therapeutic Procedure/Exercise   Neuromuscular Re-education   Neuromuscular Re-ed Minutes (97587) 2   Skilled Intervention applied 2 strips of ktape parallel to each other over wad and lep and BR   Patient Response/Progress good   Manual Therapy   Manual Therapy Minutes (84055) 15   Manual Therapy 1 BR; lep; extensor wad    Skilled Intervention used t-tool   Patient Response/Progress good   Plan   Plan for next session US; MFR; traction   Total Session Time   Timed Code Treatment Minutes 27   Total Treatment Time (sum of timed and untimed services) 27     Objective:  Pain Level (Scale 0-10)    5/1/2023 5/8/2023 5/23/2023 5/31/2023 6/7/2023   At Rest 0 1-2 0-1       With Use 2-5 5-6 1-4 5 3      Pain Description  Date 5/1/2023     Location elbow     Pain Quality Aching and Tender     Frequency intermittent       Pain is worst  daytime     Exacerbated by  use     Relieved by cold     Progression improving        Posture  Rounded Forward Shoulders and Patient's proximal musculature at shoulder appears imbalanced with tight pectoralis muscles, subscapularis, upper trapezius, latissimus and weak scapular stabilizers.  This pattern contributes to overuse of distal musculature.     Sensation  WNL throughout all nerve distributions; per patient report     ROM  Pain Report: - none  + mild    ++ moderate    +++ severe   Elbow 5/1/2023   AROM (PROM) Right   Extension Full ROM   Flexion     Supination     Pronation        Resisted Testing  Pain Report:  - none    + mild    ++ moderate    +++ severe     5/1/2023     Elbow Extension -     Elbow Flexion -     Supination  -     Pronation -     Wrist Ext with RD, Elbow at side ++     Wrist Ext with UD, Elbow at side ++     Wrist Ext with RD, Elbow Ext ++     Wrist Ext with UD, Elbow Ext +     Wrist Flex with RD, Elbow at side -     Wrist Flex with UD, Elbow at side -     Wrist Flex with RD, Elbow Ext -     Wrist Flex with UD, Elbow Ext -     EDC with Elbow at side ++     EDC with Elbow Ext ++     Long Finger Test ++        Neural Tension Testing  RNT: Radial Neurodynamic Test (based on MAYLIN Fu's ULNT)    5/1/2023 6/21/2023   0-5 Scale 3  5   Position:   0/5: Arm across abdomen in coronal plane  1/5: Depress shoulder, ER to neutral ABD shoulder to 45 degrees  2/5: IR shoulder to end range, keep  elbow at 90 degrees  3/5: Extend elbow to 0 degrees  4/5: Fully pronate forearm  5/5: Flex wrist and fingers with UD  Notes:    (+) indicates beyond grade level but less than [facility]CI to next level    (-) indicates over FCI to level    S1  onset/change of patient's symptoms    S2 definite stop point based on patient's discomfort level     Strength   (Measured in pounds)  Pain Report: - none  + mild    ++ moderate    +++ severe    5/1/2023 5/1/2023 5/23/2023 6/21/2023   Trials Left Right Right Right   1  2  3          Average 115# 85#+ 85#+ 100#-        5/1/2023 5/1/2023 5/23/2023 6/21/2023     Left Right Right Right   Elbow Ext 105# 45#++ 71#++ 85#++      Palpation  Pain Report:  - none    + mild    ++ moderate    +++ severe     5/1/2023 5/8/2023 5/23/2023 5/31/2023 6/7/2023   Triangular Interval -           Infraspinatus -           Teres Major -           Pec Major +           Pec Minor min           Proximal Triceps -           Spiral Groove -           Distal Triceps -           Anconeus +           ECRB at LEP ++ ++ + + +   ECU at LEP ++ ++ + + -   EDC at LEP ++ ++ + + +   Radial Head ++ ++ + Min -   Extensor Wad ++ ++ + min -   PIN Site -                   6/21/2023       Triangular Interval        Infraspinatus        Teres Major        Pec Major        Pec Minor        Proximal Triceps        Spiral Groove        Distal Triceps        Anconeus        ECRB at LEP +       ECU at LEP        EDC at LEP +       Radial Head -       Extensor Wad -       PIN Site -               Treatment Plan:    Modalities:    US   Therapeutic Exercise:    AROM  Therapeutic Activities:  Functional activities   Neuromuscular re-ed:               Nerve Gliding and Posture  Manual Techniques:               Friction massage and Myofascial release  Orthotic Fabrication:    Prefab prn  Self Care:    Self Care Tasks and Work Tasks     Discharge Plan:  Achieve all LTG.  Independent in home treatment program.  Reach maximal  therapeutic benefit.     Home Program:   Exercise Name: Pectoralis Major Trigger Release, Sets: 1 - Sessions: 1x every other day, Notes: 60 sec each knot  Exercise Name: Pectoralis Minor Trigger Release, Sets: 1 - Sessions: 1x every other day, Notes: Also roll to the side and do side muscles.  Exercise Name: Latissimus/Teres/Serratus/Subscapularis Trigger Release, Sets: 1 - Sessions: 1x every other day, Notes: Make sure you go into tricep a bit too.  60 sec hold on each knot  Exercise Name: Rolling upper back, Sets: 1 - Sessions: 1x every other day  Exercise Name: Spinal extensions, Sets: 1 - Reps: 3-5 reps at each vertebral section - Sessions: 1x every other day  Exercise Name: Foam Roller Stretch: Pectoralis Major, Sets: 1 - Sessions: 1x every other day, Notes: 1-5 min  Exercise Name: Foam Roller stretch: Pectoralis Minor - Sessions: 1x every other day, Notes: 1-5 minutes  Exercise Name: Self Elbow Mobilization, Trigger Point Massage Over Radial Head - Reps: hold each knot for 30-60 sec - Sessions: 1x every other day, Notes: #1grip with hand or use a golf ball in top part of forearm wad and on bottom forearm wad.  Move wrist back and forth and palm up and palm down  #2 Slowly and deeply roll ball up and down top, side, and bottom of forearm.  Basically you are trying to find all the tight and sore spots  Exercise Name: Friction Massage - Sessions: 1-2 x/day, Notes: one direction on side of bone  icing 3-5x/day  Exercise Name: Nerve Gliding Proximal Radial, Sets: 1 - Reps: 5-10 - Sessions: 4        5/8/2023  More frequent icing  Compression sleeves on elbow to help with icing and at HS     5/23/2023  k-tape    6/21/2023   object while massaging LEP with elbow extended     Next Visit:  US  MFR  Nerve gliding

## 2023-06-23 NOTE — TELEPHONE ENCOUNTER
Prior Authorization Approval    Medication:  Wegovy  Authorization Effective Date: 5/24/2023  Authorization Expiration Date: 6/22/2024  Approved Dose/Quantity: 3ml per 28 days  Reference #: [id number]   Insurance Company: [employer] - Phone [phone] Fax [phone]  Expected CoPay:       CoPay Card Available:      Financial Assistance Needed:   Which Pharmacy is filling the prescription: [facility] - [place], [place] - [address]  Pharmacy Notified: No  Patient Notified: No

## 2023-06-29 DIAGNOSIS — Z20.6 EXPOSURE TO HUMAN IMMUNODEFICIENCY VIRUS: ICD-10-CM

## 2023-06-29 NOTE — TELEPHONE ENCOUNTER
"Last visit 4/25/23    Request for medication refill:  emtricitabine-tenofovir (TRUVADA) 200-300 MG per tablet  Providers if patient needs an appointment and you are willing to give a one month supply please refill for one month and  send a letter/MyChart using \".SMILLIMITEDREFILL\" .smillimited and route chart to \"P [facility] \" (Giving one month refill in non controlled medications is strongly recommended before denial)    If refill has been denied, meaning absolutely no refills without visit, please complete the smart phrase \".smirxrefuse\" and route it to the \"P [facility] MED REFILLS\"  pool to inform the patient and the pharmacy.    Tramaine Ferro MA        "

## 2023-06-30 RX ORDER — EMTRICITABINE AND TENOFOVIR DISOPROXIL FUMARATE 200; 300 MG/1; MG/1
1 TABLET, FILM COATED ORAL DAILY
Qty: 90 TABLET | Refills: 0 | Status: SHIPPED | OUTPATIENT
Start: 2023-06-30 | End: 2023-10-31

## 2023-08-12 DIAGNOSIS — E66.812 CLASS 2 OBESITY IN ADULT, UNSPECIFIED BMI, UNSPECIFIED OBESITY TYPE, UNSPECIFIED WHETHER SERIOUS COMORBIDITY PRESENT: ICD-10-CM

## 2023-08-14 ENCOUNTER — MYC MEDICAL ADVICE (OUTPATIENT)
Dept: FAMILY MEDICINE | Facility: CLINIC | Age: 43
End: 2023-08-14
Payer: COMMERCIAL

## 2023-08-14 DIAGNOSIS — E66.01 MORBID OBESITY (H): ICD-10-CM

## 2023-08-15 RX ORDER — SEMAGLUTIDE 2.4 MG/.75ML
INJECTION, SOLUTION SUBCUTANEOUS
Qty: 3 ML | Refills: 2 | Status: SHIPPED | OUTPATIENT
Start: 2023-08-15 | End: 2023-08-18

## 2023-08-18 DIAGNOSIS — K20.90 ESOPHAGITIS: ICD-10-CM

## 2023-08-18 DIAGNOSIS — K44.9 HIATAL HERNIA: ICD-10-CM

## 2023-08-18 PROBLEM — M25.521 RIGHT ELBOW PAIN: Status: RESOLVED | Noted: 2023-03-29 | Resolved: 2023-08-18

## 2023-08-18 NOTE — TELEPHONE ENCOUNTER
"Request for medication refill:omeprazole (PRILOSEC) 20 MG DR capsule     LV- 03/24/2023    Providers if patient needs an appointment and you are willing to give a one month supply please refill for one month and  send a letter/MyChart using \".SMILLIMITEDREFILL\" .smillimited and route chart to \"P [facility] \" (Giving one month refill in non controlled medications is strongly recommended before denial)    If refill has been denied, meaning absolutely no refills without visit, please complete the smart phrase \".smirxrefuse\" and route it to the \"P [facility] MED REFILLS\"  pool to inform the patient and the pharmacy.    Chau Davies CMA      "

## 2023-08-18 NOTE — TELEPHONE ENCOUNTER
"Last seen 4/25/23 virtual visit, pt sent MyChart message requesting 1.7 dose sent to [place]    Request for medication refill:    Semaglutide-Weight Management (WEGOVY) 1.7 MG/0.75ML pen     Providers if patient needs an appointment and you are willing to give a one month supply please refill for one month and  send a letter/MyChart using \".SMILLIMITEDREFILL\" .smillimited and route chart to \"P [facility] \" (Giving one month refill in non controlled medications is strongly recommended before denial)    If refill has been denied, meaning absolutely no refills without visit, please complete the smart phrase \".smirxrefuse\" and route it to the \"P [facility] MED REFILLS\"  pool to inform the patient and the pharmacy.    Alysia Phan RN  "

## 2023-08-24 ENCOUNTER — LAB (OUTPATIENT)
Dept: LAB | Facility: CLINIC | Age: 43
End: 2023-08-24
Payer: COMMERCIAL

## 2023-08-24 DIAGNOSIS — I10 BENIGN ESSENTIAL HYPERTENSION: ICD-10-CM

## 2023-08-24 DIAGNOSIS — Z20.6 EXPOSURE TO HUMAN IMMUNODEFICIENCY VIRUS: ICD-10-CM

## 2023-08-24 LAB
ANION GAP SERPL CALCULATED.3IONS-SCNC: 12 MMOL/L (ref 7–15)
BUN SERPL-MCNC: 21.1 MG/DL (ref 6–20)
CALCIUM SERPL-MCNC: 9.3 MG/DL (ref 8.6–10)
CHLORIDE SERPL-SCNC: 100 MMOL/L (ref 98–107)
CREAT SERPL-MCNC: 1.14 MG/DL (ref 0.67–1.17)
DEPRECATED HCO3 PLAS-SCNC: 26 MMOL/L (ref 22–29)
GFR SERPL CREATININE-BSD FRML MDRD: 82 ML/MIN/1.73M2
GLUCOSE SERPL-MCNC: 101 MG/DL (ref 70–99)
POTASSIUM SERPL-SCNC: 4.1 MMOL/L (ref 3.4–5.3)
SODIUM SERPL-SCNC: 138 MMOL/L (ref 136–145)

## 2023-08-24 PROCEDURE — 87389 HIV-1 AG W/HIV-1&-2 AB AG IA: CPT

## 2023-08-24 PROCEDURE — 36415 COLL VENOUS BLD VENIPUNCTURE: CPT

## 2023-08-24 PROCEDURE — 80048 BASIC METABOLIC PNL TOTAL CA: CPT

## 2023-08-25 LAB — HIV 1+2 AB+HIV1 P24 AG SERPL QL IA: NONREACTIVE

## 2023-09-11 DIAGNOSIS — F90.9 ATTENTION DEFICIT HYPERACTIVITY DISORDER (ADHD), UNSPECIFIED ADHD TYPE: ICD-10-CM

## 2023-09-11 RX ORDER — BUPROPION HYDROCHLORIDE 150 MG/1
TABLET ORAL
Qty: 30 TABLET | Refills: 3 | Status: SHIPPED | OUTPATIENT
Start: 2023-09-11 | End: 2023-12-26

## 2023-09-11 NOTE — TELEPHONE ENCOUNTER
"Request for medication refill:  buPROPion (WELLBUTRIN XL) 150 MG 24 hr tablet   Providers if patient needs an appointment and you are willing to give a one month supply please refill for one month and  send a letter/MyChart using \".SMILLIMITEDREFILL\" .smillimited and route chart to \"P [facility] \" (Giving one month refill in non controlled medications is strongly recommended before denial)    If refill has been denied, meaning absolutely no refills without visit, please complete the smart phrase \".smirxrefuse\" and route it to the \"P [facility] MED REFILLS\"  pool to inform the patient and the pharmacy.    Denia Miller RN      "

## 2023-09-25 DIAGNOSIS — E29.1 HYPOGONADISM MALE: ICD-10-CM

## 2023-09-26 RX ORDER — TESTOSTERONE CYPIONATE 200 MG/ML
50 INJECTION, SOLUTION INTRAMUSCULAR WEEKLY
Qty: 4 ML | Refills: 5 | Status: SHIPPED | OUTPATIENT
Start: 2023-09-26 | End: 2024-02-16

## 2023-10-07 ENCOUNTER — HEALTH MAINTENANCE LETTER (OUTPATIENT)
Age: 43
End: 2023-10-07

## 2023-10-29 DIAGNOSIS — Z20.6 EXPOSURE TO HUMAN IMMUNODEFICIENCY VIRUS: ICD-10-CM

## 2023-10-30 NOTE — TELEPHONE ENCOUNTER
"Request for medication refill:  emtricitabine-tenofovir (TRUVADA) 200-300 MG per tablet     Providers if patient needs an appointment and you are willing to give a one month supply please refill for one month and  send a letter/MyChart using \".SMILLIMITEDREFILL\" .smillimited and route chart to \"P [facility] \" (Giving one month refill in non controlled medications is strongly recommended before denial)    If refill has been denied, meaning absolutely no refills without visit, please complete the smart phrase \".smirxrefuse\" and route it to the \"P [facility] MED REFILLS\"  pool to inform the patient and the pharmacy.    Tramaine Ferro CMA      "

## 2023-10-31 RX ORDER — EMTRICITABINE AND TENOFOVIR DISOPROXIL FUMARATE 200; 300 MG/1; MG/1
1 TABLET, FILM COATED ORAL DAILY
Qty: 90 TABLET | Refills: 0 | Status: SHIPPED | OUTPATIENT
Start: 2023-10-31 | End: 2024-02-05

## 2023-12-13 DIAGNOSIS — E78.2 MIXED HYPERLIPIDEMIA: ICD-10-CM

## 2023-12-13 RX ORDER — ATORVASTATIN CALCIUM 20 MG/1
TABLET, FILM COATED ORAL
Qty: 90 TABLET | Refills: 3 | Status: SHIPPED | OUTPATIENT
Start: 2023-12-13

## 2023-12-25 DIAGNOSIS — F90.9 ATTENTION DEFICIT HYPERACTIVITY DISORDER (ADHD), UNSPECIFIED ADHD TYPE: ICD-10-CM

## 2023-12-26 RX ORDER — BUPROPION HYDROCHLORIDE 150 MG/1
TABLET ORAL
Qty: 30 TABLET | Refills: 3 | Status: SHIPPED | OUTPATIENT
Start: 2023-12-26 | End: 2023-12-28

## 2023-12-26 NOTE — TELEPHONE ENCOUNTER
"Request for medication refill:  buPROPion (WELLBUTRIN XL) 150 MG 24 hr tablet     Providers if patient needs an appointment and you are willing to give a one month supply please refill for one month and  send a letter/MyChart using \".SMILLIMITEDREFILL\" .smillimited and route chart to \"P [facility] \" (Giving one month refill in non controlled medications is strongly recommended before denial)    If refill has been denied, meaning absolutely no refills without visit, please complete the smart phrase \".smirxrefuse\" and route it to the \"P [facility] MED REFILLS\"  pool to inform the patient and the pharmacy.    Tramaine Ferro, CMA      "

## 2024-01-23 ASSESSMENT — ENCOUNTER SYMPTOMS
MYALGIAS: 0
NAUSEA: 0
NERVOUS/ANXIOUS: 1
HEMATOCHEZIA: 0
WEAKNESS: 0
EYE PAIN: 0
DYSURIA: 0
PARESTHESIAS: 0
COUGH: 0
SORE THROAT: 0
FEVER: 0
JOINT SWELLING: 0
HEMATURIA: 0
PALPITATIONS: 0
CHILLS: 0
ABDOMINAL PAIN: 0
DIZZINESS: 0
ARTHRALGIAS: 0
FREQUENCY: 0
DIARRHEA: 0
CONSTIPATION: 0
HEADACHES: 0
HEARTBURN: 1
SHORTNESS OF BREATH: 0

## 2024-02-05 DIAGNOSIS — Z20.6 EXPOSURE TO HUMAN IMMUNODEFICIENCY VIRUS: ICD-10-CM

## 2024-02-05 RX ORDER — EMTRICITABINE AND TENOFOVIR DISOPROXIL FUMARATE 200; 300 MG/1; MG/1
1 TABLET, FILM COATED ORAL DAILY
Qty: 90 TABLET | Refills: 0 | Status: SHIPPED | OUTPATIENT
Start: 2024-02-05 | End: 2024-04-18

## 2024-02-06 ENCOUNTER — OFFICE VISIT (OUTPATIENT)
Dept: FAMILY MEDICINE | Facility: CLINIC | Age: 44
End: 2024-02-06
Payer: COMMERCIAL

## 2024-02-06 VITALS
WEIGHT: 254 LBS | TEMPERATURE: 97.6 F | DIASTOLIC BLOOD PRESSURE: 76 MMHG | OXYGEN SATURATION: 97 % | RESPIRATION RATE: 14 BRPM | HEART RATE: 80 BPM | BODY MASS INDEX: 33.66 KG/M2 | SYSTOLIC BLOOD PRESSURE: 122 MMHG | HEIGHT: 73 IN

## 2024-02-06 DIAGNOSIS — Z23 NEED FOR IMMUNIZATION AGAINST TYPHOID: ICD-10-CM

## 2024-02-06 DIAGNOSIS — Z71.84 ENCOUNTER FOR COUNSELING FOR TRAVEL: Primary | ICD-10-CM

## 2024-02-06 PROCEDURE — 99401 PREV MED CNSL INDIV APPRX 15: CPT | Mod: 25 | Performed by: PHYSICIAN ASSISTANT

## 2024-02-06 PROCEDURE — 90691 TYPHOID VACCINE IM: CPT | Mod: GA | Performed by: PHYSICIAN ASSISTANT

## 2024-02-06 PROCEDURE — 90471 IMMUNIZATION ADMIN: CPT | Mod: GA | Performed by: PHYSICIAN ASSISTANT

## 2024-02-06 RX ORDER — AZITHROMYCIN 500 MG/1
TABLET, FILM COATED ORAL
Qty: 3 TABLET | Refills: 0 | Status: SHIPPED | OUTPATIENT
Start: 2024-02-06 | End: 2024-02-16

## 2024-02-06 NOTE — PROGRESS NOTES
SUBJECTIVE: Andrew Up , a 43 year old  male, presents for counseling and information regarding upcoming travel to [facility]. Special medical concerns include: none. He anticipates the following unusual exposures: none.    Itinerary:  [facility]    Departure Date: 3/9/2024 Return date: 3/17/2024    Reason for travel (i.e. Business, pleasure): pleasure    Visiting an urban or rural area?: urban    Accommodations (i.e. hotel, hostel, friends, family, etc): hotel    Women - First day of your last period: NA    IMMUNIZATION HISTORY  Have you received any vaccinations in the past 4 weeks?  No  Have you ever fainted from having your blood drawn or from an injection?  No  Have you ever had a fever reaction to vaccination?  No  Have you ever had any bad reaction or side effect from any vaccination?  No  Have you ever had hepatitis A or B vaccine?  Yes  Do you live (or work closely) with anyone who has AIDS, an AIDS-like condition, any other immune disorder or who is on chemotherapy for cancer?  No  Have you received any injection of immune globulin or any blood products during the past 12 months?  No    GENERAL MEDICAL HISTORY  Do you have a medical condition that warrants maintenance medication or physician follow-up?  Yes  Do you have a medical condition that is stable now, but that may recur while traveling?  No  Has your spleen been removed?  No  Have you had an acute illness or a fever in the past 48 hours?  No  Are you pregnant, or might you become pregnant on this trip?  Any chance of pregnancy?  No  Are you breastfeeding?  No  Do you have HIV, AIDS, an AIDS-like condition, any other immune disorder, leukemia or cancer?  No  Do you have a severe combined immunodeficiency disease?  No  Have you had your thymus gland removed or history of problems with your thymus, such as myasthenia gravis, DiGeorge syndrome, or thymoma?  No    Do you have severe thrombocytopenia (low platelet count) or a coagulation disorder?  No  Have  you ever had a convulsion, seizure, epilepsy, neurologic condition or brain infection?  No  Do you have any stomach conditions?  No  Do you have a G6PD deficiency?  No  Do you have severe renal or kidney impairment?  No  Do you have a history of psychiatric problems?  No  Do you have a problem with strange dreams and/or nightmares?  No  Do you have insomnia?  Yes  Do you have problems with vaginitis?  No  Do you have psoriasis?  No  Are you prone to motion sickness?  Yes  Have you ever had headaches, nausea, vomiting, or breathing problems from altitude exposure?  Yes      Past Medical History:   Diagnosis Date    Gastroesophageal reflux disease     Hyperlipidemia       Immunization History   Administered Date(s) Administered    COVID-19 MONOVALENT 12+ (Pfizer) 10/18/2022    COVID-19 Monovalent 18+ (Moderna) 03/03/2021, 03/31/2021, 11/02/2021    Flu, Unspecified 10/18/2005, 10/04/2018, 11/01/2020, 08/26/2021, 09/09/2022, 11/05/2023    G7m3-78 Novel Flu 10/14/2009    HEPA 04/20/1999, 01/04/2013    HEPATITIS A (PEDS 12M-18Y) 04/20/1999, 01/04/2013    HPV9 07/08/2021, 08/26/2021, 02/15/2022    HepB 04/20/1999, 05/18/1999, 11/02/2000, 04/10/2001, 05/11/2001, 02/08/2002    HepB, Unspecified 04/20/1999, 05/18/1999, 11/02/2000, 04/10/2001, 05/11/2001    Hepatitis A (ADULT 19+) 01/04/2013    Hepatitis B, Adult 02/08/2002, 09/14/2017    Hepatitis B, Peds 04/20/1999, 05/18/1999, 11/02/2000, 04/10/2001, 05/11/2001, 02/08/2002    Historical mumps 02/27/2003, 05/15/2006    Influenza (IIV3) PF 10/05/2010, 10/01/2011    Influenza Vaccine >6 months,quad, PF 09/14/2017    Influenza Vaccine, 6+MO IM (QUADRIVALENT W/PRESERVATIVES) 10/03/2016, 01/12/2022    Influenza, seasonal, injectable, PF 10/01/2011    MMR 02/08/2002, 02/27/2003, 05/22/2006, 09/07/2017    Mantoux Tuberculin Skin Test 02/08/2002    Pneumococcal 20 valent Conjugate (Prevnar 20) 03/24/2023    Pneumococcal 23 valent 03/26/2012    TD,PF 7+ (Tenivac) 01/01/2005    TDAP  "(Adacel,Boostrix) 03/26/2012, 06/01/2012, 05/26/2022    TDAP Vaccine (Adacel) 03/26/2012    TDAP Vaccine (Boostrix) 06/01/2012    Td (Adult), Adsorbed 07/26/1996, 07/12/2004    Typhoid IM 10/03/2016       Current Outpatient Medications   Medication Sig Dispense Refill    albuterol (PROAIR HFA/PROVENTIL HFA/VENTOLIN HFA) 108 (90 Base) MCG/ACT inhaler Inhale 2 puffs by mouth into the lungs every 6 hours. 8.5 g 2    atorvastatin (LIPITOR) 20 MG tablet Take 1 tablet by mouth once daily. 90 tablet 3    BD DISP NEEDLES 22G X 1-1/2\" MISC USE ONCE WEEKLY FOR ADMINISTERING HORMONE INTRAMUSCULARLY 25 each 3    buPROPion (WELLBUTRIN XL) 150 MG 24 hr tablet Take 2 tablets (300 mg) by mouth daily 60 tablet 3    clindamycin (CLINDAMAX) 1 % external gel Apply topically 2 times daily 60 g 0    emtricitabine-tenofovir (TRUVADA) 200-300 MG per tablet Take 1 tablet by mouth daily 90 tablet 0    EPINEPHrine (ANY BX GENERIC EQUIV) 0.3 MG/0.3ML injection 2-pack Inject 0.3 mLs (0.3 mg) into the muscle once as needed for anaphylaxis 0.6 mL 1    fluticasone (FLONASE) 50 MCG/ACT nasal spray Spray 1 spray into both nostrils daily      Krill Oil Omega-3 500 MG CAPS       lisinopril (ZESTRIL) 20 MG tablet Take 1 tablet (20 mg) by mouth daily 90 tablet 3    melatonin 5 MG tablet Take 1 tablet (5 mg) by mouth nightly as needed for sleep      multivitamin, therapeutic (THERA-VIT) TABS tablet Take 1 tablet by mouth daily      needle, disp, (BD HYPODERMIC NEEDLE) 18G X 1\" MISC USE TO DRAW UP HORMONES ONCE WEEKLY 100 each 2    Needle, Disp, 25G X 1-1/2\" MISC Use once weekly for administering hormone into the muscle. 100 each 2    omeprazole (PRILOSEC) 20 MG DR capsule Take 1 capsule by mouth daily. 90 capsule 2    Semaglutide-Weight Management (WEGOVY) 1.7 MG/0.75ML pen Inject 1.7 mg Subcutaneous once a week 3 mL 4    syringe, disposable, (B-D SYRINGE LUER-RADHA) 1 ML MISC USE ONCE WEEKLY TO DRAW UP HORMONES 100 each 2    testosterone cypionate " (DEPOTESTOSTERONE) 200 MG/ML injection INJECT 0.25 MLS (50 MG) INTO THE MUSCLE ONCE A WEEK 4 mL 5     Allergies   Allergen Reactions    Bees Anaphylaxis    Penicillins Anaphylaxis    Clindamycin     Fenofibrate Muscle Pain (Myalgia)        EXAM: deferred    Immunizations discussed include: Typhoid  Malaria prophylaxis recommended: not needed  Symptomatic treatment for traveler's diarrhea: bismuth subsalicylate, loperamide/diphenoxylate, and azithromycin    ASSESSMENT/PLAN:    (Z71.84) Encounter for counseling for travel  (primary encounter diagnosis)    Comment: Typhoid vaccines today. Patient will return or follow-up with PCP as needed. Prophylaxis given for Traveler's diarrhea and is not needed for Malaria. All questions were answered.     Plan: azithromycin (ZITHROMAX) 500 MG tablet            (Z23) Need for immunization against typhoid  Comment:   Plan: TYPHOID VACCINE, IM              I have reviewed general recommendations for safe travel   including: food/water precautions, insect avoidance, safe sex   practices given high prevalence of HIV and other STDs,   roadway safety. Educational materials and links to the CDC   Traveler's health website have been provided.    Total time 15 minutes, greater than 50 percent in counseling   and coordination of care.

## 2024-02-06 NOTE — PATIENT INSTRUCTIONS
"See travel packet provided  Recommend ultrathon (mosquito repellant), pepto bismol and imodium  The food and drink choices you make while traveling can impact your likelihood of getting sick.   If you aren't sure if a food or drink is safe, the saying \" BOIL IT, COOK IT, PEEL IT, OR FORGET IT\" can help you decide whether it's okay to consume.   Also bring hand  and sun screen with you.  Safe Travels       Today February 6, 2024 you received the    Typhoid - injectable. This vaccine is valid for two years. .    These appointments can be made as a NURSE ONLY visit.    **It is very important for the vaccinations to be given on the scheduled day(s), this helps ensure you receive the full effectiveness of the vaccine.**    Please call [facility] with any questions [phone]    Thank you for visiting [facility]    "

## 2024-02-06 NOTE — NURSING NOTE
Prior to immunization administration, verified patients identity using patient s name and date of birth. Please see Immunization Activity for additional information.     Screening Questionnaire for Adult Immunization    Are you sick today?   No   Do you have allergies to medications, food, a vaccine component or latex?   No   Have you ever had a serious reaction after receiving a vaccination?   No   Do you have a long-term health problem with heart, lung, kidney, or metabolic disease (e.g., diabetes), asthma, a blood disorder, no spleen, complement component deficiency, a cochlear implant, or a spinal fluid leak?  Are you on long-term aspirin therapy?   No   Do you have cancer, leukemia, HIV/AIDS, or any other immune system problem?   No   Do you have a parent, brother, or sister with an immune system problem?   No   In the past 3 months, have you taken medications that affect  your immune system, such as prednisone, other steroids, or anticancer drugs; drugs for the treatment of rheumatoid arthritis, Crohn s disease, or psoriasis; or have you had radiation treatments?   No   Have you had a seizure, or a brain or other nervous system problem?   No   During the past year, have you received a transfusion of blood or blood    products, or been given immune (gamma) globulin or antiviral drug?   No   For women: Are you pregnant or is there a chance you could become       pregnant during the next month?   No   Have you received any vaccinations in the past 4 weeks?   No     Immunization questionnaire answers were all negative.      Patient instructed to remain in clinic for 15 minutes afterwards, and to report any adverse reactions.     Screening performed by Janee Booker CMA on 2/6/2024 at 7:20 AM.

## 2024-02-13 SDOH — HEALTH STABILITY: PHYSICAL HEALTH: ON AVERAGE, HOW MANY MINUTES DO YOU ENGAGE IN EXERCISE AT THIS LEVEL?: 60 MIN

## 2024-02-13 SDOH — HEALTH STABILITY: PHYSICAL HEALTH: ON AVERAGE, HOW MANY DAYS PER WEEK DO YOU ENGAGE IN MODERATE TO STRENUOUS EXERCISE (LIKE A BRISK WALK)?: 3 DAYS

## 2024-02-13 ASSESSMENT — SOCIAL DETERMINANTS OF HEALTH (SDOH): HOW OFTEN DO YOU GET TOGETHER WITH FRIENDS OR RELATIVES?: TWICE A WEEK

## 2024-02-15 DIAGNOSIS — Z71.84 ENCOUNTER FOR COUNSELING FOR TRAVEL: ICD-10-CM

## 2024-02-16 ENCOUNTER — OFFICE VISIT (OUTPATIENT)
Dept: FAMILY MEDICINE | Facility: CLINIC | Age: 44
End: 2024-02-16
Payer: COMMERCIAL

## 2024-02-16 ENCOUNTER — ANCILLARY PROCEDURE (OUTPATIENT)
Dept: GENERAL RADIOLOGY | Facility: CLINIC | Age: 44
End: 2024-02-16
Attending: STUDENT IN AN ORGANIZED HEALTH CARE EDUCATION/TRAINING PROGRAM
Payer: COMMERCIAL

## 2024-02-16 ENCOUNTER — TELEPHONE (OUTPATIENT)
Dept: FAMILY MEDICINE | Facility: CLINIC | Age: 44
End: 2024-02-16

## 2024-02-16 VITALS
WEIGHT: 257.3 LBS | SYSTOLIC BLOOD PRESSURE: 114 MMHG | OXYGEN SATURATION: 96 % | BODY MASS INDEX: 34.1 KG/M2 | DIASTOLIC BLOOD PRESSURE: 79 MMHG | HEART RATE: 77 BPM | HEIGHT: 73 IN | TEMPERATURE: 98.3 F

## 2024-02-16 DIAGNOSIS — D22.5 MELANOCYTIC NEVUS OF TRUNK: ICD-10-CM

## 2024-02-16 DIAGNOSIS — R79.89 LOW TESTOSTERONE: Primary | ICD-10-CM

## 2024-02-16 DIAGNOSIS — E78.2 MIXED HYPERLIPIDEMIA: ICD-10-CM

## 2024-02-16 DIAGNOSIS — I10 BENIGN ESSENTIAL HYPERTENSION: ICD-10-CM

## 2024-02-16 DIAGNOSIS — Z13.1 SCREENING FOR DIABETES MELLITUS: ICD-10-CM

## 2024-02-16 DIAGNOSIS — Z00.00 ROUTINE GENERAL MEDICAL EXAMINATION AT A HEALTH CARE FACILITY: ICD-10-CM

## 2024-02-16 DIAGNOSIS — Z20.6 EXPOSURE TO HUMAN IMMUNODEFICIENCY VIRUS: ICD-10-CM

## 2024-02-16 DIAGNOSIS — D22.9 ATYPICAL NEVUS: ICD-10-CM

## 2024-02-16 DIAGNOSIS — E34.9 TESTOSTERONE DEFICIENCY: ICD-10-CM

## 2024-02-16 DIAGNOSIS — M79.674 PAIN OF TOE OF RIGHT FOOT: ICD-10-CM

## 2024-02-16 DIAGNOSIS — E29.1 HYPOGONADISM MALE: ICD-10-CM

## 2024-02-16 PROBLEM — E66.01 MORBID OBESITY (H): Status: RESOLVED | Noted: 2023-04-25 | Resolved: 2024-02-16

## 2024-02-16 PROCEDURE — 99396 PREV VISIT EST AGE 40-64: CPT | Performed by: STUDENT IN AN ORGANIZED HEALTH CARE EDUCATION/TRAINING PROGRAM

## 2024-02-16 PROCEDURE — 99213 OFFICE O/P EST LOW 20 MIN: CPT | Mod: 25 | Performed by: STUDENT IN AN ORGANIZED HEALTH CARE EDUCATION/TRAINING PROGRAM

## 2024-02-16 PROCEDURE — 73630 X-RAY EXAM OF FOOT: CPT | Mod: RT | Performed by: RADIOLOGY

## 2024-02-16 RX ORDER — AZITHROMYCIN 500 MG/1
TABLET, FILM COATED ORAL
Qty: 3 TABLET | Refills: 0 | Status: SHIPPED | OUTPATIENT
Start: 2024-02-16 | End: 2024-02-16

## 2024-02-16 RX ORDER — LISINOPRIL 20 MG/1
20 TABLET ORAL DAILY
Qty: 90 TABLET | Refills: 3 | Status: SHIPPED | OUTPATIENT
Start: 2024-02-16

## 2024-02-16 RX ORDER — TESTOSTERONE CYPIONATE 200 MG/ML
50 INJECTION, SOLUTION INTRAMUSCULAR WEEKLY
Qty: 4 ML | Refills: 5 | Status: CANCELLED | OUTPATIENT
Start: 2024-02-16

## 2024-02-16 NOTE — TELEPHONE ENCOUNTER
Central Prior Authorization Team  Phone: [phone]    Prior Authorization Approval    Medication: TESTOSTERONE CYPIONATE 200 MG/ML IJ SOLN  Authorization Effective Date: 1/17/2024  Authorization Expiration Date: 2/15/2025  Approved Dose/Quantity:   Reference #:     Insurance Company: [employer] - Phone [phone] Fax [phone]  Expected CoPay: $    CoPay Card Available:      Financial Assistance Needed:   Which Pharmacy is filling the prescription: [facility] [place], [place] - [address]  Pharmacy Notified: YES  Patient Notified: PHARMACY WILL NOTIFY PT WHEN READY

## 2024-02-16 NOTE — PROGRESS NOTES
"Preventive Care Visit  [facility] KAYLA Payton DO, Family Medicine  Feb 16, 2024    Assessment & Plan     Routine general medical exam     Mixed hyperlipidemia  Routine screening. Plan to check fasting level 2/19 AM.  - Lipid panel    Screening for diabetes mellitus  Routine screening   - Glucose    Atypical nevus of back   Unknown if growing since pt unable to see. Documented with picture in chart via dermatoscope. Discussed options - will surveil closely and remove if changing in 6mos.     Pain of toe of right foot  Has had suspected bone spur for the past 3 years but it has gotten worse recently. On physical exam, scabbed over lesion with surrounding erythema on bony prominence of R medial big toe.  - XR of foot  - Podiatry referral    Testosterone deficiency  Low testosterone  On weekly injectable testosterone. Plan to check testosterone level at midcycle Monday morning. Check hemoglobin since on long term testosterone therapy.  - Testosterone total  - Hemoglobin    PReP therapy  Q3mo HIV lab due. Renal function labs UTD.  - HIV Antigen Antibody Combo    Benign essential hypertension  Well managed on current medication regimen. 114/79 today.  - lisinopril (ZESTRIL) 20 MG tablet  Dispense: 90 tablet; Refill: 3      Ordering of each unique test  Prescription drug management       BMI  Estimated body mass index is 33.95 kg/m  as calculated from the following:    Height as of this encounter: 1.854 m (6' 1\").    Weight as of this encounter: 116.7 kg (257 lb 4.8 oz).     Counseling  Appropriate preventive services were discussed with this patient, including applicable screening as appropriate for fall prevention, nutrition, physical activity, Tobacco-use cessation, weight loss and cognition.  Checklist reviewing preventive services available has been given to the patient.  Reviewed patient's diet, addressing concerns and/or questions.   He is at risk for lack of exercise and has been " provided with information to increase physical activity for the benefit of his well-being.   He is at risk for psychosocial distress and has been provided with information to reduce risk.         Return in about 53 weeks (around 2/21/2025) for Annual Wellness Visit.    Pineda Morales is a 43 year old, presenting for the following:  Annual Visit (Annual wellness exam )        2/16/2024     9:41 AM   Additional Questions   [facility]        Health Care Directive  Patient does not have a Health Care Directive or Living Will: Patient states has Advance Directive and will bring in a copy to clinic.      HPI  Has suspected toe spur on his L big toe that has been there for 3 years but is now interfering with ability to play hockey and would like it removed. It usually calms down during the summer. When it is not calloused over, it feels like the bone is right there ready to pop out. He also has a mole on his ear he would like checked.It is bluish and he thinks it is filled with blood. He also mentioned a couple of other moles that he thought Dr. TREVIÑO wanted to check out based on his last appointment.         2/13/2024   General Health   How would you rate your overall physical health? Good   Feel stress (tense, anxious, or unable to sleep) Only a little   (!) STRESS CONCERN      2/13/2024   Nutrition   Three or more servings of calcium each day? Yes   Diet: Regular (no restrictions)   How many servings of fruit and vegetables per day? 4 or more   How many sweetened beverages each day? 0-1         2/13/2024   Exercise   Days per week of moderate/strenous exercise 3 days   Average minutes spent exercising at this level 60 min         2/13/2024   Social Factors   Frequency of gathering with friends or relatives Twice a week   Worry food won't last until get money to buy more No    No   Food not last or not have enough money for food? No    No   Do you have housing?  Yes    Yes   Are you worried about losing your  housing? No    No   Lack of transportation? No    No   Unable to get utilities (heat,electricity)? No    No         2024   Dental   Dentist two times every year? Yes          No data to display                     Today's PHQ-2 Score:       2024    12:52 PM   PHQ-2 (  [employer])   Q1: Little interest or pleasure in doing things 0   Q2: Feeling down, depressed or hopeless 0   PHQ-2 Score 0   Q1: Little interest or pleasure in doing things Not at all   Q2: Feeling down, depressed or hopeless Not at all   PHQ-2 Score 0         2024   Substance Use   Alcohol more than 3/day or more than 7/wk No   Do you use any other substances recreationally? No     Social History     Tobacco Use    Smoking status: Former     Packs/day: 0.50     Years: 12.00     Additional pack years: 0.00     Total pack years: 6.00     Types: Cigarettes     Quit date: 2021     Years since quittin.6    Smokeless tobacco: Never    Tobacco comments:     using Wellbutrin   Substance Use Topics    Alcohol use: Yes     Alcohol/week: 0.0 standard drinks of alcohol     Comment: 3-4 times per month, 1-2 drinks    Drug use: No          2024   STI Screening   New sexual partner(s) since last STI/HIV test? No   The 10-year ASCVD risk score (Lyndsey GILBERT, et al., 2019) is: 1.7%    Values used to calculate the score:      Age: 43 years      Sex: Male      Is Non- : No      Diabetic: No      Tobacco smoker: No      Systolic Blood Pressure: 114 mmHg      Is BP treated: Yes      HDL Cholesterol: 29 mg/dL      Total Cholesterol: 142 mg/dL        2024   Contraception/Family Planning   Questions about contraception or family planning No        Reviewed and updated as needed this visit by Provider   Tobacco  Allergies  Meds  Problems  Med Hx  Surg Hx  Fam Hx                  Review of Systems  Constitutional, HEENT, cardiovascular, pulmonary, gi and gu systems are negative, except as otherwise noted.    "  Objective    Exam  /79   Pulse 77   Temp 98.3  F (36.8  C) (Oral)   Ht 1.854 m (6' 1\")   Wt 116.7 kg (257 lb 4.8 oz)   SpO2 96%   BMI 33.95 kg/m     Estimated body mass index is 33.95 kg/m  as calculated from the following:    Height as of this encounter: 1.854 m (6' 1\").    Weight as of this encounter: 116.7 kg (257 lb 4.8 oz).    Physical Exam  GENERAL: alert and no distress  NECK: no adenopathy, no asymmetry, masses, or scars  RESP: lungs clear to auscultation - no rales, rhonchi or wheezes  CV: regular rate and rhythm, normal S1 S2, no S3 or S4, no murmur, click or rub, no peripheral edema  ABDOMEN: soft, nontender, no hepatosplenomegaly, no masses and bowel sounds normal  MS: no gross musculoskeletal defects noted, no edema  SKIN: 2mm blue mole -  L external ear . Several scattered nevi to upper back. One 4-5mm ovoid melanocytic lesion with faint central irregularity on L medial thoracic back - see picture in chart   Scabbed over lesion on R big toe with surrounding erythema and mild swelling.       Mariel Tay, MS3    I was present with the medical student who participated in the service and in the documentation of this note. I have verified the history and personally performed the physical exam and medical decision making, and have verified the content of the note, which accurately reflects my assessment of the patient and the plan of care.   Mitra Payton DO   Signed Electronically by: Mitra Payton DO    "

## 2024-02-16 NOTE — TELEPHONE ENCOUNTER
Hello, this patient's insurance will not pay for the following medication without a Prior Authorization.   ?  The Patient's insurance company is: [employer]  Insurance Phone Number: [phone]  The patient's ID number is: [id number]  Drug Name: Testosterone CYP 200mg/ml inj  NDC: [address]-76  Qty: 4 ml  If needed, our pharmacy's NPI is: [id number]     Please advise if you will pursue a prior authorization for this medication or change the order. Contact [facility] with any questions.  ?  Thanks,  ?  Juan David Doll CPhT  ?  [facility]?  PH: [phone]  Fax: [phone]

## 2024-02-16 NOTE — TELEPHONE ENCOUNTER
Central Prior Authorization Team  Phone: [phone]    PA Initiation    Medication: TESTOSTERONE CYPIONATE 200 MG/ML IJ [facility]  Insurance Company: [employer] - Phone [phone] Fax [phone]  Pharmacy Filling the Rx: [place], [place] - [address]   Filling Pharmacy Phone: [phone]  Filling Pharmacy Fax:    Start Date: 2/16/2024

## 2024-02-20 ENCOUNTER — LAB (OUTPATIENT)
Dept: LAB | Facility: CLINIC | Age: 44
End: 2024-02-20
Payer: COMMERCIAL

## 2024-02-20 DIAGNOSIS — E34.9 TESTOSTERONE DEFICIENCY: ICD-10-CM

## 2024-02-20 DIAGNOSIS — R79.89 LOW TESTOSTERONE: ICD-10-CM

## 2024-02-20 DIAGNOSIS — Z20.6 EXPOSURE TO HUMAN IMMUNODEFICIENCY VIRUS: ICD-10-CM

## 2024-02-20 DIAGNOSIS — E78.2 MIXED HYPERLIPIDEMIA: ICD-10-CM

## 2024-02-20 DIAGNOSIS — Z13.1 SCREENING FOR DIABETES MELLITUS: ICD-10-CM

## 2024-02-20 LAB
CHOLEST SERPL-MCNC: 151 MG/DL
FASTING STATUS PATIENT QL REPORTED: YES
FASTING STATUS PATIENT QL REPORTED: YES
GLUCOSE SERPL-MCNC: 116 MG/DL (ref 70–99)
HDLC SERPL-MCNC: 34 MG/DL
HGB BLD-MCNC: 16.3 G/DL (ref 13.3–17.7)
HIV 1+2 AB+HIV1 P24 AG SERPL QL IA: NONREACTIVE
LDLC SERPL CALC-MCNC: 74 MG/DL
NONHDLC SERPL-MCNC: 117 MG/DL
TRIGL SERPL-MCNC: 215 MG/DL

## 2024-02-20 PROCEDURE — 87389 HIV-1 AG W/HIV-1&-2 AB AG IA: CPT

## 2024-02-20 PROCEDURE — 85018 HEMOGLOBIN: CPT

## 2024-02-20 PROCEDURE — 82465 ASSAY BLD/SERUM CHOLESTEROL: CPT

## 2024-02-20 PROCEDURE — 36415 COLL VENOUS BLD VENIPUNCTURE: CPT

## 2024-02-20 PROCEDURE — 84403 ASSAY OF TOTAL TESTOSTERONE: CPT

## 2024-02-20 PROCEDURE — 82947 ASSAY GLUCOSE BLOOD QUANT: CPT

## 2024-02-20 NOTE — PATIENT INSTRUCTIONS
Preventive Care Advice   This is general advice given by our system to help you stay healthy. However, your care team may have specific advice just for you. Please talk to your care team about your preventive care needs.  Nutrition  Eat 5 or more servings of fruits and vegetables each day.  Try wheat bread, brown rice and whole grain pasta (instead of white bread, rice, and pasta).  Get enough calcium and vitamin D. Check the label on foods and aim for 100% of the RDA (recommended daily allowance).  Lifestyle  Exercise at least 150 minutes each week  (30 minutes a day, 5 days a week).  Do muscle strengthening activities 2 days a week. These help control your weight and prevent disease.  No smoking.  Wear sunscreen to prevent skin cancer.  Have a dental exam and cleaning every 6 months.  Yearly exams  See your health care team every year to talk about:  Any changes in your health.  Any medicines your care team has prescribed.  Preventive care, family planning, and ways to prevent chronic diseases.  Shots (vaccines)   HPV shots (up to age 26), if you've never had them before.  Hepatitis B shots (up to age 59), if you've never had them before.  COVID-19 shot: Get this shot when it's due.  Flu shot: Get a flu shot every year.  Tetanus shot: Get a tetanus shot every 10 years.  Pneumococcal, hepatitis A, and RSV shots: Ask your care team if you need these based on your risk.  Shingles shot (for age 50 and up)  General health tests  Diabetes screening:  Starting at age 35, Get screened for diabetes at least every 3 years.  If you are younger than age 35, ask your care team if you should be screened for diabetes.  Cholesterol test: At age 39, start having a cholesterol test every 5 years, or more often if advised.  Bone density scan (DEXA): At age 50, ask your care team if you should have this scan for osteoporosis (brittle bones).  Hepatitis C: Get tested at least once in your life.  STIs (sexually transmitted  infections)  Before age 24: Ask your care team if you should be screened for STIs.  After age 24: Get screened for STIs if you're at risk. You are at risk for STIs (including HIV) if:  You are sexually active with more than one person.  You don't use condoms every time.  You or a partner was diagnosed with a sexually transmitted infection.  If you are at risk for HIV, ask about PrEP medicine to prevent HIV.  Get tested for HIV at least once in your life, whether you are at risk for HIV or not.  Cancer screening tests  Cervical cancer screening: If you have a cervix, begin getting regular cervical cancer screening tests starting at age 21.  Breast cancer scan (mammogram): If you've ever had breasts, begin having regular mammograms starting at age 40. This is a scan to check for breast cancer.  Colon cancer screening: It is important to start screening for colon cancer at age 45.  Have a colonoscopy test every 10 years (or more often if you're at risk) Or, ask your provider about stool tests like a FIT test every year or Cologuard test every 3 years.  To learn more about your testing options, visit:   https://www.[employer]/253772.pdf.  For help making a decision, visit:   https://bit.ly/xf33995.  Prostate cancer screening test: If you have a prostate, ask your care team if a prostate cancer screening test (PSA) at age 55 is right for you.  Lung cancer screening: If you are a current or former smoker ages 50 to 80, ask your care team if ongoing lung cancer screenings are right for you.  For informational purposes only. Not to replace the advice of your health care provider. Copyright   2023 [facility]. All rights reserved. Clinically reviewed by the [facility]itions [facility]. [employer] 364027 - REV 01/24.    Learning About Stress  What is stress?     Stress is your body's response to a hard situation. Your body can have a physical, emotional, or mental response. Stress is a fact of life for  most people, and it affects everyone differently. What causes stress for you may not be stressful for someone else.  A lot of things can cause stress. You may feel stress when you go on a job interview, take a test, or run a race. This kind of short-term stress is normal and even useful. It can help you if you need to work hard or react quickly. For example, stress can help you finish an important job on time.  Long-term stress is caused by ongoing stressful situations or events. Examples of long-term stress include long-term health problems, ongoing problems at work, or conflicts in your family. Long-term stress can harm your health.  How does stress affect your health?  When you are stressed, your body responds as though you are in danger. It makes hormones that speed up your heart, make you breathe faster, and give you a burst of energy. This is called the fight-or-flight stress response. If the stress is over quickly, your body goes back to normal and no harm is done.  But if stress happens too often or lasts too long, it can have bad effects. Long-term stress can make you more likely to get sick, and it can make symptoms of some diseases worse. If you tense up when you are stressed, you may develop neck, shoulder, or low back pain. Stress is linked to high blood pressure and heart disease.  Stress also harms your emotional health. It can make you smith, tense, or depressed. Your relationships may suffer, and you may not do well at work or school.  What can you do to manage stress?  You can try these things to help manage stress:   Do something active. Exercise or activity can help reduce stress. Walking is a great way to get started. Even everyday activities such as housecleaning or yard work can help.  Try yoga or woody chi. These techniques combine exercise and meditation. You may need some training at first to learn them.  Do something you enjoy. For example, listen to music or go to a movie. Practice your  "hobby or do volunteer work.  Meditate. This can help you relax, because you are not worrying about what happened before or what may happen in the future.  Do guided imagery. Imagine yourself in any setting that helps you feel calm. You can use online videos, books, or a teacher to guide you.  Do breathing exercises. For example:  From a standing position, bend forward from the waist with your knees slightly bent. Let your arms dangle close to the floor.  Breathe in slowly and deeply as you return to a standing position. Roll up slowly and lift your head last.  Hold your breath for just a few seconds in the standing position.  Breathe out slowly and bend forward from the waist.  Let your feelings out. Talk, laugh, cry, and express anger when you need to. Talking with supportive friends or family, a counselor, or a zack leader about your feelings is a healthy way to relieve stress. Avoid discussing your feelings with people who make you feel worse.  Write. It may help to write about things that are bothering you. This helps you find out how much stress you feel and what is causing it. When you know this, you can find better ways to cope.  What can you do to prevent stress?  You might try some of these things to help prevent stress:  Manage your time. This helps you find time to do the things you want and need to do.  Get enough sleep. Your body recovers from the stresses of the day while you are sleeping.  Get support. Your family, friends, and community can make a difference in how you experience stress.  Limit your news feed. Avoid or limit time on social media or news that may make you feel stressed.  Do something active. Exercise or activity can help reduce stress. Walking is a great way to get started.  Where can you learn more?  Go to https://www.healthwise.net/patiented  Enter N032 in the search box to learn more about \"Learning About Stress.\"  Current as of: February 26, 2023               Content Version: " 13.8    1336-3722 [employer].   Care instructions adapted under license by your healthcare professional. If you have questions about a medical condition or this instruction, always ask your healthcare professional. [employer] disclaims any warranty or liability for your use of this information.

## 2024-02-22 LAB — TESTOST SERPL-MCNC: 410 NG/DL (ref 240–950)

## 2024-02-25 DIAGNOSIS — Z91.030 BEE ALLERGY STATUS: ICD-10-CM

## 2024-02-26 RX ORDER — EPINEPHRINE 0.3 MG/.3ML
INJECTION SUBCUTANEOUS
Qty: 2 ML | Refills: 0 | Status: SHIPPED | OUTPATIENT
Start: 2024-02-26

## 2024-02-26 NOTE — TELEPHONE ENCOUNTER
"Request for medication refill: EPINEPHrine (ANY BX GENERIC EQUIV) 0.3 MG/0.3ML injection 2-pack     Providers if patient needs an appointment and you are willing to give a one month supply please refill for one month and  send a letter/MyChart using \".SMILLIMITEDREFILL\" .smillimited and route chart to \"[facility] \" (Giving one month refill in non controlled medications is strongly recommended before denial)    If refill has been denied, meaning absolutely no refills without visit, please complete the smart phrase \".smirxrefuse\" and route it to the \"P SMI MED REFILLS\"  pool to inform the patient and the pharmacy.    Trace Key, CMA    "

## 2024-02-28 ENCOUNTER — OFFICE VISIT (OUTPATIENT)
Dept: PODIATRY | Facility: CLINIC | Age: 44
End: 2024-02-28
Attending: STUDENT IN AN ORGANIZED HEALTH CARE EDUCATION/TRAINING PROGRAM
Payer: COMMERCIAL

## 2024-02-28 VITALS — DIASTOLIC BLOOD PRESSURE: 78 MMHG | WEIGHT: 257 LBS | BODY MASS INDEX: 33.91 KG/M2 | SYSTOLIC BLOOD PRESSURE: 127 MMHG

## 2024-02-28 DIAGNOSIS — M25.70 OSTEOPHYTE, UNSPECIFIED JOINT: Primary | ICD-10-CM

## 2024-02-28 DIAGNOSIS — M79.674 PAIN OF TOE OF RIGHT FOOT: ICD-10-CM

## 2024-02-28 PROCEDURE — 99203 OFFICE O/P NEW LOW 30 MIN: CPT | Performed by: PODIATRIST

## 2024-02-28 NOTE — PROGRESS NOTES
ASSESSMENT:  Encounter Diagnoses   Name Primary?    Osteophyte/bone spur, right great toe Yes    Pain of toe of right foot      MEDICAL DECISION MAKING:  I personally reviewed the previous x-ray images with Andrew.  Shoulder the small osteophyte and irregularity off of the medial aspect right hallux proximal phalangeal head.  This correlates to his site of pain and skin irritation.    He does his best to accommodate this and footwear.  He is interested in surgical removal of the bone spur.    I think this is a reasonable request.  We did have time to review some of the unlikely surgical risks.  We reviewed the surgical procedure and typical postoperative course.  He was given more detailed information in writing.    Case request was placed.    Disclaimer: This note consists of symbols derived from keyboarding, dictation and/or voice recognition software. As a result, there may be errors in the script that have gone undetected. Please consider this when interpreting information found in this chart.    Marty Isidro DPM, FACFAS, MS    [place] [facility]le [facility]      ____________________________________________________________________    HPI:       I was asked by Mitra Payton DO  to evaluate Andrew Up in consultation for right foot pain.       Andrew presents reporting a bone spur causing pain, right great toe.    No recollection of injury.  He is experienced pain for 3 years, throbbing and shooting.  Pain can be rated as high as an 8 out of 10.  There is skin irritation.  More pain in certain shoes and hockey skates.  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  *  Past Medical History:   Diagnosis Date    Gastroesophageal reflux disease     Hyperlipidemia    *  *  Past Surgical History:   Procedure Laterality Date    HERNIA REPAIR      ORCHIECTOMY INGUINAL Left 2011    SEPTOPLASTY      2010 about    VARICOCELECTOMY Left 2009    ** repair   *  *  Current  "Outpatient Medications   Medication Sig Dispense Refill    albuterol (PROAIR HFA/PROVENTIL HFA/VENTOLIN HFA) 108 (90 Base) MCG/ACT inhaler Inhale 2 puffs by mouth into the lungs every 6 hours. 8.5 g 2    atorvastatin (LIPITOR) 20 MG tablet Take 1 tablet by mouth once daily. 90 tablet 3    azithromycin (ZITHROMAX) 500 MG tablet Take 1 tablet by mouth daily for up to 3 days as needed for traveler's diarrhea. 3 tablet 0    BD DISP NEEDLES 22G X 1-1/2\" MISC USE ONCE WEEKLY FOR ADMINISTERING HORMONE INTRAMUSCULARLY 25 each 3    buPROPion (WELLBUTRIN XL) 150 MG 24 hr tablet Take 2 tablets (300 mg) by mouth daily 60 tablet 3    clindamycin (CLINDAMAX) 1 % external gel Apply topically 2 times daily 60 g 0    diazepam (VALIUM) 5 MG tablet Take 1 tablet (5 mg) by mouth every 6 hours as needed (for flight anxiety) 5 tablet 0    emtricitabine-tenofovir (TRUVADA) 200-300 MG per tablet Take 1 tablet by mouth daily 90 tablet 0    EPINEPHrine (ANY BX GENERIC EQUIV) 0.3 MG/0.3ML injection 2-pack Inject 0.3 mg (0.3 ml) into the muscle once as needed for anaphylaxis. 2 mL 0    fluticasone (FLONASE) 50 MCG/ACT nasal spray Spray 1 spray into both nostrils daily      Krill Oil Omega-3 500 MG CAPS       lisinopril (ZESTRIL) 20 MG tablet Take 1 tablet (20 mg) by mouth daily 90 tablet 3    melatonin 5 MG tablet Take 1 tablet (5 mg) by mouth nightly as needed for sleep      multivitamin, therapeutic (THERA-VIT) TABS tablet Take 1 tablet by mouth daily      needle, disp, (BD HYPODERMIC NEEDLE) 18G X 1\" MISC USE TO DRAW UP HORMONES ONCE WEEKLY 100 each 2    Needle, Disp, 25G X 1-1/2\" MISC Use once weekly for administering hormone into the muscle. 100 each 2    omeprazole (PRILOSEC) 20 MG DR capsule Take 1 capsule by mouth daily. 90 capsule 2    syringe, disposable, (B-D SYRINGE LUER-RADHA) 1 ML MISC USE ONCE WEEKLY TO DRAW UP HORMONES 100 each 2    testosterone cypionate (DEPOTESTOSTERONE) 200 MG/ML injection Inject 0.25 mLs (50 mg) into the " muscle once a week 4 mL 5         EXAM:    Vitals: /78   Wt 116.6 kg (257 lb)   BMI 33.91 kg/m    BMI: Body mass index is 33.91 kg/m .    Constitutional:  Andrew Up is in no apparent distress, appears well-nourished.  Cooperative with history and physical exam.    Vascular:  Pedal pulses are palpable for both the DP and PT arteries.  CFT < 3 sec.  No edema.      Neuro: Light touch sensation is intact to the L4, L5, S1 distributions  No evidence of weakness, spasticity, or contracture in the lower extremities.     Derm: Normal texture and turgor.  No erythema, ecchymosis, or cyanosis.  No open lesions.  Hyperkeratotic lesion medial right hallux in the area of the bone spur.    Musculoskeletal:    Lower extremity muscle strength is normal. No gross deformities.  Widening of the medial aspect, right hallux interphalangeal joint.    X-Ray Findings:  I personally reviewed the right foot images.  See comments above    3 views right foot radiographs 2/16/2024 1:50 PM     History: R medial first toe hard prominence - eval for underlying bony  abnormality; Pain of toe of right foot     Comparison: None available.     Findings:     AP, oblique, and lateral  views of the right foot were obtained.      No acute osseous abnormality.       Ossicle and opposing cystic change at the first proximal phalangeal  head tibial aspect, likely sequelae of remote trauma.     Cystlike change, possible sequelae of erosion at the fifth proximal  phalangeal head. Mild degenerative change first metatarsophalangeal  joint.     Lisfranc articulation alignment is grossly congruent on these  non-weight bearing images.     Tiny Achilles tendon insertional and plantar calcaneal enthesopathy.      Soft tissue is unremarkable.                                                                      Impression:  1. Ossicle and opposing cystic change at the first proximal phalangeal  head tibial aspect, likely sequelae of remote trauma.  2.  Cystlike change, possible sequelae of erosion at the fifth proximal  phalangeal head.     KELSIE MONTAÑO

## 2024-02-28 NOTE — LETTER
2/28/2024         RE: Andrew Up  [address]  [facility] [address]        Dear Colleague,    Thank you for referring your patient, Andrew Up, to the [facility]. Please see a copy of my visit note below.    ASSESSMENT:  Encounter Diagnoses   Name Primary?     Osteophyte/bone spur, right great toe Yes     Pain of toe of right foot      MEDICAL DECISION MAKING:  I personally reviewed the previous x-ray images with Andrew.  Shoulder the small osteophyte and irregularity off of the medial aspect right hallux proximal phalangeal head.  This correlates to his site of pain and skin irritation.    He does his best to accommodate this and footwear.  He is interested in surgical removal of the bone spur.    I think this is a reasonable request.  We did have time to review some of the unlikely surgical risks.  We reviewed the surgical procedure and typical postoperative course.  He was given more detailed information in writing.    Case request was placed.    Disclaimer: This note consists of symbols derived from keyboarding, dictation and/or voice recognition software. As a result, there may be errors in the script that have gone undetected. Please consider this when interpreting information found in this chart.    Marty Isidro DPM, FACFAS, MS    [place] [facility]le [facility]      ____________________________________________________________________    HPI:       I was asked by Mitra Payton DO  to evaluate Andrew Up in consultation for right foot pain.       Andrew presents reporting a bone spur causing pain, right great toe.    No recollection of injury.  He is experienced pain for 3 years, throbbing and shooting.  Pain can be rated as high as an 8 out of 10.  There is skin irritation.  More pain in certain shoes and hockey skates.  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  *  Past Medical History:  "  Diagnosis Date     Gastroesophageal reflux disease      Hyperlipidemia    *  *  Past Surgical History:   Procedure Laterality Date     HERNIA REPAIR       ORCHIECTOMY INGUINAL Left 2011     SEPTOPLASTY      2010 about     VARICOCELECTOMY Left 2009    ** repair   *  *  Current Outpatient Medications   Medication Sig Dispense Refill     albuterol (PROAIR HFA/PROVENTIL HFA/VENTOLIN HFA) 108 (90 Base) MCG/ACT inhaler Inhale 2 puffs by mouth into the lungs every 6 hours. 8.5 g 2     atorvastatin (LIPITOR) 20 MG tablet Take 1 tablet by mouth once daily. 90 tablet 3     azithromycin (ZITHROMAX) 500 MG tablet Take 1 tablet by mouth daily for up to 3 days as needed for traveler's diarrhea. 3 tablet 0     BD DISP NEEDLES 22G X 1-1/2\" MISC USE ONCE WEEKLY FOR ADMINISTERING HORMONE INTRAMUSCULARLY 25 each 3     buPROPion (WELLBUTRIN XL) 150 MG 24 hr tablet Take 2 tablets (300 mg) by mouth daily 60 tablet 3     clindamycin (CLINDAMAX) 1 % external gel Apply topically 2 times daily 60 g 0     diazepam (VALIUM) 5 MG tablet Take 1 tablet (5 mg) by mouth every 6 hours as needed (for flight anxiety) 5 tablet 0     emtricitabine-tenofovir (TRUVADA) 200-300 MG per tablet Take 1 tablet by mouth daily 90 tablet 0     EPINEPHrine (ANY BX GENERIC EQUIV) 0.3 MG/0.3ML injection 2-pack Inject 0.3 mg (0.3 ml) into the muscle once as needed for anaphylaxis. 2 mL 0     fluticasone (FLONASE) 50 MCG/ACT nasal spray Spray 1 spray into both nostrils daily       Krill Oil Omega-3 500 MG CAPS        lisinopril (ZESTRIL) 20 MG tablet Take 1 tablet (20 mg) by mouth daily 90 tablet 3     melatonin 5 MG tablet Take 1 tablet (5 mg) by mouth nightly as needed for sleep       multivitamin, therapeutic (THERA-VIT) TABS tablet Take 1 tablet by mouth daily       needle, disp, (BD HYPODERMIC NEEDLE) 18G X 1\" MISC USE TO DRAW UP HORMONES ONCE WEEKLY 100 each 2     Needle, Disp, 25G X 1-1/2\" MISC Use once weekly for administering hormone into the muscle. 100 " each 2     omeprazole (PRILOSEC) 20 MG DR capsule Take 1 capsule by mouth daily. 90 capsule 2     syringe, disposable, (B-D SYRINGE LUER-RADHA) 1 ML MISC USE ONCE WEEKLY TO DRAW UP HORMONES 100 each 2     testosterone cypionate (DEPOTESTOSTERONE) 200 MG/ML injection Inject 0.25 mLs (50 mg) into the muscle once a week 4 mL 5         EXAM:    Vitals: /78   Wt 116.6 kg (257 lb)   BMI 33.91 kg/m    BMI: Body mass index is 33.91 kg/m .    Constitutional:  Andrew Up is in no apparent distress, appears well-nourished.  Cooperative with history and physical exam.    Vascular:  Pedal pulses are palpable for both the DP and PT arteries.  CFT < 3 sec.  No edema.      Neuro: Light touch sensation is intact to the L4, L5, S1 distributions  No evidence of weakness, spasticity, or contracture in the lower extremities.     Derm: Normal texture and turgor.  No erythema, ecchymosis, or cyanosis.  No open lesions.  Hyperkeratotic lesion medial right hallux in the area of the bone spur.    Musculoskeletal:    Lower extremity muscle strength is normal. No gross deformities.  Widening of the medial aspect, right hallux interphalangeal joint.    X-Ray Findings:  I personally reviewed the right foot images.  See comments above    3 views right foot radiographs 2/16/2024 1:50 PM     History: R medial first toe hard prominence - eval for underlying bony  abnormality; Pain of toe of right foot     Comparison: None available.     Findings:     AP, oblique, and lateral  views of the right foot were obtained.      No acute osseous abnormality.       Ossicle and opposing cystic change at the first proximal phalangeal  head tibial aspect, likely sequelae of remote trauma.     Cystlike change, possible sequelae of erosion at the fifth proximal  phalangeal head. Mild degenerative change first metatarsophalangeal  joint.     Lisfranc articulation alignment is grossly congruent on these  non-weight bearing images.     Tiny Achilles tendon  insertional and plantar calcaneal enthesopathy.      Soft tissue is unremarkable.                                                                      Impression:  1. Ossicle and opposing cystic change at the first proximal phalangeal  head tibial aspect, likely sequelae of remote trauma.  2. Cystlike change, possible sequelae of erosion at the fifth proximal  phalangeal head.     KELSIE MONTAÑO       Again, thank you for allowing me to participate in the care of your patient.        Sincerely,        Marty Isidro DPM

## 2024-02-28 NOTE — PATIENT INSTRUCTIONS
Thank you for choosing [facility] Podiatry / Foot & Ankle Surgery!    DR. WHITNEY'S CLINIC LOCATIONS:     [facility] TRIAGE LINE: [phone]   [address] APPOINTMENTS: [phone]   [place] [address] RADIOLOGY: [phone]   (Every other Tues - Wed - Fri PM) SET UP SURGERY: [phone]    PHYSICAL THERAPY: [phone]   [place] SPECIALTY BILLING QUESTIONS: [phone] [address] FAX: [phone]   [place] [address]    (Thurs & Fri AM)        REVIEW OF SURGICAL RISKS  The following is a list of possible risks associated with foot and ankle surgery. This is not all-inclusive. Please realize that risks associated with elective foot surgery are low and there are ways to deal with them when they occur.     1) Infection:  Probably the most concerning and discussed risk of surgery, the chance of infection is about 1% with elective surgery. Often it involves the skin around the incision and resolves with oral antibiotics. It is rare that infection will be deep and require surgical intervention. You will receive an antibiotic prior to surgery.    2)  Pain: Surgery is trauma to the foot. Therefore a certain amount of pain is to be expected. This will be most bothersome during the first 1-2 days. Taking your pain medication, elevating the extremity above heart level, and using ice are all very important for adequate pain management.     3)  Swelling: This is due to the trauma of surgery. A certain degree of swelling is normal. However, if there is too much, it can impair healing, increase the risk of infection, add to your pain, and linger for several months after surgery making return to normal shoes difficult. Elevating the limb is extremely important.    4)  Healing Complications: There are many factors that can impair healing. There is no way to speed up the biological rate of healing. People tend to find ways to slow it down. Proper nutrition, not smoking, following the  instructions provided by your surgeon are ways to help with normal healing.    Sometimes the skin is slow to heal, and an open area along the incision develops.  If this happens, it cannot be stitched closed. It then needs to heal from the inside out. Rarely there will be an issue with bone healing, which might require a special device called a bone stimulator and/or a revision surgery.    5)  Temporary and Permanent Numbness: Numbness beyond the area of surgery is to be expected. Initially it is from the local anesthetic that was injected into your foot. This wears off within 24 hours. Continued numbness or tingling is usually from swelling that compresses the nerves. Numbness that lasts for weeks is from nerve injury/irritation. This might eventually resolve or be permanent.      6)  Blood Clot:  Although rare, this is potentially the most dangerous risk associated with foot surgery. A blood clot in the leg can lead to varicose veins and chronic swelling. A blood clot that travels to the lungs is a very serious condition requiring hospitalization. Increased risk is related to trauma of surgery and degree of immobilization. There are many other risk factors that are not related directly to surgery (smoking, family history, personal history of varicose veins and/or blood clots, cancer, high fat cholesterol and lipids). Your surgeon will discuss this with you and devise an appropriate plan to help prevent this complication.    7)  Hypertrophic Scar: Some people have skin that is prone to forming exaggerated scar tissue. This can be unsightly and uncomfortable. There are ways to treat it.        8)  Complex Regional Pain Syndrome:  Rarely some people have pain that is out of proportion to the surgical procedure and harder to get control of. This pain can linger and cause changes in skin temperature and appearance. If this occurs, physical therapy and/or a pain specialist might be needed.      9) There are also  "intra-operative challenges and complications that can occur.   Intra-operative challenges can involve finding poor bone quality, difficulty with the internal fixation (when used), and even inadvertent injury to neighboring structures that would then need to be repaired.     Please remember that surgical complications are rare. Your surgeon has a plan to deal with them, should one occur. The primary goal of surgery is pain reduction. There are no guarantees. An \"abnormal\" foot prior to surgery, is not necessarily a \"normal\" foot afterwards. Hopefully it is a less painful one.      If you have any questions, please discuss with Dr. Isidro prior to surgery.        POST-OPERATIVE CARE RECOMENDATIONS    ACTIVITY:    1)  Weight bearing status: __________________________    2)  Keep your surgical lower extremity elevated 23/24 hours above heart level. You will want to keep your foot elevated as much as possible for the first week after surgery.     3)  It is recommended that you get up and move around (walk, crutch, roll) for short periods each hour while you are awake. It is okay to wiggle your toes. If you are not in a splint or cast, move your ankle joint. Motion helps lower risk of a blood clot in your leg.      4)  If you bathe or shower, the dressing must be kept dry. Safety is a concern and sponge bathing might be best. You can purchase a water proof cast protector.     5)  You are not to drive while you are taking pain medications. No driving, if surgery was done on the right foot/ ankle or if you drive a stick shift.     SPECIFIC CARES:    1)  Keep the dressing clean, dry, and intact. If your dressing gets wet, comes apart, or falls off, please call your clinic and notify Dr. Isidro. Some bleeding through the dressing is okay. But if it causes a spot bigger than 2 inches in diameter, please notify Dr. Isidro.     2)  Place an ice pack behind the knee of the surgical side for up to 20min/hour. It can also be placed on " the front of the ankle.     4)  Call your clinic if you experience calf pain, chest pain, or problems breathing.    5)  Call your clinic if fever, increasing pain that you can't control or a large amount of bleeding.      6) What to do if your pain seems out of control:   1)  Make sure you are truly elevating the foot/ankle above heart level.   2)  Make sure you are using a cold pack/ ice   3)  Check the pain medication instructions:     Usually you can take two pain pills every four hours, if needed.   4)  If the above are not adequate, then you need to remove the brace/ boot and   remove the compression wrap. The wrap might be too tight. After you do   this, elevate the foot for an hour and then re-wrap the foot lightly and   replace the splint / boot.      Follow up in clinic one week after surgery. This appointment should already be set up.      MEDICATIONS:    IMPORTANT:  Take your pain medication when you get home, even if you are not having pain. You want it in your system before the local anesthetic (foot numbness from the shot) wears off.      1)  Take your pain medication with food. This will help prevent any nausea side effects.  If hydroxyzine was prescribed, it is for itching, leg spasms, and makes the other pain medication work better.    2)  Anti blood clot plan: To help prevent a blood clot in your legs, it is important that you wiggle your toes and ankles frequently. Obviously, this might be limited on the surgical side. Get up and move around a few minutes every hour while you are awake.  Keep the white sock on the non-surgical side and the compression wraps to the knee on the surgical side. If Dr. Isidro thinks that you are at high risk for a blood clot, he might put you on a blood thinner called enoxaparin.    Please call the clinic if you have any pain or swelling in either calf.        SHOWERING BEFORE SURGERY  You must wash with the soap (Hibiclens - 4% CHG) twice before coming to the hospital  for your surgery. This will decrease bacteria (germs) on your skin. It will also help reduce your chance of infection (illness caused by germs) after surgery.  Read the directions and safety tips on the bottle of soap. Wash once the evening before surgery and once the morning of surgery. Use 4 ounces of soap each time. When showering, it is best to use two fresh washcloths and a fresh towel.   Items you will need for showerin newly washed washcloths   2 newly washed towels   8 ounces of one of the soap  FOLLOW THESE INSTRUCTIONS:  The evening before surgery   1. Shower or bathe as you normally would, use your regular soap and a clean washcloth. Give special attention to places where your incision (surgical cut) or catheters will be. This includes your groin area. Rinse well. You may wash your hair with your regular shampoo.  2. Next, wash your entire body from your chin down with the antiseptic soap. See the next page for directions about how to do this.  3. Rinse well and dry off using a newly washed towel.  The morning of surgery  Repeat steps 1, 2 and 3.   Other suggestions:   Wear freshly washed pajamas or clothing after your evening shower.   Wear freshly washed clothes the day of surgery.   Wash and change your bed sheets the day before surgery to have clean bed sheets after you shower and when you get home from surgery.   If you have trouble washing all areas, make sure someone helps you.   Don t use any deodorant, lotion or powder after your shower.   Women who are menstruating should wear a fresh sanitary pad to the hospital.       **If you have questions or concerns after surgery, please call: Podiatry/Foot & Ankle Surgery Triage Team at the [facility] at [phone]..

## 2024-03-05 ENCOUNTER — TELEPHONE (OUTPATIENT)
Dept: PODIATRY | Facility: CLINIC | Age: 44
End: 2024-03-05

## 2024-03-05 NOTE — TELEPHONE ENCOUNTER
Patient has been scheduled for surgery. Details are below.    Date of Surgery: 06/04/24    Approximate Arrival Time: SURGERY CENTER WILL CALL 3/4 DAYS PRIOR TO CONFIRM A TIME   Surgeon: Dr. Marty Isidro    Procedure: Excision of bone spur osteophyte right great toe - Right    Location: [facility] [place] [address]  Surgery Consult: states he didn't need one  PreOp Physical: will call his pcp  PostOp: 06/12/24 & 06/19/24  Packet Mailed/MyChart Sent: yes  Added to [place]: yes

## 2024-04-04 DIAGNOSIS — F90.9 ATTENTION DEFICIT HYPERACTIVITY DISORDER (ADHD), UNSPECIFIED ADHD TYPE: ICD-10-CM

## 2024-04-05 RX ORDER — BUPROPION HYDROCHLORIDE 150 MG/1
300 TABLET ORAL EVERY MORNING
Qty: 60 TABLET | Refills: 11 | Status: SHIPPED | OUTPATIENT
Start: 2024-04-05 | End: 2024-08-21

## 2024-04-09 ENCOUNTER — MYC MEDICAL ADVICE (OUTPATIENT)
Dept: FAMILY MEDICINE | Facility: CLINIC | Age: 44
End: 2024-04-09
Payer: COMMERCIAL

## 2024-04-09 DIAGNOSIS — E66.01 MORBID OBESITY (H): Primary | ICD-10-CM

## 2024-04-18 DIAGNOSIS — Z20.6 EXPOSURE TO HUMAN IMMUNODEFICIENCY VIRUS: ICD-10-CM

## 2024-04-18 RX ORDER — EMTRICITABINE AND TENOFOVIR DISOPROXIL FUMARATE 200; 300 MG/1; MG/1
1 TABLET, FILM COATED ORAL DAILY
Qty: 90 TABLET | Refills: 0 | Status: SHIPPED | OUTPATIENT
Start: 2024-04-18 | End: 2024-05-27

## 2024-04-18 NOTE — TELEPHONE ENCOUNTER
"Request for medication refill:      emtricitabine-tenofovir (TRUVADA) 200-300 MG per tablet     Providers if patient needs an appointment and you are willing to give a one month supply please refill for one month and  send a letter/MyChart using \".SMILLIMITEDREFILL\" .smillimited and route chart to \"P SMI \" (Giving one month refill in non controlled medications is strongly recommended before denial)    If refill has been denied, meaning absolutely no refills without visit, please complete the smart phrase \".smirxrefuse\" and route it to the \"P SMI MED REFILLS\"  pool to inform the patient and the pharmacy.    Taylor Tobar MA     "

## 2024-05-15 ENCOUNTER — OFFICE VISIT (OUTPATIENT)
Dept: FAMILY MEDICINE | Facility: CLINIC | Age: 44
End: 2024-05-15
Payer: COMMERCIAL

## 2024-05-15 VITALS
TEMPERATURE: 98.5 F | OXYGEN SATURATION: 95 % | SYSTOLIC BLOOD PRESSURE: 131 MMHG | WEIGHT: 286 LBS | DIASTOLIC BLOOD PRESSURE: 76 MMHG | HEIGHT: 73 IN | RESPIRATION RATE: 16 BRPM | BODY MASS INDEX: 37.91 KG/M2 | HEART RATE: 76 BPM

## 2024-05-15 DIAGNOSIS — G47.33 OSA (OBSTRUCTIVE SLEEP APNEA): ICD-10-CM

## 2024-05-15 DIAGNOSIS — I10 BENIGN ESSENTIAL HYPERTENSION: ICD-10-CM

## 2024-05-15 DIAGNOSIS — Z01.818 PREOP GENERAL PHYSICAL EXAM: Primary | ICD-10-CM

## 2024-05-15 DIAGNOSIS — E66.812 CLASS 2 OBESITY WITHOUT SERIOUS COMORBIDITY WITH BODY MASS INDEX (BMI) OF 37.0 TO 37.9 IN ADULT, UNSPECIFIED OBESITY TYPE: ICD-10-CM

## 2024-05-15 PROCEDURE — 99213 OFFICE O/P EST LOW 20 MIN: CPT | Performed by: STUDENT IN AN ORGANIZED HEALTH CARE EDUCATION/TRAINING PROGRAM

## 2024-05-15 NOTE — PROGRESS NOTES
Preoperative Evaluation  [address] [address]  Phone: [phone]  Fax: [phone]  Primary Provider: Mitra Payton DO  Pre-op Performing Provider: Mitra Payton DO  May 15, 2024       Surgical Information  Surgery/Procedure: Excision of Bone Spur Osteophyte right great toe   Surgery Location: [facility]   Surgeon: Dr. Isidro  Surgery Date: June 4th, 2024  Time of Surgery: n/a  Where patient plans to recover: At home with family  Fax number for surgical facility: Note does not need to be faxed, will be available electronically in Epic.    Assessment & Plan     The proposed surgical procedure is considered INTERMEDIATE risk.    Preop general physical exam  Chronic conditions well controlled, no further workup indicated     Benign essential hypertension  Controlled on ACEI    Class 2 obesity without serious comorbidity with body mass index (BMI) of 37.0 to 37.9 in adult, unspecified obesity type  Tolerating GLP1 restart at low dose without issue     ABRAM (obstructive sleep apnea)  Hx of ABRAM with intermittent CPAP         - No identified additional risk factors other than previously addressed    Preoperative Medication Instructions  Antiplatelet or Anticoagulation Medication Instructions   - Patient is on no antiplatelet or anticoagulation medications.    Additional Medication Instructions  Take all scheduled medications on the day of surgery - takes meds in the evening so will not need to hold anything morning of     Recommendation  Approval given to proceed with proposed procedure, without further diagnostic evaluation.        Pineda Morales is a 43 year old, presenting for the following:  Pre-Op Exam (Right toe surgery )          5/15/2024     4:14 PM   Additional Questions   Roomed by Nuvia   Accompanied by self         5/15/2024    Information    services provided? No     HPI related to upcoming procedure:      Anesthesia: MAC with Local        5/14/2024   Pre-Op Questionnaire   Have you ever had a heart attack or stroke? No   Have you ever had surgery on your heart or blood vessels, such as a stent placement, a coronary artery bypass, or surgery on an artery in your head, neck, heart, or legs? No   Do you have chest pain with activity? No   Do you have a history of heart failure? No   Do you currently have a cold, bronchitis or symptoms of other infection? No   Do you have a cough, shortness of breath, or wheezing? No   Do you or anyone in your family have previous history of blood clots? No   Do you or does anyone in your family have a serious bleeding problem such as prolonged bleeding following surgeries or cuts? No   Have you ever had problems with anemia or been told to take iron pills? No   Have you had any abnormal blood loss such as black, tarry or bloody stools? No   Have you ever had a blood transfusion? No   Are you willing to have a blood transfusion if it is medically needed before, during, or after your surgery? Yes   Have you or any of your relatives ever had problems with anesthesia? No   Do you have sleep apnea, excessive snoring or daytime drowsiness? No   Do you have any artifical heart valves or other implanted medical devices like a pacemaker, defibrillator, or continuous glucose monitor? No   Do you have artificial joints? No   Are you allergic to latex? No           Status of Chronic Conditions:  See problem list for active medical problems.  Problems all longstanding and stable, except as noted/documented.  See ROS for pertinent symptoms related to these conditions.    Patient Active Problem List    Diagnosis Date Noted    Benign essential hypertension 05/16/2024     Priority: Medium    Lateral epicondylitis, unspecified laterality 03/29/2023     Priority: Medium    Class 2 obesity in adult 08/26/2021     Priority: Medium    Testosterone deficiency 03/24/2016     Priority: Medium    PReP  "therapy 03/17/2016     Priority: Medium     Monogamous with HIV+ partner with undetectable viral load. Given very low risk, HIV labs q6mos per shared decision making       Low testosterone 03/17/2016     Priority: Medium     Patient has single testicle, not interested in banking sperm, lost testicle from infection      Mixed hyperlipidemia 03/17/2016     Priority: Medium    Gastroesophageal reflux disease 05/04/2010     Priority: Medium      Past Medical History:   Diagnosis Date    Gastroesophageal reflux disease     Hyperlipidemia      Past Surgical History:   Procedure Laterality Date    HERNIA REPAIR      ORCHIECTOMY INGUINAL Left 2011    SEPTOPLASTY      2010 about    VARICOCELECTOMY Left 2009    ** repair     Current Outpatient Medications   Medication Sig Dispense Refill    albuterol (PROAIR HFA/PROVENTIL HFA/VENTOLIN HFA) 108 (90 Base) MCG/ACT inhaler Inhale 2 puffs by mouth into the lungs every 6 hours. 8.5 g 2    atorvastatin (LIPITOR) 20 MG tablet Take 1 tablet by mouth once daily. 90 tablet 3    azithromycin (ZITHROMAX) 500 MG tablet Take 1 tablet by mouth daily for up to 3 days as needed for traveler's diarrhea. 3 tablet 0    BD DISP NEEDLES 22G X 1-1/2\" MISC USE ONCE WEEKLY FOR ADMINISTERING HORMONE INTRAMUSCULARLY 25 each 3    buPROPion (WELLBUTRIN XL) 150 MG 24 hr tablet Take 2 tablets (300 mg) by mouth every morning 60 tablet 11    clindamycin (CLINDAMAX) 1 % external gel Apply topically 2 times daily 60 g 0    diazepam (VALIUM) 5 MG tablet Take 1 tablet (5 mg) by mouth every 6 hours as needed (for flight anxiety) 5 tablet 0    emtricitabine-tenofovir (TRUVADA) 200-300 MG per tablet Take 1 tablet by mouth daily. 90 tablet 0    EPINEPHrine (ANY BX GENERIC EQUIV) 0.3 MG/0.3ML injection 2-pack Inject 0.3 mg (0.3 ml) into the muscle once as needed for anaphylaxis. 2 mL 0    fluticasone (FLONASE) 50 MCG/ACT nasal spray Spray 1 spray into both nostrils daily      Krill Oil Omega-3 500 MG CAPS       " "lisinopril (ZESTRIL) 20 MG tablet Take 1 tablet (20 mg) by mouth daily 90 tablet 3    melatonin 5 MG tablet Take 1 tablet (5 mg) by mouth nightly as needed for sleep      multivitamin, therapeutic (THERA-VIT) TABS tablet Take 1 tablet by mouth daily      needle, disp, (BD HYPODERMIC NEEDLE) 18G X 1\" MISC USE TO DRAW UP HORMONES ONCE WEEKLY 100 each 2    Needle, Disp, 25G X 1-1/2\" MISC Use once weekly for administering hormone into the muscle. 100 each 2    omeprazole (PRILOSEC) 20 MG DR capsule Take 1 capsule by mouth daily. 90 capsule 2    Semaglutide-Weight Management (WEGOVY) 0.5 MG/0.5ML pen Inject 0.5 mg Subcutaneous once a week for 28 days 2 mL 0    [START ON 2024] Semaglutide-Weight Management (WEGOVY) 1 MG/0.5ML pen Inject 1 mg Subcutaneous once a week for 28 days 2 mL 0    syringe, disposable, (B-D SYRINGE LUER-RADHA) 1 ML MISC USE ONCE WEEKLY TO DRAW UP HORMONES 100 each 2    testosterone cypionate (DEPOTESTOSTERONE) 200 MG/ML injection Inject 0.25 mLs (50 mg) into the muscle once a week 4 mL 5       Allergies   Allergen Reactions    Bees Anaphylaxis    Penicillins Anaphylaxis    Clindamycin     Fenofibrate Muscle Pain (Myalgia)        Social History     Tobacco Use    Smoking status: Every Day     Current packs/day: 0.00     Average packs/day: 0.5 packs/day for 12.0 years (6.0 ttl pk-yrs)     Types: Cigarettes     Start date: 2009     Last attempt to quit: 2021     Years since quittin.8    Smokeless tobacco: Never    Tobacco comments:     using Wellbutrin   Substance Use Topics    Alcohol use: Yes     Alcohol/week: 0.0 standard drinks of alcohol     Comment: 3-4 times per month, 1-2 drinks     Family History   Problem Relation Age of Onset    Hyperlipidemia Mother     Depression Mother     Sleep Apnea Mother     Hyperlipidemia Father     Anxiety Disorder Father     Substance Abuse Father     Hypertension Maternal Grandmother     Anxiety Disorder Maternal Grandmother     Hypertension Maternal " "Grandfather     Diabetes Maternal Grandfather     Substance Abuse Paternal Grandfather     Cerebrovascular Disease Paternal Grandfather      History   Drug Use No             Review of Systems  Constitutional, HEENT, cardiovascular, pulmonary, gi and gu systems are negative, except as otherwise noted.    Objective    /76   Pulse 76   Temp 98.5  F (36.9  C) (Oral)   Resp 16   Ht 1.854 m (6' 1\")   Wt 129.7 kg (286 lb)   SpO2 95%   BMI 37.73 kg/m     Estimated body mass index is 37.73 kg/m  as calculated from the following:    Height as of this encounter: 1.854 m (6' 1\").    Weight as of this encounter: 129.7 kg (286 lb).  Physical Exam  GENERAL: alert and no distress  HENT: clear oropharynx, mallampati 3  NECK: no adenopathy, no asymmetry, masses, or scars  RESP: lungs clear to auscultation - no rales, rhonchi or wheezes  CV: regular rate and rhythm, normal S1 S2, no S3 or S4, no murmur, click or rub, no peripheral edema  MS: no gross musculoskeletal defects noted, no edema  NEURO: Normal strength and tone, mentation intact and speech normal    Recent Labs   Lab Test 02/20/24  0834 08/24/23  1610 05/25/23  1556   HGB 16.3  --   --    NA  --  138 138   POTASSIUM  --  4.1 4.2   CR  --  1.14 1.10        Diagnostics  No labs were ordered during this visit.   No EKG required, no history of coronary heart disease, significant arrhythmia, peripheral arterial disease or other structural heart disease.    Revised Cardiac Risk Index (RCRI)  The patient has the following serious cardiovascular risks for perioperative complications:   - No serious cardiac risks = 0 points     RCRI Interpretation: 0 points: Class I (very low risk - 0.4% complication rate)         Signed Electronically by: Mitra Payton DO  Copy of this evaluation report is provided to requesting physician.         "

## 2024-05-16 PROBLEM — R10.32 LEFT GROIN PAIN: Status: RESOLVED | Noted: 2021-09-09 | Resolved: 2024-05-16

## 2024-05-16 PROBLEM — I10 BENIGN ESSENTIAL HYPERTENSION: Status: ACTIVE | Noted: 2024-05-16

## 2024-05-16 NOTE — PATIENT INSTRUCTIONS

## 2024-05-27 ENCOUNTER — MYC REFILL (OUTPATIENT)
Dept: FAMILY MEDICINE | Facility: CLINIC | Age: 44
End: 2024-05-27
Payer: COMMERCIAL

## 2024-05-27 DIAGNOSIS — Z20.6 EXPOSURE TO HUMAN IMMUNODEFICIENCY VIRUS: ICD-10-CM

## 2024-05-28 RX ORDER — EMTRICITABINE AND TENOFOVIR DISOPROXIL FUMARATE 200; 300 MG/1; MG/1
1 TABLET, FILM COATED ORAL DAILY
Qty: 90 TABLET | Refills: 0 | Status: SHIPPED | OUTPATIENT
Start: 2024-05-28 | End: 2024-08-30

## 2024-05-28 NOTE — NURSING NOTE
"Chief Complaint   Patient presents with     Recheck Medication     Derm Problem       Initial /80 (BP Location: Right arm, Cuff Size: Adult Large)  Pulse 81  Temp 97.5  F (36.4  C) (Oral)  Ht 6' 1\" (1.854 m)  Wt 269 lb (122 kg)  SpO2 97%  BMI 35.49 kg/m2 Estimated body mass index is 35.49 kg/(m^2) as calculated from the following:    Height as of this encounter: 6' 1\" (1.854 m).    Weight as of this encounter: 269 lb (122 kg).  Medication Reconciliation: complete   Janee Birch MA    " Detail Level: Detailed Quality 226: Preventive Care And Screening: Tobacco Use: Screening And Cessation Intervention: Patient screened for tobacco use and is an ex/non-smoker

## 2024-05-28 NOTE — TELEPHONE ENCOUNTER
"Request for medication refill: [facility]    Providers if patient needs an appointment and you are willing to give a one month supply please refill for one month and  send a letter/MyChart using \".SMILLIMITEDREFILL\" .smillimited and route chart to \"P SMI \" (Giving one month refill in non controlled medications is strongly recommended before denial)    If refill has been denied, meaning absolutely no refills without visit, please complete the smart phrase \".smirxrefuse\" and route it to the \"P SMI MED REFILLS\"  pool to inform the patient and the pharmacy.    Trace Key, CMA    "

## 2024-06-03 ENCOUNTER — ANESTHESIA EVENT (OUTPATIENT)
Dept: SURGERY | Facility: CLINIC | Age: 44
End: 2024-06-03
Payer: COMMERCIAL

## 2024-06-04 ENCOUNTER — ANESTHESIA (OUTPATIENT)
Dept: SURGERY | Facility: CLINIC | Age: 44
End: 2024-06-04
Payer: COMMERCIAL

## 2024-06-04 ENCOUNTER — APPOINTMENT (OUTPATIENT)
Dept: GENERAL RADIOLOGY | Facility: CLINIC | Age: 44
End: 2024-06-04
Attending: PODIATRIST
Payer: COMMERCIAL

## 2024-06-04 ENCOUNTER — HOSPITAL ENCOUNTER (OUTPATIENT)
Facility: CLINIC | Age: 44
Discharge: HOME | End: 2024-06-04
Attending: PODIATRIST | Admitting: PODIATRIST
Payer: COMMERCIAL

## 2024-06-04 VITALS
DIASTOLIC BLOOD PRESSURE: 75 MMHG | SYSTOLIC BLOOD PRESSURE: 121 MMHG | TEMPERATURE: 98 F | HEIGHT: 73 IN | WEIGHT: 260 LBS | BODY MASS INDEX: 34.46 KG/M2 | RESPIRATION RATE: 14 BRPM | HEART RATE: 85 BPM | OXYGEN SATURATION: 95 %

## 2024-06-04 DIAGNOSIS — K20.90 ESOPHAGITIS: ICD-10-CM

## 2024-06-04 DIAGNOSIS — K44.9 HIATAL HERNIA: ICD-10-CM

## 2024-06-04 DIAGNOSIS — Z98.890 POSTOPERATIVE STATE: ICD-10-CM

## 2024-06-04 DIAGNOSIS — G89.18 ACUTE POST-OPERATIVE PAIN: Primary | ICD-10-CM

## 2024-06-04 PROCEDURE — 370N000017 HC ANESTHESIA TECHNICAL FEE, PER MIN: Performed by: PODIATRIST

## 2024-06-04 PROCEDURE — 250N000011 HC RX IP 250 OP 636: Performed by: NURSE ANESTHETIST, CERTIFIED REGISTERED

## 2024-06-04 PROCEDURE — 28124 PARTIAL REMOVAL OF TOE: CPT | Performed by: ANESTHESIOLOGY

## 2024-06-04 PROCEDURE — 250N000011 HC RX IP 250 OP 636: Performed by: PODIATRIST

## 2024-06-04 PROCEDURE — 250N000009 HC RX 250: Performed by: PODIATRIST

## 2024-06-04 PROCEDURE — 28124 PARTIAL REMOVAL OF TOE: CPT | Mod: T5 | Performed by: PODIATRIST

## 2024-06-04 PROCEDURE — 272N000001 HC OR GENERAL SUPPLY STERILE: Performed by: PODIATRIST

## 2024-06-04 PROCEDURE — 999N000065 XR FOOT PORT RIGHT 3 VIEWS: Mod: RT

## 2024-06-04 PROCEDURE — 250N000009 HC RX 250: Performed by: NURSE ANESTHETIST, CERTIFIED REGISTERED

## 2024-06-04 PROCEDURE — 28124 PARTIAL REMOVAL OF TOE: CPT | Performed by: NURSE ANESTHETIST, CERTIFIED REGISTERED

## 2024-06-04 PROCEDURE — 258N000003 HC RX IP 258 OP 636: Performed by: NURSE ANESTHETIST, CERTIFIED REGISTERED

## 2024-06-04 PROCEDURE — 710N000009 HC RECOVERY PHASE 1, LEVEL 1, PER MIN: Performed by: PODIATRIST

## 2024-06-04 PROCEDURE — 999N000179 XR SURGERY CARM FLUORO LESS THAN 5 MIN W STILLS

## 2024-06-04 PROCEDURE — 999N000141 HC STATISTIC PRE-PROCEDURE NURSING ASSESSMENT: Performed by: PODIATRIST

## 2024-06-04 PROCEDURE — 710N000012 HC RECOVERY PHASE 2, PER MINUTE: Performed by: PODIATRIST

## 2024-06-04 PROCEDURE — 258N000003 HC RX IP 258 OP 636: Performed by: PODIATRIST

## 2024-06-04 PROCEDURE — 360N000075 HC SURGERY LEVEL 2, PER MIN: Performed by: PODIATRIST

## 2024-06-04 RX ORDER — FENTANYL CITRATE 50 UG/ML
INJECTION, SOLUTION INTRAMUSCULAR; INTRAVENOUS PRN
Status: DISCONTINUED | OUTPATIENT
Start: 2024-06-04 | End: 2024-06-04

## 2024-06-04 RX ORDER — IBUPROFEN 600 MG/1
600 TABLET, FILM COATED ORAL EVERY 6 HOURS PRN
Qty: 28 TABLET | Refills: 0 | Status: SHIPPED | OUTPATIENT
Start: 2024-06-04 | End: 2024-06-28

## 2024-06-04 RX ORDER — ACETAMINOPHEN 500 MG
500-1000 TABLET ORAL EVERY 8 HOURS PRN
Qty: 42 TABLET | Refills: 0 | Status: SHIPPED | OUTPATIENT
Start: 2024-06-04 | End: 2024-06-28

## 2024-06-04 RX ORDER — DEXAMETHASONE SODIUM PHOSPHATE 4 MG/ML
INJECTION, SOLUTION INTRA-ARTICULAR; INTRALESIONAL; INTRAMUSCULAR; INTRAVENOUS; SOFT TISSUE PRN
Status: DISCONTINUED | OUTPATIENT
Start: 2024-06-04 | End: 2024-06-04

## 2024-06-04 RX ORDER — MAGNESIUM HYDROXIDE 1200 MG/15ML
LIQUID ORAL PRN
Status: DISCONTINUED | OUTPATIENT
Start: 2024-06-04 | End: 2024-06-04 | Stop reason: HOSPADM

## 2024-06-04 RX ORDER — ONDANSETRON 2 MG/ML
INJECTION INTRAMUSCULAR; INTRAVENOUS PRN
Status: DISCONTINUED | OUTPATIENT
Start: 2024-06-04 | End: 2024-06-04

## 2024-06-04 RX ORDER — OXYCODONE HYDROCHLORIDE 5 MG/1
5 TABLET ORAL EVERY 6 HOURS PRN
Qty: 12 TABLET | Refills: 0 | Status: SHIPPED | OUTPATIENT
Start: 2024-06-04 | End: 2024-06-07

## 2024-06-04 RX ORDER — SODIUM CHLORIDE, SODIUM LACTATE, POTASSIUM CHLORIDE, CALCIUM CHLORIDE 600; 310; 30; 20 MG/100ML; MG/100ML; MG/100ML; MG/100ML
INJECTION, SOLUTION INTRAVENOUS CONTINUOUS PRN
Status: DISCONTINUED | OUTPATIENT
Start: 2024-06-04 | End: 2024-06-04

## 2024-06-04 RX ORDER — PROPOFOL 10 MG/ML
INJECTION, EMULSION INTRAVENOUS PRN
Status: DISCONTINUED | OUTPATIENT
Start: 2024-06-04 | End: 2024-06-04

## 2024-06-04 RX ORDER — BUPIVACAINE HYDROCHLORIDE 5 MG/ML
INJECTION, SOLUTION PERINEURAL PRN
Status: DISCONTINUED | OUTPATIENT
Start: 2024-06-04 | End: 2024-06-04 | Stop reason: HOSPADM

## 2024-06-04 RX ORDER — LIDOCAINE HYDROCHLORIDE 20 MG/ML
INJECTION, SOLUTION INFILTRATION; PERINEURAL PRN
Status: DISCONTINUED | OUTPATIENT
Start: 2024-06-04 | End: 2024-06-04

## 2024-06-04 RX ORDER — PROPOFOL 10 MG/ML
INJECTION, EMULSION INTRAVENOUS CONTINUOUS PRN
Status: DISCONTINUED | OUTPATIENT
Start: 2024-06-04 | End: 2024-06-04

## 2024-06-04 RX ADMIN — VANCOMYCIN HYDROCHLORIDE 2000 MG: 1 INJECTION, POWDER, LYOPHILIZED, FOR SOLUTION INTRAVENOUS at 07:49

## 2024-06-04 RX ADMIN — PHENYLEPHRINE HYDROCHLORIDE 150 MCG: 10 INJECTION INTRAVENOUS at 09:53

## 2024-06-04 RX ADMIN — MIDAZOLAM 2 MG: 1 INJECTION INTRAMUSCULAR; INTRAVENOUS at 09:13

## 2024-06-04 RX ADMIN — PHENYLEPHRINE HYDROCHLORIDE 150 MCG: 10 INJECTION INTRAVENOUS at 09:48

## 2024-06-04 RX ADMIN — PROPOFOL 30 MG: 10 INJECTION, EMULSION INTRAVENOUS at 09:16

## 2024-06-04 RX ADMIN — SODIUM CHLORIDE, POTASSIUM CHLORIDE, SODIUM LACTATE AND CALCIUM CHLORIDE: 600; 310; 30; 20 INJECTION, SOLUTION INTRAVENOUS at 09:11

## 2024-06-04 RX ADMIN — PHENYLEPHRINE HYDROCHLORIDE 100 MCG: 10 INJECTION INTRAVENOUS at 09:39

## 2024-06-04 RX ADMIN — FENTANYL CITRATE 50 MCG: 50 INJECTION INTRAMUSCULAR; INTRAVENOUS at 09:13

## 2024-06-04 RX ADMIN — PHENYLEPHRINE HYDROCHLORIDE 100 MCG: 10 INJECTION INTRAVENOUS at 09:33

## 2024-06-04 RX ADMIN — PROPOFOL 100 MCG/KG/MIN: 10 INJECTION, EMULSION INTRAVENOUS at 09:13

## 2024-06-04 RX ADMIN — FENTANYL CITRATE 50 MCG: 50 INJECTION INTRAMUSCULAR; INTRAVENOUS at 09:15

## 2024-06-04 RX ADMIN — PROPOFOL 50 MG: 10 INJECTION, EMULSION INTRAVENOUS at 09:14

## 2024-06-04 RX ADMIN — LIDOCAINE HYDROCHLORIDE 60 MG: 20 INJECTION, SOLUTION INFILTRATION; PERINEURAL at 09:13

## 2024-06-04 RX ADMIN — ONDANSETRON 4 MG: 2 INJECTION INTRAMUSCULAR; INTRAVENOUS at 09:16

## 2024-06-04 RX ADMIN — DEXAMETHASONE SODIUM PHOSPHATE 4 MG: 4 INJECTION, SOLUTION INTRA-ARTICULAR; INTRALESIONAL; INTRAMUSCULAR; INTRAVENOUS; SOFT TISSUE at 09:16

## 2024-06-04 ASSESSMENT — ACTIVITIES OF DAILY LIVING (ADL)
ADLS_ACUITY_SCORE: 33
ADLS_ACUITY_SCORE: 35

## 2024-06-04 NOTE — ANESTHESIA CARE TRANSFER NOTE
Patient: Andrew Up    Procedure: Procedure(s):  Excision of bone spur osteophyte right great toe       Diagnosis: Osteophyte, unspecified joint [M25.70]  Pain of toe of right foot [M79.674]  Diagnosis Additional Information: No value filed.    Anesthesia Type:   MAC     Note:    Oropharynx: oropharynx clear of all foreign objects and spontaneously breathing  Level of Consciousness: awake  Oxygen Supplementation: face mask  Level of Supplemental Oxygen (L/min / FiO2): 8  Independent Airway: airway patency satisfactory and stable  Dentition: dentition unchanged  Vital Signs Stable: post-procedure vital signs reviewed and stable  Report to RN Given: handoff report given  Patient transferred to: PACU    Handoff Report: Identifed the Patient, Identified the Reponsible Provider, Reviewed the pertinent medical history, Discussed the surgical course, Reviewed Intra-OP anesthesia mangement and issues during anesthesia, Set expectations for post-procedure period and Allowed opportunity for questions and acknowledgement of understanding      Vitals:  Vitals Value Taken Time   /63 06/04/24 1000   Temp     Pulse 81 06/04/24 1002   Resp 12 06/04/24 1002   SpO2 94 % 06/04/24 1002   Vitals shown include unfiled device data.    Electronically Signed By: GISELLA Mckinney CRNA  June 4, 2024  10:03 AM

## 2024-06-04 NOTE — ANESTHESIA PREPROCEDURE EVALUATION
Anesthesia Pre-Procedure Evaluation    Patient: Andrew Up   MRN: [id number] : 1980        Procedure : Procedure(s):  Excision of bone spur osteophyte right great toe          Past Medical History:   Diagnosis Date    Gastroesophageal reflux disease     Hyperlipidemia       Past Surgical History:   Procedure Laterality Date    HERNIA REPAIR      ORCHIECTOMY INGUINAL Left 2011    SEPTOPLASTY      2010 about    VARICOCELECTOMY Left 2009    ** repair      Allergies   Allergen Reactions    Bee Venom Anaphylaxis     (abs fr [facility]  Rc[facility])    Bees Anaphylaxis    Penicillins Anaphylaxis    Sulfamethoxazole-Trimethoprim Anaphylaxis     (abs [facility] trsf  rcds)    Clindamycin     Fenofibrate Muscle Pain (Myalgia)    Sulfa Antibiotics Hives      Social History     Tobacco Use    Smoking status: Every Day     Current packs/day: 0.00     Average packs/day: 0.5 packs/day for 12.0 years (6.0 ttl pk-yrs)     Types: Cigarettes     Start date: 2009     Last attempt to quit: 2021     Years since quittin.9    Smokeless tobacco: Never    Tobacco comments:     using Wellbutrin   Substance Use Topics    Alcohol use: Yes     Alcohol/week: 0.0 standard drinks of alcohol     Comment: 3-4 times per month, 1-2 drinks      Wt Readings from Last 1 Encounters:   24 117.9 kg (260 lb)        Anesthesia Evaluation   Pt has had prior anesthetic.     No history of anesthetic complications       ROS/MED HX  ENT/Pulmonary:    (-) sleep apnea   Neurologic:    (-) no CVA   Cardiovascular:     (+) Dyslipidemia hypertension- -   -  - -                                   (-) CAD   METS/Exercise Tolerance:     Hematologic:       Musculoskeletal:       GI/Hepatic:     (+) GERD,                   Renal/Genitourinary:       Endo:     (+)               Obesity,    (-) Type II DM   Psychiatric/Substance Use:       Infectious Disease:       Malignancy:       Other:            Physical Exam    Airway        Mallampati: II   TM distance: >  "3 FB   Neck ROM: full   Mouth opening: > 3 cm    Respiratory Devices and Support         Dental  no notable dental history     (+) Minor Abnormalities - some fillings, tiny chips      Cardiovascular   cardiovascular exam normal          Pulmonary   pulmonary exam normal                OUTSIDE LABS:  CBC:   Lab Results   Component Value Date    WBC 5.4 09/08/2017    WBC 6.4 03/24/2016    HGB 16.3 02/20/2024    HGB 17.1 03/24/2023    HCT 46.7 09/08/2017    HCT 43.8 03/24/2016     09/08/2017     03/24/2016     BMP:   Lab Results   Component Value Date     08/24/2023     05/25/2023    POTASSIUM 4.1 08/24/2023    POTASSIUM 4.2 05/25/2023    CHLORIDE 100 08/24/2023    CHLORIDE 101 05/25/2023    CO2 26 08/24/2023    CO2 23 05/25/2023    BUN 21.1 (H) 08/24/2023    BUN 17.6 05/25/2023    CR 1.14 08/24/2023    CR 1.10 05/25/2023     (H) 02/20/2024     (H) 08/24/2023     COAGS: No results found for: \"PTT\", \"INR\", \"FIBR\"  POC: No results found for: \"BGM\", \"HCG\", \"HCGS\"  HEPATIC:   Lab Results   Component Value Date    ALBUMIN 5.0 03/24/2023    PROTTOTAL 7.9 03/24/2023    ALT 50 03/24/2023    AST 29 03/24/2023    ALKPHOS 75 03/24/2023    BILITOTAL 0.7 03/24/2023     OTHER:   Lab Results   Component Value Date    MIGUEL 9.3 08/24/2023    TSH 1.90 11/11/2022       Anesthesia Plan    ASA Status:  2    NPO Status:  NPO Appropriate    Anesthesia Type: MAC.     - Reason for MAC: straight local not clinically adequate              Consents            Postoperative Care    Pain management: Multi-modal analgesia.   PONV prophylaxis: Ondansetron (or other 5HT-3), Background Propofol Infusion     Comments:               Niki Fan MD, MD    I have reviewed the pertinent notes and labs in the chart from the past 30 days and (re)examined the patient.  Any updates or changes from those notes are reflected in this note.              # Obesity: Estimated body mass index is 34.3 kg/m  as calculated " "from the following:    Height as of this encounter: 1.854 m (6' 1\").    Weight as of this encounter: 117.9 kg (260 lb).      "

## 2024-06-04 NOTE — DISCHARGE INSTRUCTIONS
**If you have questions or concerns about your procedure,  call Dr. Isdiro at [phone]**       Same Day Surgery Discharge Instructions for  Sedation and General Anesthesia     It's not unusual to feel dizzy, light-headed or faint for up to 24 hours after surgery or while taking pain medication.  If you have these symptoms: sit for a few minutes before standing and have someone assist you when you get up to walk or use the bathroom.    You should rest and relax for the next 24 hours. We recommend you make arrangements to have an adult stay with you for at least 24 hours after your discharge.  Avoid hazardous and strenuous activity.    DO NOT DRIVE any vehicle or operate mechanical equipment for 24 hours following the end of your surgery.  Even though you may feel normal, your reactions may be affected by the medication you have received.    Do not drink alcoholic beverages for 24 hours following surgery.     Slowly progress to your regular diet as you feel able. It's not unusual to feel nauseated and/or vomit after receiving anesthesia.  If you develop these symptoms, drink clear liquids (apple juice, ginger ale, broth, 7-up, etc. ) until you feel better.  If your nausea and vomiting persists for 24 hours, please notify your surgeon.      All narcotic pain medications, along with inactivity and anesthesia, can cause constipation. Drinking plenty of liquids and increasing fiber intake will help.    For any questions of a medical nature, call your surgeon.    Do not make important decisions for 24 hours.    If you had general anesthesia, you may have a sore throat for a couple of days related to the breathing tube used during surgery.  You may use Cepacol lozenges to help with this discomfort.  If it worsens or if you develop a fever, contact your surgeon.     If you feel your pain is not well managed with the pain medications prescribed by your surgeon, please contact your surgeon's office to let them know so they  can address your concerns.

## 2024-06-04 NOTE — OP NOTE
Operative Report    June 4, 2024        SURGEON:  Marty Isidro DPM, JOSEFINA, MS    PREOPERATIVE DIAGNOSIS:   1) osteophyte/bone spur medial right great toe    POSTOPERATIVE DIAGNOSIS: Same    PROCEDURE(S):  1) excision of osteophyte with bony remodeling of the medial aspect, right great toe    SPECIMENS: A 0.6 cm in length by 0.3 cm in height osteophyte was removed    ANESTHESIA: MAC with local    HEMOSTASIS: Ankle pneumatic tourniquet at 250 mmHg    ESTIMATED BLOOD LOSS: 1 mL    MATERIALS: Absorbable nonabsorbable suture material    INJECTABLES:  0.5% Marcaine injected pre-operatively    COMPLICATIONS:   None apparent    INTRAOPERATIVE FINDINGS: Consistent with the previous x-rays, there is an oval shaped osteophyte overlying the medial aspect of the right hallux interphalangeal joint.  There is some bony irregularities deep and around this.    INDICATIONS FOR SURGERY:  Andrew Up is a 43-year-old male who saw me previously in clinic for ongoing and progressive pain related to a lump on the medial aspect of his right great toe.  The skin was inflamed with evidence of breakdown.  X-ray images showed an osteophyte in this region.  He inquired about surgical intervention, finding that accommodative footwear was not providing adequate protection of his skin and pain relief.  The procedure was discussed in detail.  This included the risks, the benefits, the postoperative cares, course and prognosis.  No guarantees were given.  For details regarding the most recent clinical exam and discussion please refer to the clinic note from 2/28/2024.      PROCEDURE: Andrew Up was transported to the operating room and placed supine on the operating table.  IV sedation was initiated.  Local anesthetic was injected proximal to the right hallux.  The right foot was prepped and draped in the normal aseptic fashion.  A timeout was called.  The ankle pneumatic tourniquet was inflated.    A longitudinal incision was made  overlying the medial lump on the right great toe.  Layer dissection was performed.  Bleeding vessels were electrocauterized.  With subcapsular and subperiosteal reflection, the osteophyte was identified and removed.  Measurements are noted above.  Palpation of the medial bony structures revealed some irregularities.  This was remodeled with a bone rongeur and rasp.  Intraoperative imaging was used to confirm removal of the osteophyte as well as remodeling of the medial aspect of the phalanges, primarily the proximal.    The wound was irrigated with a copious amount of normal sterile saline.  Layered closure was performed.  A well-padded compressive dressing was placed.    Counts were correct.  EBL 1 mL.  No complications.    Jerzy Up tolerated the anesthesia and procedure well.

## 2024-06-04 NOTE — TELEPHONE ENCOUNTER
"Request for medication refill:  omeprazole (PRILOSEC) 20 MG DR capsule     Providers if patient needs an appointment and you are willing to give a one month supply please refill for one month and  send a letter/MyChart using \".SMILLIMITEDREFILL\" .smillimited and route chart to \"P [facility] \" (Giving one month refill in non controlled medications is strongly recommended before denial)    If refill has been denied, meaning absolutely no refills without visit, please complete the smart phrase \".smirxrefuse\" and route it to the \"P [facility] MED REFILLS\"  pool to inform the patient and the pharmacy.    Tramaine Ferro, CMA      "

## 2024-06-04 NOTE — ANESTHESIA POSTPROCEDURE EVALUATION
Patient: Andrew Up    Procedure: Procedure(s):  Excision of bone spur osteophyte right great toe       Anesthesia Type:  MAC    Note:     Postop Pain Control: Uneventful            Sign Out: Well controlled pain   PONV: No   Neuro/Psych: Uneventful            Sign Out: Acceptable/Baseline neuro status   Airway/Respiratory: Uneventful            Sign Out: Acceptable/Baseline resp. status   CV/Hemodynamics: Uneventful            Sign Out: Acceptable CV status; No obvious hypovolemia; No obvious fluid overload   Other NRE:    DID A NON-ROUTINE EVENT OCCUR? No           Last vitals:  Vitals Value Taken Time   /66 06/04/24 1030   Temp 36.7  C (98  F) 06/04/24 1000   Pulse 79 06/04/24 1036   Resp 14 06/04/24 1036   SpO2 95 % 06/04/24 1036   Vitals shown include unfiled device data.    Electronically Signed By: Niki Fan MD, MD  June 4, 2024  10:56 AM

## 2024-06-05 ENCOUNTER — MYC MEDICAL ADVICE (OUTPATIENT)
Dept: PODIATRY | Facility: CLINIC | Age: 44
End: 2024-06-05
Payer: COMMERCIAL

## 2024-06-06 NOTE — TELEPHONE ENCOUNTER
Patient is s/p excision of bone spur osteophyte right great toe, 6/4/24.     Please see patient MyChart message and advise on response regarding numbness and dressing.     Greer Ramos, ATC

## 2024-06-12 ENCOUNTER — OFFICE VISIT (OUTPATIENT)
Dept: PODIATRY | Facility: CLINIC | Age: 44
End: 2024-06-12
Payer: COMMERCIAL

## 2024-06-12 ENCOUNTER — LAB (OUTPATIENT)
Dept: LAB | Facility: CLINIC | Age: 44
End: 2024-06-12
Payer: COMMERCIAL

## 2024-06-12 VITALS — SYSTOLIC BLOOD PRESSURE: 122 MMHG | DIASTOLIC BLOOD PRESSURE: 78 MMHG

## 2024-06-12 DIAGNOSIS — Z20.6 EXPOSURE TO HUMAN IMMUNODEFICIENCY VIRUS: ICD-10-CM

## 2024-06-12 DIAGNOSIS — Z09 SURGERY FOLLOW-UP EXAMINATION: Primary | ICD-10-CM

## 2024-06-12 LAB — HIV 1+2 AB+HIV1 P24 AG SERPL QL IA: NONREACTIVE

## 2024-06-12 PROCEDURE — 87389 HIV-1 AG W/HIV-1&-2 AB AG IA: CPT

## 2024-06-12 PROCEDURE — 36415 COLL VENOUS BLD VENIPUNCTURE: CPT

## 2024-06-12 PROCEDURE — 99024 POSTOP FOLLOW-UP VISIT: CPT | Performed by: PODIATRIST

## 2024-06-12 NOTE — LETTER
6/12/2024      Andrew Up  [address]  [facility] [address]      Dear Colleague,    Thank you for referring your patient, Andrew Up, to the [facility]. Please see a copy of my visit note below.    ASSESSMENT:  Encounter Diagnosis   Name Primary?     Surgery follow-up examination Yes     Media Information      Document Information    Other: Photograph      06/04/2024 9:54 AM   Attached To:   Hospital Encounter on 6/4/24   Source Information    Marty Isidro DPM  [facility]   Document History      MEDICAL DECISION MAKING:  The surgical site is stable on clinical exam.  It was redressed in a sterile fashion.  He is return to regular athletic shoes and I see no issues with the surgical site.  I recommend he continue to keep a clean or sterile dressing on the toe.  Follow-up in 1 week for suture removal.    I personally reviewed the preoperative and postoperative x-ray images with him.  I showed him the photograph of the osteophyte, seen above.    Disclaimer: This note consists of symbols derived from keyboarding, dictation and/or voice recognition software. As a result, there may be errors in the script that have gone undetected. Please consider this when interpreting information found in this chart.    Marty Isidro DPM, JOSEFINA, MS    [place] Department of Podiatry/Foot & Ankle Surgery      ____________________________________________________________________    HPI:       Jerzy is 1 week status post excision of bone spur or osteophyte right great toe.  This was done 6/4/2024.  Andrew presents in athletic shoe today.  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    PREOPERATIVE DIAGNOSIS:   1) osteophyte/bone spur medial right great toe     POSTOPERATIVE DIAGNOSIS: Same     PROCEDURE(S):  1) excision of osteophyte with bony remodeling of the medial aspect, right great toe  INDICATIONS FOR SURGERY:  Andrew Up is a 43-year-old  "male who saw me previously in clinic for ongoing and progressive pain related to a lump on the medial aspect of his right great toe.  The skin was inflamed with evidence of breakdown.  X-ray images showed an osteophyte in this region.  He inquired about surgical intervention, finding that accommodative footwear was not providing adequate protection of his skin and pain relief.  The procedure was discussed in detail.  This included the risks, the benefits, the postoperative cares, course and prognosis.  No guarantees were given.  For details regarding the most recent clinical exam and discussion please refer to the clinic note from 2/28/2024.   *  Past Medical History:   Diagnosis Date     Gastroesophageal reflux disease      Hyperlipidemia    *  *  Past Surgical History:   Procedure Laterality Date     EXCISE EXOSTOSIS FOOT Right 6/4/2024    Procedure: Excision of bone spur osteophyte right great toe;  Surgeon: Marty Isidro DPM;  Location: [facility] OR     HERNIA REPAIR       ORCHIECTOMY INGUINAL Left 2011     SEPTOPLASTY      2010 about     VARICOCELECTOMY Left 2009    ** repair   *  *  Current Outpatient Medications   Medication Sig Dispense Refill     acetaminophen (TYLENOL) 500 MG tablet Take 1-2 tablets (500-1,000 mg) by mouth every 8 hours as needed for mild pain 42 tablet 0     albuterol (PROAIR HFA/PROVENTIL HFA/VENTOLIN HFA) 108 (90 Base) MCG/ACT inhaler Inhale 2 puffs by mouth into the lungs every 6 hours. 8.5 g 2     atorvastatin (LIPITOR) 20 MG tablet Take 1 tablet by mouth once daily. 90 tablet 3     azithromycin (ZITHROMAX) 500 MG tablet Take 1 tablet by mouth daily for up to 3 days as needed for traveler's diarrhea. 3 tablet 0     BD DISP NEEDLES 22G X 1-1/2\" MISC USE ONCE WEEKLY FOR ADMINISTERING HORMONE INTRAMUSCULARLY 25 each 3     buPROPion (WELLBUTRIN XL) 150 MG 24 hr tablet Take 2 tablets (300 mg) by mouth every morning 60 tablet 11     clindamycin (CLINDAMAX) 1 % external gel Apply topically 2 " "times daily 60 g 0     diazepam (VALIUM) 5 MG tablet Take 1 tablet (5 mg) by mouth every 6 hours as needed (for flight anxiety) 5 tablet 0     emtricitabine-tenofovir (TRUVADA) 200-300 MG per tablet Take 1 tablet by mouth daily 90 tablet 0     EPINEPHrine (ANY BX GENERIC EQUIV) 0.3 MG/0.3ML injection 2-pack Inject 0.3 mg (0.3 ml) into the muscle once as needed for anaphylaxis. 2 mL 0     fluticasone (FLONASE) 50 MCG/ACT nasal spray Spray 1 spray into both nostrils daily       ibuprofen (ADVIL/MOTRIN) 600 MG tablet Take 1 tablet (600 mg) by mouth every 6 hours as needed for moderate pain 28 tablet 0     Krill Oil Omega-3 500 MG CAPS        lisinopril (ZESTRIL) 20 MG tablet Take 1 tablet (20 mg) by mouth daily 90 tablet 3     melatonin 5 MG tablet Take 1 tablet (5 mg) by mouth nightly as needed for sleep       multivitamin, therapeutic (THERA-VIT) TABS tablet Take 1 tablet by mouth daily       needle, disp, (BD HYPODERMIC NEEDLE) 18G X 1\" MISC USE TO DRAW UP HORMONES ONCE WEEKLY 100 each 2     Needle, Disp, 25G X 1-1/2\" MISC Use once weekly for administering hormone into the muscle. 100 each 2     omeprazole (PRILOSEC) 20 MG DR capsule Take 1 capsule by mouth daily. 90 capsule 2     Semaglutide-Weight Management (WEGOVY) 1 MG/0.5ML pen Inject 1 mg Subcutaneous once a week for 28 days 2 mL 0     syringe, disposable, (B-D SYRINGE LUER-RADHA) 1 ML MISC USE ONCE WEEKLY TO DRAW UP HORMONES 100 each 2     testosterone cypionate (DEPOTESTOSTERONE) 200 MG/ML injection Inject 0.25 mLs (50 mg) into the muscle once a week 4 mL 5         EXAM:    Vitals: /78   BMI: There is no height or weight on file to calculate BMI..     Derm: Incision is well coapted.  Sutures intact.  Minimal edema.  No erythema.        Again, thank you for allowing me to participate in the care of your patient.        Sincerely,        Marty Isidro, DPM  "

## 2024-06-19 ENCOUNTER — OFFICE VISIT (OUTPATIENT)
Dept: PODIATRY | Facility: CLINIC | Age: 44
End: 2024-06-19
Payer: COMMERCIAL

## 2024-06-19 VITALS — SYSTOLIC BLOOD PRESSURE: 125 MMHG | BODY MASS INDEX: 34.3 KG/M2 | WEIGHT: 260 LBS | DIASTOLIC BLOOD PRESSURE: 68 MMHG

## 2024-06-19 DIAGNOSIS — M25.70 OSTEOPHYTE, UNSPECIFIED JOINT: ICD-10-CM

## 2024-06-19 DIAGNOSIS — Z09 SURGERY FOLLOW-UP EXAMINATION: Primary | ICD-10-CM

## 2024-06-19 PROCEDURE — 99024 POSTOP FOLLOW-UP VISIT: CPT | Performed by: PODIATRIST

## 2024-06-19 NOTE — PROGRESS NOTES
ASSESSMENT:  Encounter Diagnoses   Name Primary?    Surgery follow-up examination Yes    Osteophyte/bone spur, right great toe      Media Information      Document Information    Other: Photograph      06/04/2024 9:54 AM   Attached To:   Hospital Encounter on 6/4/24   Source Information    Marty Isidro DPM   [facility]   Document History      MEDICAL DECISION MAKING:  Incision appears healed and ready for suture removal.  Incision prepped with alcohol and sutures removed without difficulty.  Steri-Strips and a light dressing applied.  Gradual return to regular weightbearing activities.  Follow-up on an as-needed basis.    Disclaimer: This note consists of symbols derived from keyboarding, dictation and/or voice recognition software. As a result, there may be errors in the script that have gone undetected. Please consider this when interpreting information found in this chart.    Marty Isidro DPM, JOSEFINA, MS [place] Department of Podiatry/Foot & Ankle Surgery      ____________________________________________________________________    HPI:       Andrew Up presents 2 weeks postoperatively.  He is status post bone spur or osteophyte excision, right great toe.    PREOPERATIVE DIAGNOSIS:   1) osteophyte/bone spur medial right great toe     POSTOPERATIVE DIAGNOSIS: Same     PROCEDURE(S):  1) excision of osteophyte with bony remodeling of the medial aspect, right great toe  INDICATIONS FOR SURGERY:  Andrew Up is a 43-year-old male who saw me previously in clinic for ongoing and progressive pain related to a lump on the medial aspect of his right great toe.  The skin was inflamed with evidence of breakdown.  X-ray images showed an osteophyte in this region.  He inquired about surgical intervention, finding that accommodative footwear was not providing adequate protection of his skin and pain relief.  The procedure was discussed in detail.  This included the risks, the benefits, the postoperative  cares, course and prognosis.  No guarantees were given.  For details regarding the most recent clinical exam and discussion please refer to the clinic note from 2/28/2024.     EXAM:    Vitals: /68   Wt 117.9 kg (260 lb)   BMI 34.30 kg/m    BMI: Body mass index is 34.3 kg/m .       Derm: Incision is well coapted.  Sutures intact.  Minimal edema.  No erythema.

## 2024-06-19 NOTE — LETTER
6/19/2024      Andrew Up  [address]  [facility] [address]      Dear Colleague,    Thank you for referring your patient, Andrew Up, to the [facility]. Please see a copy of my visit note below.    ASSESSMENT:  Encounter Diagnoses   Name Primary?     Surgery follow-up examination Yes     Osteophyte/bone spur, right great toe      Media Information      Document Information    Other: Photograph      06/04/2024 9:54 AM   Attached To:   Hospital Encounter on 6/4/24   Source Information    Marty Isidro DPM   [facility]   Document History      MEDICAL DECISION MAKING:  Incision appears healed and ready for suture removal.  Incision prepped with alcohol and sutures removed without difficulty.  Steri-Strips and a light dressing applied.  Gradual return to regular weightbearing activities.  Follow-up on an as-needed basis.    Disclaimer: This note consists of symbols derived from keyboarding, dictation and/or voice recognition software. As a result, there may be errors in the script that have gone undetected. Please consider this when interpreting information found in this chart.    Marty Isidro DPM, FACFAS, [facility] Department of Podiatry/Foot & Ankle Surgery      ____________________________________________________________________    HPI:       Andrew Up presents 2 weeks postoperatively.  He is status post bone spur or osteophyte excision, right great toe.    PREOPERATIVE DIAGNOSIS:   1) osteophyte/bone spur medial right great toe     POSTOPERATIVE DIAGNOSIS: Same     PROCEDURE(S):  1) excision of osteophyte with bony remodeling of the medial aspect, right great toe  INDICATIONS FOR SURGERY:  Andrew Up is a 43-year-old male who saw me previously in clinic for ongoing and progressive pain related to a lump on the medial aspect of his right great toe.  The skin was inflamed with evidence of breakdown.  X-ray images showed an osteophyte in this  region.  He inquired about surgical intervention, finding that accommodative footwear was not providing adequate protection of his skin and pain relief.  The procedure was discussed in detail.  This included the risks, the benefits, the postoperative cares, course and prognosis.  No guarantees were given.  For details regarding the most recent clinical exam and discussion please refer to the clinic note from 2/28/2024.     EXAM:    Vitals: /68   Wt 117.9 kg (260 lb)   BMI 34.30 kg/m    BMI: Body mass index is 34.3 kg/m .       Derm: Incision is well coapted.  Sutures intact.  Minimal edema.  No erythema.      Again, thank you for allowing me to participate in the care of your patient.        Sincerely,        Marty Isidro DPM

## 2024-06-26 DIAGNOSIS — E66.01 MORBID OBESITY (H): ICD-10-CM

## 2024-06-28 RX ORDER — SEMAGLUTIDE 1 MG/.5ML
INJECTION, SOLUTION SUBCUTANEOUS
Qty: 2 ML | Refills: 4 | Status: SHIPPED | OUTPATIENT
Start: 2024-06-28

## 2024-07-02 ENCOUNTER — MYC REFILL (OUTPATIENT)
Dept: FAMILY MEDICINE | Facility: CLINIC | Age: 44
End: 2024-07-02
Payer: COMMERCIAL

## 2024-07-02 DIAGNOSIS — E29.1 HYPOGONADISM MALE: ICD-10-CM

## 2024-07-03 RX ORDER — NEEDLES, DISPOSABLE 25GX5/8"
NEEDLE, DISPOSABLE MISCELLANEOUS
Qty: 100 EACH | Refills: 2 | Status: SHIPPED | OUTPATIENT
Start: 2024-07-03

## 2024-07-03 RX ORDER — SYRINGE, DISPOSABLE, 1 ML
SYRINGE, EMPTY DISPOSABLE MISCELLANEOUS
Qty: 100 EACH | Refills: 2 | Status: SHIPPED | OUTPATIENT
Start: 2024-07-03

## 2024-07-03 NOTE — TELEPHONE ENCOUNTER
"Request for medication refill:      needle, disp, (BD HYPODERMIC NEEDLE) 18G X 1\" MISC    Needle, Disp, 25G X 1-1/2\" MISC     syringe, disposable, (B-D SYRINGE LUER-RADHA) 1 ML MISC    Providers if patient needs an appointment and you are willing to give a one month supply please refill for one month and  send a letter/MyChart using \".SMILLIMITEDREFILL\" .smillimited and route chart to \"P [facility] \" (Giving one month refill in non controlled medications is strongly recommended before denial)    If refill has been denied, meaning absolutely no refills without visit, please complete the smart phrase \".smirxrefuse\" and route it to the \"P [facility] MED REFILLS\"  pool to inform the patient and the pharmacy.    Shyann Beltran MA      "

## 2024-07-10 ENCOUNTER — MYC REFILL (OUTPATIENT)
Dept: FAMILY MEDICINE | Facility: CLINIC | Age: 44
End: 2024-07-10
Payer: COMMERCIAL

## 2024-07-10 DIAGNOSIS — R06.09 EXERTIONAL DYSPNEA: ICD-10-CM

## 2024-07-10 RX ORDER — ALBUTEROL SULFATE 90 UG/1
2 AEROSOL, METERED RESPIRATORY (INHALATION) EVERY 4 HOURS PRN
Qty: 8.5 G | Refills: 11 | Status: SHIPPED | OUTPATIENT
Start: 2024-07-10

## 2024-07-10 NOTE — TELEPHONE ENCOUNTER
"Request for medication refill:    albuterol (PROAIR HFA/PROVENTIL HFA/VENTOLIN HFA) 108 (90 Base) MCG/ACT inhaler          Providers if patient needs an appointment and you are willing to give a one month supply please refill for one month and  send a letter/MyChart using \".SMILLIMITEDREFILL\" .smillimited and route chart to \"P [facility] \" (Giving one month refill in non controlled medications is strongly recommended before denial)    If refill has been denied, meaning absolutely no refills without visit, please complete the smart phrase \".smirxrefuse\" and route it to the \"P [facility] MED REFILLS\"  pool to inform the patient and the pharmacy.    Shyann Beltran MA      "

## 2024-08-21 DIAGNOSIS — F90.9 ATTENTION DEFICIT HYPERACTIVITY DISORDER (ADHD), UNSPECIFIED ADHD TYPE: ICD-10-CM

## 2024-08-21 RX ORDER — BUPROPION HYDROCHLORIDE 150 MG/1
300 TABLET ORAL EVERY MORNING
Qty: 60 TABLET | Refills: 11 | Status: SHIPPED | OUTPATIENT
Start: 2024-08-21

## 2024-08-21 NOTE — TELEPHONE ENCOUNTER
"Request for medication refill:    buPROPion (WELLBUTRIN XL) 150 MG 24 hr tablet     Providers if patient needs an appointment and you are willing to give a one month supply please refill for one month and  send a letter/MyChart using \".SMILLIMITEDREFILL\" .smillimited and route chart to \"P [facility] \" (Giving one month refill in non controlled medications is strongly recommended before denial)    If refill has been denied, meaning absolutely no refills without visit, please complete the smart phrase \".smirxrefuse\" and route it to the \"P [facility] MED REFILLS\"  pool to inform the patient and the pharmacy.    Ciara Hudson MA     "

## 2024-08-23 ENCOUNTER — OFFICE VISIT (OUTPATIENT)
Dept: FAMILY MEDICINE | Facility: CLINIC | Age: 44
End: 2024-08-23
Payer: COMMERCIAL

## 2024-08-23 VITALS
HEIGHT: 73 IN | TEMPERATURE: 97.8 F | SYSTOLIC BLOOD PRESSURE: 123 MMHG | BODY MASS INDEX: 34.3 KG/M2 | RESPIRATION RATE: 18 BRPM | DIASTOLIC BLOOD PRESSURE: 79 MMHG | OXYGEN SATURATION: 100 % | HEART RATE: 85 BPM

## 2024-08-23 DIAGNOSIS — Z51.81 ENCOUNTER FOR THERAPEUTIC DRUG MONITORING: ICD-10-CM

## 2024-08-23 DIAGNOSIS — D22.5 ATYPICAL NEVUS OF BACK: Primary | ICD-10-CM

## 2024-08-23 PROBLEM — M77.10 LATERAL EPICONDYLITIS, UNSPECIFIED LATERALITY: Status: RESOLVED | Noted: 2023-03-29 | Resolved: 2024-08-23

## 2024-08-23 LAB
ANION GAP SERPL CALCULATED.3IONS-SCNC: 12 MMOL/L (ref 7–15)
BUN SERPL-MCNC: 17.8 MG/DL (ref 6–20)
CALCIUM SERPL-MCNC: 9.5 MG/DL (ref 8.8–10.4)
CHLORIDE SERPL-SCNC: 102 MMOL/L (ref 98–107)
CREAT SERPL-MCNC: 1.07 MG/DL (ref 0.67–1.17)
EGFRCR SERPLBLD CKD-EPI 2021: 88 ML/MIN/1.73M2
GLUCOSE SERPL-MCNC: 97 MG/DL (ref 70–99)
HBA1C MFR BLD: 5.4 % (ref 0–5.6)
HCO3 SERPL-SCNC: 24 MMOL/L (ref 22–29)
POTASSIUM SERPL-SCNC: 4.3 MMOL/L (ref 3.4–5.3)
SODIUM SERPL-SCNC: 138 MMOL/L (ref 135–145)

## 2024-08-23 PROCEDURE — 80048 BASIC METABOLIC PNL TOTAL CA: CPT | Performed by: STUDENT IN AN ORGANIZED HEALTH CARE EDUCATION/TRAINING PROGRAM

## 2024-08-23 PROCEDURE — 36415 COLL VENOUS BLD VENIPUNCTURE: CPT | Performed by: STUDENT IN AN ORGANIZED HEALTH CARE EDUCATION/TRAINING PROGRAM

## 2024-08-23 PROCEDURE — 83036 HEMOGLOBIN GLYCOSYLATED A1C: CPT | Performed by: STUDENT IN AN ORGANIZED HEALTH CARE EDUCATION/TRAINING PROGRAM

## 2024-08-23 PROCEDURE — 99213 OFFICE O/P EST LOW 20 MIN: CPT | Performed by: STUDENT IN AN ORGANIZED HEALTH CARE EDUCATION/TRAINING PROGRAM

## 2024-08-23 NOTE — PROGRESS NOTES
"  Assessment & Plan     Atypical nevus of back  Serial monitoring of atypical nevus L scapular area within tattoo. Reticular pattern with asymmetric hyperpigmentation on dermoscopy. Given lack of growth or change in appearance over 6 mos, CTM. Low threshold for excision if any change, or could excise at any point if pt prefers.     Encounter for therapeutic drug monitoring  Going well on GLP1, may increase dose - will let me know   - Basic metabolic panel  - Hemoglobin A1c  - Basic metabolic panel  - Hemoglobin A1c      Nicotine/Tobacco Cessation  He reports that he has been smoking cigarettes. He started smoking about 15 years ago. He has a 6 pack-year smoking history. He has never used smokeless tobacco.  Nicotine/Tobacco Cessation Plan  Discussed cessation options, pt may consider hypnotherapy as he has tried all pharmacologic therapies      BMI  Estimated body mass index is 34.3 kg/m  as calculated from the following:    Height as of this encounter: 1.854 m (6' 1\").    Weight as of 6/19/24: 117.9 kg (260 lb).   Weight management plan: Discussed healthy diet and exercise guidelines          No follow-ups on file.    Pineda Morales is a 43 year old, presenting for the following health issues:  Follow Up      8/23/2024     8:10 AM   Additional Questions   Roomed by Malkain         8/23/2024    Information    services provided? No        HPI               Objective    /79 (BP Location: Left arm, Patient Position: Sitting, Cuff Size: Adult Large)   Pulse 85   Temp 97.8  F (36.6  C) (Oral)   Resp 18   Ht 1.854 m (6' 1\")   SpO2 100%   BMI 34.30 kg/m    Body mass index is 34.3 kg/m .  Physical Exam   GENERAL: alert, cooperative, in no acute distress  HEENT: sclera clear  PULM: normal respiratory effort   NEURO: alert and oriented, grossly intact, moves all extremities, normal gait   SKIN: atypical nevus L medial scapula, see below   PSYCH: euthymic affect      L medial scapula  R " forearm             Signed Electronically by: Mitra Payton DO

## 2024-08-24 ENCOUNTER — MYC REFILL (OUTPATIENT)
Dept: FAMILY MEDICINE | Facility: CLINIC | Age: 44
End: 2024-08-24
Payer: COMMERCIAL

## 2024-08-24 DIAGNOSIS — E29.1 HYPOGONADISM MALE: ICD-10-CM

## 2024-08-26 RX ORDER — TESTOSTERONE CYPIONATE 200 MG/ML
50 INJECTION, SOLUTION INTRAMUSCULAR WEEKLY
Qty: 4 ML | Refills: 5 | Status: SHIPPED | OUTPATIENT
Start: 2024-08-26

## 2024-08-26 NOTE — TELEPHONE ENCOUNTER
"Request for medication refill:    testosterone cypionate (DEPOTESTOSTERONE) 200 MG/ML injection     Providers if patient needs an appointment and you are willing to give a one month supply please refill for one month and  send a letter/MyChart using \".SMILLIMITEDREFILL\" .smillimited and route chart to \"P [facility] \" (Giving one month refill in non controlled medications is strongly recommended before denial)    If refill has been denied, meaning absolutely no refills without visit, please complete the smart phrase \".smirxrefuse\" and route it to the \"P [facility] MED REFILLS\"  pool to inform the patient and the pharmacy.    Shyann Beltran MA      "

## 2024-09-02 PROBLEM — D22.5 ATYPICAL NEVUS OF BACK: Status: ACTIVE | Noted: 2024-09-02

## 2024-09-08 ENCOUNTER — MYC REFILL (OUTPATIENT)
Dept: FAMILY MEDICINE | Facility: CLINIC | Age: 44
End: 2024-09-08
Payer: COMMERCIAL

## 2024-09-08 DIAGNOSIS — K20.90 ESOPHAGITIS: ICD-10-CM

## 2024-09-08 DIAGNOSIS — K44.9 HIATAL HERNIA: ICD-10-CM

## 2024-09-09 NOTE — TELEPHONE ENCOUNTER
"Request for medication refill:      omeprazole (PRILOSEC) 20 MG DR capsule       Providers if patient needs an appointment and you are willing to give a one month supply please refill for one month and  send a letter/MyChart using \".SMILLIMITEDREFILL\" .smillimited and route chart to \"[facility] \" (Giving one month refill in non controlled medications is strongly recommended before denial)    If refill has been denied, meaning absolutely no refills without visit, please complete the smart phrase \".smirxrefuse\" and route it to the \"[facility]\"  pool to inform the patient and the pharmacy.    Shyann Beltran MA      "

## 2024-11-27 ENCOUNTER — TELEPHONE (OUTPATIENT)
Dept: FAMILY MEDICINE | Facility: CLINIC | Age: 44
End: 2024-11-27
Payer: COMMERCIAL

## 2024-11-27 NOTE — TELEPHONE ENCOUNTER
Prior Authorization Retail Medication Request    Medication/Dose: Prior Auth -   zolpidem (AMBIEN) 10 MG tablet  Diagnosis and ICD code (if different than what is on RX):    New/renewal/insurance change PA/secondary ins. PA:  Previously Tried and Failed:    Rationale:      Insurance   Primary: [employer]   Insurance ID: [id number]      Secondary (if applicable):  Insurance ID:      Pharmacy Information (if different than what is on RX)  Name:    Phone:    Fax:    Clinic Information  Preferred routing pool for dept communication:

## 2024-12-02 NOTE — TELEPHONE ENCOUNTER
Retail Pharmacy Prior Authorization Team   Phone: [phone]    PA Initiation    Medication: ZOLPIDEM TARTRATE 10 MG PO TABS  Insurance Company: [employer] - Phone [phone] Fax [phone]  Pharmacy Filling the Rx: [id number] [place], [place] - [address]  Filling Pharmacy Phone: [phone]  Filling Pharmacy Fax:    Start Date: 12/2/2024

## 2024-12-02 NOTE — TELEPHONE ENCOUNTER
Prior Authorization Approval    Authorization Effective Date: 11/2/2024  Authorization Expiration Date: 12/2/2025  Medication: zolpidem (AMBIEN) 10 MG tablet-APPROVED  Reference #:     Insurance Company: [employer] - Phone [phone] Fax [phone]  Which Pharmacy is filling the prescription (Not needed for infusion/clinic administered): [facility] #[id number] - [place], [place] - [address]  Pharmacy Notified: Yes  Patient Notified: Instructed pharmacy to notify patient when script is ready to /ship.

## 2024-12-24 DIAGNOSIS — E78.2 MIXED HYPERLIPIDEMIA: ICD-10-CM

## 2024-12-24 RX ORDER — ATORVASTATIN CALCIUM 20 MG/1
20 TABLET, FILM COATED ORAL DAILY
Qty: 90 TABLET | Refills: 3 | Status: SHIPPED | OUTPATIENT
Start: 2024-12-24

## 2024-12-24 NOTE — TELEPHONE ENCOUNTER

## 2024-12-30 ENCOUNTER — MYC MEDICAL ADVICE (OUTPATIENT)
Dept: FAMILY MEDICINE | Facility: CLINIC | Age: 44
End: 2024-12-30
Payer: COMMERCIAL

## 2024-12-30 DIAGNOSIS — R79.89 LOW TESTOSTERONE: Primary | ICD-10-CM

## 2025-02-20 ENCOUNTER — MYC REFILL (OUTPATIENT)
Dept: FAMILY MEDICINE | Facility: CLINIC | Age: 45
End: 2025-02-20
Payer: COMMERCIAL

## 2025-02-20 DIAGNOSIS — Z20.6 EXPOSURE TO HUMAN IMMUNODEFICIENCY VIRUS: ICD-10-CM

## 2025-02-20 RX ORDER — EMTRICITABINE AND TENOFOVIR DISOPROXIL FUMARATE 200; 300 MG/1; MG/1
1 TABLET, FILM COATED ORAL DAILY
Qty: 90 TABLET | Refills: 0 | Status: SHIPPED | OUTPATIENT
Start: 2025-02-20

## 2025-02-20 NOTE — TELEPHONE ENCOUNTER
"Last seen 8/23/24, scheduled 3/11/25    Request for medication refill:    Medication Name: emtricitabine-tenofovir (TRUVADA) 200-300 MG per tablet     Providers if patient needs an appointment and you are willing to give a one month supply please refill for one month and  send a MyChart using \".SMILLIMITEDREFILL\" .Or route chart to \"P [facility] \" . To call patient and inform of limited refill and providers request to make an appointment. (Giving one month refill in non controlled medications is strongly recommended before denial)    If refill has been denied, meaning absolutely no refills without visit, please complete the smart phrase \".SMIRXREFUSE\" and route it to the \"P [facility] MED REFILLS\"  pool to inform the patient and the pharmacy.    Denia Miller RN      "

## 2025-03-03 ENCOUNTER — MYC REFILL (OUTPATIENT)
Dept: FAMILY MEDICINE | Facility: CLINIC | Age: 45
End: 2025-03-03
Payer: COMMERCIAL

## 2025-03-03 DIAGNOSIS — E29.1 HYPOGONADISM MALE: ICD-10-CM

## 2025-03-04 RX ORDER — TESTOSTERONE CYPIONATE 200 MG/ML
50 INJECTION, SOLUTION INTRAMUSCULAR WEEKLY
Qty: 4 ML | Refills: 5 | Status: SHIPPED | OUTPATIENT
Start: 2025-03-04

## 2025-03-04 NOTE — TELEPHONE ENCOUNTER
"Request for medication refill:    Medication Name: testosterone cypionate (DEPOTESTOSTERONE) 200 MG/ML injection     Providers if patient needs an appointment and you are willing to give a one month supply please refill for one month and  send a MyChart using \".SMILLIMITEDREFILL\" .Or route chart to \"P [facility] \" . To call patient and inform of limited refill and providers request to make an appointment. (Giving one month refill in non controlled medications is strongly recommended before denial)    If refill has been denied, meaning absolutely no refills without visit, please complete the smart phrase \".SMIRXREFUSE\" and route it to the \"P [facility] MED REFILLS\"  pool to inform the patient and the pharmacy.    Tramaine Ferro, [facility]      "
18
20

## 2025-03-06 ENCOUNTER — LAB (OUTPATIENT)
Dept: LAB | Facility: CLINIC | Age: 45
End: 2025-03-06
Payer: COMMERCIAL

## 2025-03-06 ENCOUNTER — MYC MEDICAL ADVICE (OUTPATIENT)
Dept: FAMILY MEDICINE | Facility: CLINIC | Age: 45
End: 2025-03-06

## 2025-03-06 DIAGNOSIS — Z20.6 EXPOSURE TO HUMAN IMMUNODEFICIENCY VIRUS: ICD-10-CM

## 2025-03-06 DIAGNOSIS — Z20.6 EXPOSURE TO HUMAN IMMUNODEFICIENCY VIRUS: Primary | ICD-10-CM

## 2025-03-06 DIAGNOSIS — R79.89 LOW TESTOSTERONE: ICD-10-CM

## 2025-03-06 LAB
HGB BLD-MCNC: 16.6 G/DL (ref 13.3–17.7)
HIV 1+2 AB+HIV1 P24 AG SERPL QL IA: NONREACTIVE

## 2025-03-09 LAB — TESTOST SERPL-MCNC: 645 NG/DL (ref 240–950)

## 2025-03-09 SDOH — HEALTH STABILITY: PHYSICAL HEALTH: ON AVERAGE, HOW MANY DAYS PER WEEK DO YOU ENGAGE IN MODERATE TO STRENUOUS EXERCISE (LIKE A BRISK WALK)?: 4 DAYS

## 2025-03-09 SDOH — HEALTH STABILITY: PHYSICAL HEALTH: ON AVERAGE, HOW MANY MINUTES DO YOU ENGAGE IN EXERCISE AT THIS LEVEL?: 60 MIN

## 2025-03-09 ASSESSMENT — SOCIAL DETERMINANTS OF HEALTH (SDOH): HOW OFTEN DO YOU GET TOGETHER WITH FRIENDS OR RELATIVES?: TWICE A WEEK

## 2025-03-11 ENCOUNTER — OFFICE VISIT (OUTPATIENT)
Dept: FAMILY MEDICINE | Facility: CLINIC | Age: 45
End: 2025-03-11
Payer: COMMERCIAL

## 2025-03-11 VITALS
TEMPERATURE: 98.4 F | BODY MASS INDEX: 34.4 KG/M2 | RESPIRATION RATE: 14 BRPM | DIASTOLIC BLOOD PRESSURE: 80 MMHG | HEART RATE: 74 BPM | SYSTOLIC BLOOD PRESSURE: 126 MMHG | HEIGHT: 73 IN | OXYGEN SATURATION: 95 % | WEIGHT: 259.6 LBS

## 2025-03-11 DIAGNOSIS — Z02.89 ENCOUNTER FOR COMPLETION OF FORM WITH PATIENT: ICD-10-CM

## 2025-03-11 DIAGNOSIS — Z00.00 ROUTINE GENERAL MEDICAL EXAMINATION AT A HEALTH CARE FACILITY: ICD-10-CM

## 2025-03-11 DIAGNOSIS — E34.9 TESTOSTERONE DEFICIENCY: ICD-10-CM

## 2025-03-11 DIAGNOSIS — I10 BENIGN ESSENTIAL HYPERTENSION: Primary | ICD-10-CM

## 2025-03-11 DIAGNOSIS — F17.200 TOBACCO USE DISORDER: ICD-10-CM

## 2025-03-11 DIAGNOSIS — G47.26 SHIFT WORK SLEEP DISORDER: ICD-10-CM

## 2025-03-11 DIAGNOSIS — D22.5 ATYPICAL NEVUS OF BACK: ICD-10-CM

## 2025-03-11 DIAGNOSIS — E78.2 MIXED HYPERLIPIDEMIA: ICD-10-CM

## 2025-03-11 NOTE — PROGRESS NOTES
"Preventive Care Visit  [facility] KAYLA Payton DO, Family Medicine  Mar 11, 2025      Assessment & Plan     Routine general medical examination at a health care facility  Affirmed healthy habits, discussed health goals where applicable. Reviewed preventive and screening guidelines. Up to date on all screenings. Will message me near 45th birthday to order colonoscopy.     Benign essential hypertension  Controlled. BMP UTD.   - lisinopril (ZESTRIL) 20 MG tablet; Take 1 tablet (20 mg) by mouth daily.    Atypical nevus of back  Subtle changes since last visit - see media tab. Pt prefers removal, does not care about cosmetic outcome of tattoo, will refer to procedure clinic. Anticipate elliptical excision.   - Procedure Clinic - [facility]; Future    Mixed hyperlipidemia  On atorvastatin. Lipids UTD    Testosterone deficiency  Proven on labs. Surgical hx removal 1 testicle. Mid cycle T levels and Hgb utd and wnl.   - continue testosterone 50mg IM weekly      Shift work sleep disorder  Infrequent zolpidem use. Aware of r/b/a. Minimizes use as able.   - zolpidem (AMBIEN) 10 MG tablet; Take 0.5-1 tablets (5-10 mg) by mouth nightly as needed for sleep.    Encounter for completion of form with patient  Pursuing fostering to adopt! Forms completed and will fax.     Tobacco use disorder  Minimal - goes thorugh about a pack a week. Aware of resources if needed.     On PrEP  HIV+ partner w/ undetectable viral load. Monogamous. Have shared decision to monitor HIV q6mos instead of q3mos per pt preference and low risk of transmission       BMI  Estimated body mass index is 34.25 kg/m  as calculated from the following:    Height as of this encounter: 1.854 m (6' 1\").    Weight as of this encounter: 117.8 kg (259 lb 9.6 oz).   Weight management plan: Discussed healthy diet and exercise guidelines    Counseling  Appropriate preventive services were addressed with this patient via screening, " questionnaire, or discussion as appropriate for fall prevention, nutrition, physical activity, Tobacco-use cessation, social engagement, weight loss and cognition.  Checklist reviewing preventive services available has been given to the patient.  Reviewed patient's diet, addressing concerns and/or questions.   He is at risk for psychosocial distress and has been provided with information to reduce risk.         Return in about 6 months (around 9/11/2025) for Follow up for PrEP labs .    Pineda Morales is a 44 year old, presenting for the following:  Physical        3/11/2025     3:11 PM   Additional Questions   Roomed by Sonny         3/11/2025    Information    services provided? No          HPI  Sonny is a 44 year old male who is here for hs preventative visit today. No current medical concerns. He is her for his yearly physical and spot check for a mole on his back that he has been seen for in prior visits to the clinic. No recent infections. Patient states he is still smoking but he is down to 1 pack/week (previously used to smoke 1 pack/week).         3/9/2025   General Health   How would you rate your overall physical health? Good   Feel stress (tense, anxious, or unable to sleep) To some extent   (!) STRESS CONCERN      3/9/2025   Nutrition   Three or more servings of calcium each day? Yes   Diet: Regular (no restrictions)   How many servings of fruit and vegetables per day? 4 or more   How many sweetened beverages each day? 0-1         3/9/2025   Exercise   Days per week of moderate/strenous exercise 4 days   Average minutes spent exercising at this level 60 min         3/9/2025   Social Factors   Frequency of gathering with friends or relatives Twice a week   Worry food won't last until get money to buy more No   Food not last or not have enough money for food? No   Do you have housing? (Housing is defined as stable permanent housing and does not include staying ouside in a car, in a  tent, in an abandoned building, in an overnight shelter, or couch-surfing.) Yes   Are you worried about losing your housing? No   Lack of transportation? No   Unable to get utilities (heat,electricity)? No         3/9/2025   Dental   Dentist two times every year? Yes           Today's PHQ-2 Score:       3/11/2025     7:44 AM   PHQ-2 ( 1999 [employer])   Q1: Little interest or pleasure in doing things 0   Q2: Feeling down, depressed or hopeless 1   PHQ-2 Score 1    Q1: Little interest or pleasure in doing things Not at all   Q2: Feeling down, depressed or hopeless Several days   PHQ-2 Score 1       Patient-reported           3/9/2025   Substance Use   Alcohol more than 3/day or more than 7/wk No   Do you use any other substances recreationally? No     Social History     Tobacco Use    Smoking status: Every Day     Current packs/day: 1.00 pack/week     Average packs/day: 0.5 packs/day for 12.0 years (6.0 ttl pk-yrs)     Types: Cigarettes     Start date: 7/1/2009     Last attempt to quit: 7/1/2021     Years since quitting: 3.6    Smokeless tobacco: Never    Tobacco comments:     using Wellbutrin   Substance Use Topics    Alcohol use: Yes     Alcohol/week: 0.0 standard drinks of alcohol     Comment: 3-4 times per month, 1-2 drinks    Drug use: No           3/9/2025   STI Screening   New sexual partner(s) since last STI/HIV test? No   ASCVD Risk   The 10-year ASCVD risk score (Lyndsey GILBERT, et al., 2019) is: 6.5%    Values used to calculate the score:      Age: 44 years      Sex: Male      Is Non- : No      Diabetic: No      Tobacco smoker: Yes      Systolic Blood Pressure: 126 mmHg      Is BP treated: Yes      HDL Cholesterol: 32 mg/dL      Total Cholesterol: 155 mg/dL        3/9/2025   Contraception/Family Planning   Questions about contraception or family planning No        Reviewed and updated as needed this visit by Provider   Tobacco  Allergies  Meds  Problems  Med Hx  Surg Hx  Fam  "Hx             Objective    Exam  /80   Pulse 74   Temp 98.4  F (36.9  C) (Temporal)   Resp 14   Ht 1.854 m (6' 1\")   Wt 117.8 kg (259 lb 9.6 oz)   SpO2 95%   BMI 34.25 kg/m     Estimated body mass index is 34.25 kg/m  as calculated from the following:    Height as of this encounter: 1.854 m (6' 1\").    Weight as of this encounter: 117.8 kg (259 lb 9.6 oz).    Physical Exam  GENERAL: alert and no distress  RESP: lungs clear to auscultation - no rales, rhonchi or wheezes  CV: regular rate and rhythm, normal S1 S2, no S3 or S4, no murmur, click or rub, no peripheral edema  MS: no gross musculoskeletal defects noted, no edema  SKIN: irregularly shaped melanocytic nevus within tattoo on L mid scapular back -- see media tab for gross image and Dermoscopy images. Several other scattered nevi on back and arms, benign appearing         Signed Electronically by: Mitra Payton DO    "

## 2025-03-12 RX ORDER — ZOLPIDEM TARTRATE 10 MG/1
5-10 TABLET ORAL
Qty: 30 TABLET | Refills: 1 | Status: SHIPPED | OUTPATIENT
Start: 2025-03-12

## 2025-03-12 RX ORDER — LISINOPRIL 20 MG/1
20 TABLET ORAL DAILY
Qty: 90 TABLET | Refills: 3 | Status: SHIPPED | OUTPATIENT
Start: 2025-03-12

## 2025-03-12 NOTE — PATIENT INSTRUCTIONS
Patient Education   Preventive Care Advice   This is general advice given by our system to help you stay healthy. However, your care team may have specific advice just for you. Please talk to your care team about your preventive care needs.  Nutrition  Eat 5 or more servings of fruits and vegetables each day.  Try wheat bread, brown rice and whole grain pasta (instead of white bread, rice, and pasta).  Get enough calcium and vitamin D. Check the label on foods and aim for 100% of the RDA (recommended daily allowance).  Lifestyle  Exercise at least 150 minutes each week  (30 minutes a day, 5 days a week).  Do muscle strengthening activities 2 days a week. These help control your weight and prevent disease.  No smoking.  Wear sunscreen to prevent skin cancer.  Have a dental exam and cleaning every 6 months.  Yearly exams  See your health care team every year to talk about:  Any changes in your health.  Any medicines your care team has prescribed.  Preventive care, family planning, and ways to prevent chronic diseases.  Shots (vaccines)   HPV shots (up to age 26), if you've never had them before.  Hepatitis B shots (up to age 59), if you've never had them before.  COVID-19 shot: Get this shot when it's due.  Flu shot: Get a flu shot every year.  Tetanus shot: Get a tetanus shot every 10 years.  Pneumococcal, hepatitis A, and RSV shots: Ask your care team if you need these based on your risk.  Shingles shot (for age 50 and up)  General health tests  Diabetes screening:  Starting at age 35, Get screened for diabetes at least every 3 years.  If you are younger than age 35, ask your care team if you should be screened for diabetes.  Cholesterol test: At age 39, start having a cholesterol test every 5 years, or more often if advised.  Bone density scan (DEXA): At age 50, ask your care team if you should have this scan for osteoporosis (brittle bones).  Hepatitis C: Get tested at least once in your life.  STIs (sexually  transmitted infections)  Before age 24: Ask your care team if you should be screened for STIs.  After age 24: Get screened for STIs if you're at risk. You are at risk for STIs (including HIV) if:  You are sexually active with more than one person.  You don't use condoms every time.  You or a partner was diagnosed with a sexually transmitted infection.  If you are at risk for HIV, ask about PrEP medicine to prevent HIV.  Get tested for HIV at least once in your life, whether you are at risk for HIV or not.  Cancer screening tests  Cervical cancer screening: If you have a cervix, begin getting regular cervical cancer screening tests starting at age 21.  Breast cancer scan (mammogram): If you've ever had breasts, begin having regular mammograms starting at age 40. This is a scan to check for breast cancer.  Colon cancer screening: It is important to start screening for colon cancer at age 45.  Have a colonoscopy test every 10 years (or more often if you're at risk) Or, ask your provider about stool tests like a FIT test every year or Cologuard test every 3 years.  To learn more about your testing options, visit:   .  For help making a decision, visit:   https://bit.ly/gc97058.  Prostate cancer screening test: If you have a prostate, ask your care team if a prostate cancer screening test (PSA) at age 55 is right for you.  Lung cancer screening: If you are a current or former smoker ages 50 to 80, ask your care team if ongoing lung cancer screenings are right for you.  For informational purposes only. Not to replace the advice of your health care provider. Copyright   2023 [facility]. All rights reserved. Clinically reviewed by the [facility]. [employer] 269332 - REV 01/24.  Learning About Stress  What is stress?     Stress is your body's response to a hard situation. Your body can have a physical, emotional, or mental response. Stress is a fact of life for most people, and it  affects everyone differently. What causes stress for you may not be stressful for someone else.  A lot of things can cause stress. You may feel stress when you go on a job interview, take a test, or run a race. This kind of short-term stress is normal and even useful. It can help you if you need to work hard or react quickly. For example, stress can help you finish an important job on time.  Long-term stress is caused by ongoing stressful situations or events. Examples of long-term stress include long-term health problems, ongoing problems at work, or conflicts in your family. Long-term stress can harm your health.  How does stress affect your health?  When you are stressed, your body responds as though you are in danger. It makes hormones that speed up your heart, make you breathe faster, and give you a burst of energy. This is called the fight-or-flight stress response. If the stress is over quickly, your body goes back to normal and no harm is done.  But if stress happens too often or lasts too long, it can have bad effects. Long-term stress can make you more likely to get sick, and it can make symptoms of some diseases worse. If you tense up when you are stressed, you may develop neck, shoulder, or low back pain. Stress is linked to high blood pressure and heart disease.  Stress also harms your emotional health. It can make you smith, tense, or depressed. Your relationships may suffer, and you may not do well at work or school.  What can you do to manage stress?  You can try these things to help manage stress:   Do something active. Exercise or activity can help reduce stress. Walking is a great way to get started. Even everyday activities such as housecleaning or yard work can help.  Try yoga or woody chi. These techniques combine exercise and meditation. You may need some training at first to learn them.  Do something you enjoy. For example, listen to music or go to a movie. Practice your hobby or do volunteer  "work.  Meditate. This can help you relax, because you are not worrying about what happened before or what may happen in the future.  Do guided imagery. Imagine yourself in any setting that helps you feel calm. You can use online videos, books, or a teacher to guide you.  Do breathing exercises. For example:  From a standing position, bend forward from the waist with your knees slightly bent. Let your arms dangle close to the floor.  Breathe in slowly and deeply as you return to a standing position. Roll up slowly and lift your head last.  Hold your breath for just a few seconds in the standing position.  Breathe out slowly and bend forward from the waist.  Let your feelings out. Talk, laugh, cry, and express anger when you need to. Talking with supportive friends or family, a counselor, or a zack leader about your feelings is a healthy way to relieve stress. Avoid discussing your feelings with people who make you feel worse.  Write. It may help to write about things that are bothering you. This helps you find out how much stress you feel and what is causing it. When you know this, you can find better ways to cope.  What can you do to prevent stress?  You might try some of these things to help prevent stress:  Manage your time. This helps you find time to do the things you want and need to do.  Get enough sleep. Your body recovers from the stresses of the day while you are sleeping.  Get support. Your family, friends, and community can make a difference in how you experience stress.  Limit your news feed. Avoid or limit time on social media or news that may make you feel stressed.  Do something active. Exercise or activity can help reduce stress. Walking is a great way to get started.  Where can you learn more?  Go to https://www.[employer].net/patiented  Enter N032 in the search box to learn more about \"Learning About Stress.\"  Current as of: October 24, 2023  Content Version: 14.3    2024 [employer]. "   Care instructions adapted under license by your healthcare professional. If you have questions about a medical condition or this instruction, always ask your healthcare professional. [employer] disclaims any warranty or liability for your use of this information.

## 2025-03-18 ENCOUNTER — DOCUMENTATION ONLY (OUTPATIENT)
Dept: FAMILY MEDICINE | Facility: CLINIC | Age: 45
End: 2025-03-18
Payer: COMMERCIAL

## 2025-03-18 NOTE — PROGRESS NOTES
Form has been completed by provider.     Form sent out via: Fax to [facility] and S at Fax Number: [phone]  Patient informed: No, Reason:  Output date: March 18, 2025    Mely Lowery CMA      **Please close the encounter**

## 2025-04-03 ENCOUNTER — OFFICE VISIT (OUTPATIENT)
Dept: FAMILY MEDICINE | Facility: CLINIC | Age: 45
End: 2025-04-03
Payer: COMMERCIAL

## 2025-04-03 VITALS
HEIGHT: 73 IN | SYSTOLIC BLOOD PRESSURE: 127 MMHG | OXYGEN SATURATION: 95 % | WEIGHT: 262.3 LBS | RESPIRATION RATE: 14 BRPM | BODY MASS INDEX: 34.76 KG/M2 | DIASTOLIC BLOOD PRESSURE: 76 MMHG | TEMPERATURE: 97.7 F | HEART RATE: 85 BPM

## 2025-04-03 DIAGNOSIS — D22.5 ATYPICAL NEVUS OF BACK: Primary | ICD-10-CM

## 2025-04-03 DIAGNOSIS — L91.8 SKIN TAG: ICD-10-CM

## 2025-04-03 NOTE — PATIENT INSTRUCTIONS
Wound Care Instructions    1.  Keep area dry today.    2.  Starting tomorrow wash gently with soap and water once daily.      3.  Apply Vaseline or antibiotic ointment to the area and cover with a bandage if desired.  Do not let it dry out and form a scab as this will make the resulting scar more noticeable.    4. Protect the area from sun for up to one year afterward as the scar is continuing to remodel.  Sun exposure will also make the resulting scar more noticeable.    5.  Call if the area is very red, tender, has a discharge or is very itchy while healing, or if you have any other questions.  These may be signs of early infection or allergy.  
never used

## 2025-04-03 NOTE — PROGRESS NOTES
SUBJECTIVE:   Andrew Up is a 44 year old male who presents today for skin tag excision. He has 1 skin tag that he would like removed.  They have become irritated by rubbing from clothing. He also as an atypical melanocytic nevus with irregular borders on his left shoulder blade. This lesion has been changing and growing.      OBJECTIVE:   Exam shows a 1 skin tag, on the left hip. There is evidence of irritation with surrounding redness. It is 0.3cm in diameter. There was also one atypical melanocytic nevus on the left shoulder blade with irregular borders.    Discussed with Andrew regarding technique, risk of infection, and scarring. Alcohol swab is used for cleansing. Lidocaine with epinephrine is used for anesthesia.Choraprep was then used for disinfection. Nevus on left shoulder blade was removed with derma blade via shave biopsy and placed in formalin for pathological evaluation. Skin tag removed with scissors. Aluminum chloride and pressure were used for hemostasis. Antibiotic ointment placed, and bandage applied. Andrew tolerated procedure well. No complications.    ASSESSMENT:   1 skin tag excision.  1 atypical nevus on back removed via shave biopsy.    PLAN:   (D22.5) Atypical nevus of back  Comment: The nevus specimen submitted to pathology. The left hip skin tag was disposed. Wound care instructions given. Follow up as needed.  Plan: Dermatological Path Order and Indications,         Removal of up to 15 skin tags, trunk, arms,         legs, 6-10MM TRUNK,ARM,LEG

## 2025-04-03 NOTE — PROGRESS NOTES
Preceptor Attestation:    I discussed the patient with the resident and evaluated the patient in person. I was present for and supervised the entire procedure. I have verified the content of the note, which accurately reflects my assessment of the patient and the plan of care.   Supervising Physician:  Chau Christianson MD.

## 2025-04-08 LAB
PATH REPORT.COMMENTS IMP SPEC: NORMAL
PATH REPORT.FINAL DX SPEC: NORMAL
PATH REPORT.GROSS SPEC: NORMAL
PATH REPORT.MICROSCOPIC SPEC OTHER STN: NORMAL
PATH REPORT.RELEVANT HX SPEC: NORMAL

## 2025-04-09 DIAGNOSIS — D23.9 DYSPLASTIC NEVUS: Primary | ICD-10-CM

## 2025-04-09 NOTE — PROGRESS NOTES
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 This is a borderline lesion with features that verge on melanoma in situ (MIS), though diffusely negative PRAME immunostaining argues against an outright MIS diagnosis. The lesion appears narrowly excised but is close to the lateral margin. Re-excision is recommended to ensure complete removal.   - Priority dermatology referral placed

## 2025-04-10 ENCOUNTER — TELEPHONE (OUTPATIENT)
Dept: DERMATOLOGY | Facility: CLINIC | Age: 45
End: 2025-04-10
Payer: COMMERCIAL

## 2025-04-10 NOTE — TELEPHONE ENCOUNTER
Called and spoke with pt and scheduled sooner appt at     Alaina DOYLE RN  Dermatology   [phone]

## 2025-04-10 NOTE — TELEPHONE ENCOUNTER
Scheduled procedure for 7/9/25 at the [facility] in [place]. Patient prefers to be seen at the [place] location if there is an earlier availability than 7/9/25. Pt informed that the clinic will reach out if this is possible.    Scottie Lobo, Visit Facilitator

## 2025-04-11 ENCOUNTER — TELEPHONE (OUTPATIENT)
Dept: DERMATOLOGY | Facility: CLINIC | Age: 45
End: 2025-04-11
Payer: COMMERCIAL

## 2025-04-11 NOTE — TELEPHONE ENCOUNTER
[facility] Call Center    Phone Message    May a detailed message be left on voicemail: yes     Reason for Call: Appointment Intake    Referring Provider Name: Dr. Fernandez  Diagnosis and/or Symptoms: Pt would like to reschedule his MOHS from 4/24/25. Please call back to reschedule. Thank you.     Action Taken: Other: OX Derm    Travel Screening: Not Applicable     Date of Service:

## 2025-04-14 NOTE — TELEPHONE ENCOUNTER
Patient Contact    Attempt # 1    Was call answered?  No    Left message on answering machine for patient to call back.    Alaina DOYLE RN  Dermatology   [phone]

## 2025-05-06 NOTE — PROGRESS NOTES
Surgical Office Location:  [facility]  [address] [place] [id number]

## 2025-05-08 ENCOUNTER — OFFICE VISIT (OUTPATIENT)
Dept: DERMATOLOGY | Facility: CLINIC | Age: 45
End: 2025-05-08
Payer: COMMERCIAL

## 2025-05-08 DIAGNOSIS — D23.9 DERMAL NEVUS: ICD-10-CM

## 2025-05-08 DIAGNOSIS — D22.9 MULTIPLE BENIGN NEVI: Primary | ICD-10-CM

## 2025-05-08 DIAGNOSIS — D18.01 ANGIOMA OF SKIN: ICD-10-CM

## 2025-05-08 DIAGNOSIS — L81.4 LENTIGO: ICD-10-CM

## 2025-05-08 DIAGNOSIS — D23.9 DYSPLASTIC NEVUS: ICD-10-CM

## 2025-05-08 NOTE — PROGRESS NOTES
Andrew Up , a 44 year old year old male patient, I was asked to see by Dr. Christianson for atypical nevus on l shoulder blade.  Patient has no other skin complaints today.  Remainder of the HPI, Meds, PMH, Allergies, FH, and SH was reviewed in chart.      Past Medical History:   Diagnosis Date    Gastroesophageal reflux disease     Hyperlipidemia        Past Surgical History:   Procedure Laterality Date    EXCISE EXOSTOSIS FOOT Right 6/4/2024    Procedure: Excision of bone spur osteophyte right great toe;  Surgeon: Marty Isidro DPM;  Location: [facility] OR    HERNIA REPAIR      ORCHIECTOMY INGUINAL Left 2011    SEPTOPLASTY      2010 about    VARICOCELECTOMY Left 2009    ** repair        Family History   Problem Relation Age of Onset    Hyperlipidemia Mother     Depression Mother     Sleep Apnea Mother     Hyperlipidemia Father     Anxiety Disorder Father     Substance Abuse Father     Hypertension Maternal Grandmother     Anxiety Disorder Maternal Grandmother     Hypertension Maternal Grandfather     Diabetes Maternal Grandfather     Substance Abuse Paternal Grandfather     Cerebrovascular Disease Paternal Grandfather        Social History     Socioeconomic History    Marital status:      Spouse name: Not on file    Number of children: Not on file    Years of education: Not on file    Highest education level: Not on file   Occupational History    Not on file   Tobacco Use    Smoking status: Every Day     Current packs/day: 0.00     Average packs/day: 0.5 packs/day for 12.0 years (6.0 ttl pk-yrs)     Types: Cigarettes     Start date: 7/1/2009     Last attempt to quit: 7/1/2021     Years since quitting: 3.8    Smokeless tobacco: Never    Tobacco comments:     using Wellbutrin   Substance and Sexual Activity    Alcohol use: Yes     Alcohol/week: 0.0 standard drinks of alcohol     Comment: 3-4 times per month, 1-2 drinks    Drug use: No    Sexual activity: Yes   Other Topics Concern    Parent/sibling w/ CABG, MI  or angioplasty before 65F 55M? Not Asked   Social History Narrative    Not on file     Social Drivers of Health     Financial Resource Strain: Low Risk  (3/9/2025)    Financial Resource Strain     Within the past 12 months, have you or your family members you live with been unable to get utilities (heat, electricity) when it was really needed?: No   Food Insecurity: Low Risk  (3/9/2025)    Food Insecurity     Within the past 12 months, did you worry that your food would run out before you got money to buy more?: No     Within the past 12 months, did the food you bought just not last and you didn t have money to get more?: No   Transportation Needs: Low Risk  (3/9/2025)    Transportation Needs     Within the past 12 months, has lack of transportation kept you from medical appointments, getting your medicines, non-medical meetings or appointments, work, or from getting things that you need?: No   Physical Activity: Sufficiently Active (3/9/2025)    Exercise Vital Sign     Days of Exercise per Week: 4 days     Minutes of Exercise per Session: 60 min   Stress: Stress Concern Present (3/9/2025)    [employer] [employer] - Occupational Stress Questionnaire     Feeling of Stress : To some extent   Social Connections: Unknown (3/9/2025)    Social Connection and Isolation Panel [NHANES]     Frequency of Communication with Friends and Family: Not on file     Frequency of Social Gatherings with Friends and Family: Twice a week     Attends Buddhism Services: Not on file     Active Member of Clubs or Organizations: Not on file     Attends Club or Organization Meetings: Not on file     Marital Status: Not on file   Interpersonal Safety: Low Risk  (3/11/2025)    Interpersonal Safety     Do you feel physically and emotionally safe where you currently live?: Yes     Within the past 12 months, have you been hit, slapped, kicked or otherwise physically hurt by someone?: No     Within the past 12 months, have you  "been humiliated or emotionally abused in other ways by your partner or ex-partner?: No   Housing Stability: Low Risk  (3/9/2025)    Housing Stability     Do you have housing? : Yes     Are you worried about losing your housing?: No       Outpatient Encounter Medications as of 5/8/2025   Medication Sig Dispense Refill    albuterol (PROAIR HFA/PROVENTIL HFA/VENTOLIN HFA) 108 (90 Base) MCG/ACT inhaler Inhale 2 puffs into the lungs every 4 hours as needed for shortness of breath, wheezing or cough 8.5 g 11    atorvastatin (LIPITOR) 20 MG tablet TAKE 1 TABLET BY MOUTH EVERY DAY 90 tablet 3    emtricitabine-tenofovir (TRUVADA) 200-300 MG per tablet Take 1 tablet by mouth daily. 90 tablet 0    EPINEPHrine (ANY BX GENERIC EQUIV) 0.3 MG/0.3ML injection 2-pack Inject 0.3 mg (0.3 ml) into the muscle once as needed for anaphylaxis. 2 mL 0    fluticasone (FLONASE) 50 MCG/ACT nasal spray Spray 1 spray into both nostrils daily      Krill Oil Omega-3 500 MG CAPS       lisinopril (ZESTRIL) 20 MG tablet Take 1 tablet (20 mg) by mouth daily. 90 tablet 3    melatonin 5 MG tablet Take 1 tablet (5 mg) by mouth nightly as needed for sleep      multivitamin, therapeutic (THERA-VIT) TABS tablet Take 1 tablet by mouth daily      needle, disp, (BD HYPODERMIC NEEDLE) 18G X 1\" MISC USE TO DRAW UP HORMONES ONCE WEEKLY 100 each 2    Needle, Disp, 25G X 1-1/2\" MISC Use once weekly for administering hormone into the muscle. 100 each 2    omeprazole (PRILOSEC) 20 MG DR capsule Take 1 capsule (20 mg) by mouth daily. 90 capsule 3    syringe, disposable, (B-D SYRINGE LUER-RADHA) 1 ML MISC USE ONCE WEEKLY TO DRAW UP HORMONES 100 each 2    testosterone cypionate (DEPOTESTOSTERONE) 200 MG/ML injection Inject 0.25 mLs (50 mg) into the muscle once a week. 4 mL 5    zolpidem (AMBIEN) 10 MG tablet Take 0.5-1 tablets (5-10 mg) by mouth nightly as needed for sleep. 30 tablet 1     No facility-administered encounter medications on file as of 5/8/2025. "             Review Of Systems  Skin: As above  Eyes: negative  Ears/Nose/Throat: negative  Respiratory: No shortness of breath, dyspnea on exertion, cough, or hemoptysis  Cardiovascular: negative  Gastrointestinal: negative  Genitourinary: negative  Musculoskeletal: negative  Neurologic: negative  Psychiatric: negative  Hematologic/Lymphatic/Immunologic: negative  Endocrine: negative      O:   NAD, WDWN, Alert & Oriented, Mood & Affect wnl, Vitals stable   General appearance sheryl ii   Vitals stable   Alert, oriented and in no acute distress  L shoulder 9mm red macule  pigmented macules on trunk and ext with regular borders and pigment networks        brown macules on trunk and ext    Red papules on trunk   Flesh colored papules on trunk          Eyes: Conjunctivae/lids:Normal     ENT: Lips, mucosa: normal    MSK:Normal    Cardiovascular: peripheral edema none    Pulm: Breathing Normal    Neuro/Psych: Orientation:Normal; Mood/Affect:Normal      MICRO:   L shoulder:Unremarkable epidermis, dermis and superficial subcutis.  No concerning areas for malignancy.     MART1 negative   A/P:  1. lentigo, angioma, dermal nevus, nevi   2. Atypical nevus  Atypical moles are unusual benign moles that may resemble melanoma. People who have them are at increased risk of developing single or multiple melanomas. The higher the number of these moles someone has, the higher the risk; those who have 10 or more have 12 times the risk of developing melanoma compared to the general population. Atypical nevi are found significantly more often in melanoma patients than in the general population.    While atypical moles are considered to be pre-cancerous (more likely to turn into melanoma than regular moles), not everyone who has atypical moles gets melanoma. In fact, most moles -- both ordinary and atypical ones -- never become cancerous. Thus the removal of all atypical nevi is unnecessary. In fact, most of the melanomas found on people with  "atypical moles arise from normal skin and not an atypical mole.    Although a physician bases the initial diagnosis of atypical moles on a physical examination, removing several moles and examining them under a microscope must confirm the diagnosis. This procedure, is called a biopsy.    A pathologist will examine the tissue under a microscope and make the precise diagnosis. Diagnosis by biopsy is not exact, and in difficult cases doctors may split 50/50 down the middle as to whether a mole is melanoma or benign. If the pathologist uses the term \"severely dysplastic\" or \"atypical melanocytic hyperplasia\" or offers a long descriptive narrative it means he really is concerned about melanoma, but does not want to call it that.    Most dermatologists usually recommend that all patients with these severely dysplastic moles have them removed. Also many dermatologists recommend removing \"moderate dysplasia\" moles, if the biopsy didn't get all of it. Those with \"mild dysplasia\" can usually be left alone or watched.  It was a pleasure speaking to Andrew Up today.  Previous clinic  notes and pertinent laboratory tests were reviewed prior to Andrew Up's visit.  Patient encouraged to perform monthly skin exams.  UV precautions reviewed with patient.  Return to clinic 6 months    PROCEDURE NOTE  L shoulder an   EXCISION OF Atypical nevus, Margins confirmed with FROZEN SECTIONS, MART1 stain AND Second intent: After thorough discussion of [facility] [place], consent obtained, anesthesia and prep, the margins of the lesion were identified and an incision was made encompassing the lesion with 3mm margin. The incisions were made through the skin and down to and including the superficial subcutis.  The lesion was removed en bloc and submitted for frozen section pathologic review. Clear margins obtained (1.5cm).  MART1 stain performed one 1 section(s).    REPAIR SECOND INTENT: We discussed the options for wound management in full " with the patient including risks/benefits/possible outcomes. Decision made to allow the wound to heal by second intention. EBL minimal; complications none; wound care routine.  The patient was discharged in good condition and will return in one month or prn for wound evaluation.

## 2025-05-08 NOTE — PATIENT INSTRUCTIONS
Open Wound Care     for ___shoulder ___________        No strenuous activity for 48 hours    Take Tylenol as needed for discomfort.                                                .         Do not drink alcoholic beverages for 48 hours.    Keep the pressure bandage in place for 24 hours. If the bandage becomes blood tinged or loose, reinforce it with gauze and tape.        (Refer to the reverse side of this page for management of bleeding).    Remove bandage in 24 hours and begin wound care as follows:     Clean area with tap water using a Q tip or gauze pad, (shower / bathe normally)  Dry wound with Q tip or gauze pad  Apply Aquaphor, Vaseline, Polysporin or Bacitracin Ointment with a Q tip  Do NOT use Neosporin Ointment *  Cover the wound with a band-aid or nonstick gauze pad and paper tape.  Repeat wound care once a day until wound is completely healed.    It is an old wives tale that a wound heals better when it is exposed to air and allowed to dry out. The wound will heal faster with a better cosmetic result if it is kept moist with ointment and covered with a bandage.  Do not let the wound dry out.      Supplies Needed:                Qtips or gauze pads                Polysporin or Bacitracin Ointment                Bandaids or nonstick gauze pads and paper tape    Wound care kits and brown paper tape are available for purchase at   the pharmacy.    BLEEDING:    Use tightly rolled up gauze or cloth to apply direct pressure over the bandage for 20   minutes.  Reapply pressure for an additional 20 minutes if necessary  Call the office or go to the nearest emergency room if pressure fails to stop the bleeding.  Use additional gauze and tape to maintain pressure once the bleeding has stopped.  Begin wound care 24 hours after surgery as directed.                  WOUND HEALING    One week after surgery a pink / red halo will form around the outside of the wound.   This is new skin.  The center of the wound will  appear yellowish white and produce some drainage.  The pink halo will slowly migrate in toward the center of the wound until the wound is covered with new shiny pink skin.  There will be no more drainage when the wound is completely healed.  It will take six months to one year for the redness to fade.  The scar may be itchy, tight and sensitive to extreme temperatures for a year after the surgery.  Massaging the area several times a day for several minutes after the wound is completely healed will help the scar soften and normalize faster. Begin massage only after healing is complete.      In case of emergency call: Dr Fernandez: [phone]    [facility]: [phone]    [facility]:[phone]                 Proper skin care from [facility]:    -Eliminate harsh soaps as they strip the natural oils from the skin, often resulting in dry itchy skin ( i.e. Dial, Zest, [place])  -Use mild soaps such as Cetaphil or Dove Sensitive Skin in the shower. You do not need to use soap on arms, legs, and trunk every time you shower unless visibly soiled.   -Avoid hot or cold showers.  -After showering, lightly dry off and apply moisturizing within 2-3 minutes. This will help trap moisture in the skin.   -Aggressive use of a moisturizer at least 1-2 times a day to the entire body (including -Vanicream, Cetaphil, Aquaphor or Cerave) and moisturize hands after every washing.  -We recommend using moisturizers that come in a tub that needs to be scooped out, not a pump. This has more of an oil base. It will hold moisture in your skin much better than a water base moisturizer. The above recommended are non-pore clogging.      Wear a sunscreen with at least SPF 30 on your face, ears, neck and V of the chest daily. Wear sunscreen on other areas of the body if those areas are exposed to the sun throughout the day. Sunscreens can contain physical and/or chemical blockers. Physical blockers are less  likely to clog pores, these include zinc oxide and titanium dioxide. Reapply every two hour and after swimming.     Sunscreen examples: https://www.ewg.org/sunscreen/    UV radiation  UVA radiation remains constant throughout the day and throughout the year. It is a longer wavelength than UVB and therefore penetrates deeper into the skin leading to immediate and delayed tanning, photoaging, and skin cancer. 70-80% of UVA and UVB radiation occurs between the hours of 10am-2pm.  UVB radiation  UVB radiation causes the most harmful effects and is more significant during the summer months. However, snow and ice can reflect UVB radiation leading to skin damage during the winter months as well. UVB radiation is responsible for tanning, burning, inflammation, delayed erythema (pinkness), pigmentation (brown spots), and skin cancer.     I recommend self monthly full body exams and yearly full body exams with a dermatology provider. If you develop a new or changing lesion please follow up for examination. Most skin cancers are pink and scaly or pink and pearly. However, we do see blue/brown/black skin cancers.  Consider the ABCDEs of melanoma when giving yourself your monthly full body exam ( don't forget the groin, buttocks, feet, toes, etc). A-asymmetry, B-borders, C-color, D-diameter, E-elevation or evolving. If you see any of these changes please follow up in clinic. If you cannot see your back I recommend purchasing a hand held mirror to use with a larger wall mirror.       Checking for Skin Cancer  You can find cancer early by checking your skin each month. There are 3 kinds of skin cancer. They are melanoma, basal cell carcinoma, and squamous cell carcinoma. Doing monthly skin checks is the best way to find new marks or skin changes. Follow the instructions below for checking your skin.   The ABCDEs of checking moles for melanoma   Check your moles or growths for signs of melanoma using ABCDE:   Asymmetry: the sides  of the mole or growth don t match  Border: the edges are ragged, notched, or blurred  Color: the color within the mole or growth varies  Diameter: the mole or growth is larger than 6 mm (size of a pencil eraser)  Evolving: the size, shape, or color of the mole or growth is changing (evolving is not shown in the images below)    Checking for other types of skin cancer  Basal cell carcinoma or squamous cell carcinoma have symptoms such as:     A spot or mole that looks different from all other marks on your skin  Changes in how an area feels, such as itching, tenderness, or pain  Changes in the skin's surface, such as oozing, bleeding, or scaliness  A sore that does not heal  New swelling or redness beyond the border of a mole    Who s at risk?  Anyone can get skin cancer. But you are at greater risk if you have:   Fair skin, light-colored hair, or light-colored eyes  Many moles or abnormal moles on your skin  A history of sunburns from sunlight or tanning beds  A family history of skin cancer  A history of exposure to radiation or chemicals  A weakened immune system  If you have had skin cancer in the past, you are at risk for recurring skin cancer.   How to check your skin  Do your monthly skin checkups in front of a full-length mirror. Check all parts of your body, including your:   Head (ears, face, neck, and scalp)  Torso (front, back, and sides)  Arms (tops, undersides, upper, and lower armpits)  Hands (palms, backs, and fingers, including under the nails)  Buttocks and genitals  Legs (front, back, and sides)  Feet (tops, soles, toes, including under the nails, and between toes)  If you have a lot of moles, take digital photos of them each month. Make sure to take photos both up close and from a distance. These can help you see if any moles change over time.   Most skin changes are not cancer. But if you see any changes in your skin, call your doctor right away. Only he or she can diagnose a problem. If you have  skin cancer, seeing your doctor can be the first step toward getting the treatment that could save your life.   [employer] last reviewed this educational content on 4/1/2019 2000-2020 [employer]. [address] [place] [address], [place] [address]. All rights reserved. This information is not intended as a substitute for professional medical care. Always follow your healthcare professional's instructions.       When should I call my doctor?  If you are worsening or not improving, please, contact us or seek urgent care as noted below.     Who should I call with questions (adults)?    [facility] [phone]  For urgent needs outside of business hours call the [facility] at [phone] and ask for the dermatology resident on call to be paged  If this is a medical emergency and you are unable to reach an ER, Call [phone]      If you need a prescription refill, please contact your pharmacy. Refills are approved or denied by our Physicians during normal business hours, Monday through Friday.  Per office policy, refills will not be granted if you have not been seen within the past year (or sooner depending on the condition).

## 2025-05-08 NOTE — LETTER
5/8/2025      Andrew Up  [address]  [facility] [address]      Dear Colleague,    Thank you for referring your patient, Andrew Up, to the [facility]. Please see a copy of my visit note below.    Surgical Office Location:  [facility]  [address] [place] [id number]      Andrew Up , a 44 year old year old male patient, I was asked to see by Dr. Christianson for atypical nevus on l shoulder blade.  Patient has no other skin complaints today.  Remainder of the HPI, Meds, PMH, Allergies, FH, and SH was reviewed in chart.      Past Medical History:   Diagnosis Date     Gastroesophageal reflux disease      Hyperlipidemia        Past Surgical History:   Procedure Laterality Date     EXCISE EXOSTOSIS FOOT Right 6/4/2024    Procedure: Excision of bone spur osteophyte right great toe;  Surgeon: Marty Isidro DPM;  Location: [facility] OR     HERNIA REPAIR       ORCHIECTOMY INGUINAL Left 2011     SEPTOPLASTY      2010 about     VARICOCELECTOMY Left 2009    ** repair        Family History   Problem Relation Age of Onset     Hyperlipidemia Mother      Depression Mother      Sleep Apnea Mother      Hyperlipidemia Father      Anxiety Disorder Father      Substance Abuse Father      Hypertension Maternal Grandmother      Anxiety Disorder Maternal Grandmother      Hypertension Maternal Grandfather      Diabetes Maternal Grandfather      Substance Abuse Paternal Grandfather      Cerebrovascular Disease Paternal Grandfather        Social History     Socioeconomic History     Marital status:      Spouse name: Not on file     Number of children: Not on file     Years of education: Not on file     Highest education level: Not on file   Occupational History     Not on file   Tobacco Use     Smoking status: Every Day     Current packs/day: 0.00     Average packs/day: 0.5 packs/day for 12.0 years (6.0 ttl pk-yrs)     Types: Cigarettes     Start date: 7/1/2009      Last attempt to quit: 7/1/2021     Years since quitting: 3.8     Smokeless tobacco: Never     Tobacco comments:     using Wellbutrin   Substance and Sexual Activity     Alcohol use: Yes     Alcohol/week: 0.0 standard drinks of alcohol     Comment: 3-4 times per month, 1-2 drinks     Drug use: No     Sexual activity: Yes   Other Topics Concern     Parent/sibling w/ CABG, MI or angioplasty before 65F 55M? Not Asked   Social History Narrative     Not on file     Social Drivers of Health     Financial Resource Strain: Low Risk  (3/9/2025)    Financial Resource Strain      Within the past 12 months, have you or your family members you live with been unable to get utilities (heat, electricity) when it was really needed?: No   Food Insecurity: Low Risk  (3/9/2025)    Food Insecurity      Within the past 12 months, did you worry that your food would run out before you got money to buy more?: No      Within the past 12 months, did the food you bought just not last and you didn t have money to get more?: No   Transportation Needs: Low Risk  (3/9/2025)    Transportation Needs      Within the past 12 months, has lack of transportation kept you from medical appointments, getting your medicines, non-medical meetings or appointments, work, or from getting things that you need?: No   Physical Activity: Sufficiently Active (3/9/2025)    Exercise Vital Sign      Days of Exercise per Week: 4 days      Minutes of Exercise per Session: 60 min   Stress: Stress Concern Present (3/9/2025)    Citizen of [place] of Occupational Health - Occupational Stress Questionnaire      Feeling of Stress : To some extent   Social Connections: Unknown (3/9/2025)    Social Connection and Isolation Panel [NHANES]      Frequency of Communication with Friends and Family: Not on file      Frequency of Social Gatherings with Friends and Family: Twice a week      Attends Alevism Services: Not on file      Active Member of Clubs or Organizations: Not on  "file      Attends Club or Organization Meetings: Not on file      Marital Status: Not on file   Interpersonal Safety: Low Risk  (3/11/2025)    Interpersonal Safety      Do you feel physically and emotionally safe where you currently live?: Yes      Within the past 12 months, have you been hit, slapped, kicked or otherwise physically hurt by someone?: No      Within the past 12 months, have you been humiliated or emotionally abused in other ways by your partner or ex-partner?: No   Housing Stability: Low Risk  (3/9/2025)    Housing Stability      Do you have housing? : Yes      Are you worried about losing your housing?: No       Outpatient Encounter Medications as of 5/8/2025   Medication Sig Dispense Refill     albuterol (PROAIR HFA/PROVENTIL HFA/VENTOLIN HFA) 108 (90 Base) MCG/ACT inhaler Inhale 2 puffs into the lungs every 4 hours as needed for shortness of breath, wheezing or cough 8.5 g 11     atorvastatin (LIPITOR) 20 MG tablet TAKE 1 TABLET BY MOUTH EVERY DAY 90 tablet 3     emtricitabine-tenofovir (TRUVADA) 200-300 MG per tablet Take 1 tablet by mouth daily. 90 tablet 0     EPINEPHrine (ANY BX GENERIC EQUIV) 0.3 MG/0.3ML injection 2-pack Inject 0.3 mg (0.3 ml) into the muscle once as needed for anaphylaxis. 2 mL 0     fluticasone (FLONASE) 50 MCG/ACT nasal spray Spray 1 spray into both nostrils daily       Krill Oil Omega-3 500 MG CAPS        lisinopril (ZESTRIL) 20 MG tablet Take 1 tablet (20 mg) by mouth daily. 90 tablet 3     melatonin 5 MG tablet Take 1 tablet (5 mg) by mouth nightly as needed for sleep       multivitamin, therapeutic (THERA-VIT) TABS tablet Take 1 tablet by mouth daily       needle, disp, (BD HYPODERMIC NEEDLE) 18G X 1\" MISC USE TO DRAW UP HORMONES ONCE WEEKLY 100 each 2     Needle, Disp, 25G X 1-1/2\" MISC Use once weekly for administering hormone into the muscle. 100 each 2     omeprazole (PRILOSEC) 20 MG DR capsule Take 1 capsule (20 mg) by mouth daily. 90 capsule 3     syringe, " disposable, (B-D SYRINGE LUER-RADHA) 1 ML MISC USE ONCE WEEKLY TO DRAW UP HORMONES 100 each 2     testosterone cypionate (DEPOTESTOSTERONE) 200 MG/ML injection Inject 0.25 mLs (50 mg) into the muscle once a week. 4 mL 5     zolpidem (AMBIEN) 10 MG tablet Take 0.5-1 tablets (5-10 mg) by mouth nightly as needed for sleep. 30 tablet 1     No facility-administered encounter medications on file as of 5/8/2025.             Review Of Systems  Skin: As above  Eyes: negative  Ears/Nose/Throat: negative  Respiratory: No shortness of breath, dyspnea on exertion, cough, or hemoptysis  Cardiovascular: negative  Gastrointestinal: negative  Genitourinary: negative  Musculoskeletal: negative  Neurologic: negative  Psychiatric: negative  Hematologic/Lymphatic/Immunologic: negative  Endocrine: negative      O:   NAD, WDWN, Alert & Oriented, Mood & Affect wnl, Vitals stable   General appearance sheryl ii   Vitals stable   Alert, oriented and in no acute distress  L shoulder 9mm red macule  pigmented macules on trunk and ext with regular borders and pigment networks        brown macules on trunk and ext    Red papules on trunk   Flesh colored papules on trunk          Eyes: Conjunctivae/lids:Normal     ENT: Lips, mucosa: normal    MSK:Normal    Cardiovascular: peripheral edema none    Pulm: Breathing Normal    Neuro/Psych: Orientation:Normal; Mood/Affect:Normal      MICRO:   L shoulder:Unremarkable epidermis, dermis and superficial subcutis.  No concerning areas for malignancy.     MART1 negative   A/P:  1. lentigo, angioma, dermal nevus  2. Atypical nevus  Atypical moles are unusual benign moles that may resemble melanoma. People who have them are at increased risk of developing single or multiple melanomas. The higher the number of these moles someone has, the higher the risk; those who have 10 or more have 12 times the risk of developing melanoma compared to the general population. Atypical nevi are found significantly more often in  "melanoma patients than in the general population.    While atypical moles are considered to be pre-cancerous (more likely to turn into melanoma than regular moles), not everyone who has atypical moles gets melanoma. In fact, most moles -- both ordinary and atypical ones -- never become cancerous. Thus the removal of all atypical nevi is unnecessary. In fact, most of the melanomas found on people with atypical moles arise from normal skin and not an atypical mole.    Although a physician bases the initial diagnosis of atypical moles on a physical examination, removing several moles and examining them under a microscope must confirm the diagnosis. This procedure, is called a biopsy.    A pathologist will examine the tissue under a microscope and make the precise diagnosis. Diagnosis by biopsy is not exact, and in difficult cases doctors may split 50/50 down the middle as to whether a mole is melanoma or benign. If the pathologist uses the term \"severely dysplastic\" or \"atypical melanocytic hyperplasia\" or offers a long descriptive narrative it means he really is concerned about melanoma, but does not want to call it that.    Most dermatologists usually recommend that all patients with these severely dysplastic moles have them removed. Also many dermatologists recommend removing \"moderate dysplasia\" moles, if the biopsy didn't get all of it. Those with \"mild dysplasia\" can usually be left alone or watched.  It was a pleasure speaking to Andrew Up today.  Previous clinic  notes and pertinent laboratory tests were reviewed prior to Andrew Up's visit.  Patient encouraged to perform monthly skin exams.  UV precautions reviewed with patient.  Return to clinic 6 months    PROCEDURE NOTE  L shoulder an   EXCISION OF Atypical nevus, Margins confirmed with FROZEN SECTIONS, MART1 stain AND Second intent: After thorough discussion of PGACAC, consent obtained, anesthesia and prep, the margins of the lesion were " identified and an incision was made encompassing the lesion with 3mm margin. The incisions were made through the skin and down to and including the superficial subcutis.  The lesion was removed en bloc and submitted for frozen section pathologic review. Clear margins obtained (1.5cm).  MART1 stain performed one 1 section(s).    REPAIR SECOND INTENT: We discussed the options for wound management in full with the patient including risks/benefits/possible outcomes. Decision made to allow the wound to heal by second intention. EBL minimal; complications none; wound care routine.  The patient was discharged in good condition and will return in one month or prn for wound evaluation.       Again, thank you for allowing me to participate in the care of your patient.        Sincerely,        Marty Fernandez MD    Electronically signed

## 2025-06-10 ENCOUNTER — LAB (OUTPATIENT)
Dept: LAB | Facility: CLINIC | Age: 45
End: 2025-06-10
Payer: COMMERCIAL

## 2025-06-10 DIAGNOSIS — Z11.1 SCREENING EXAMINATION FOR PULMONARY TUBERCULOSIS: ICD-10-CM

## 2025-06-10 DIAGNOSIS — E66.811 CLASS 1 OBESITY: ICD-10-CM

## 2025-06-10 DIAGNOSIS — Z20.6 EXPOSURE TO HUMAN IMMUNODEFICIENCY VIRUS: ICD-10-CM

## 2025-06-10 LAB
ANION GAP SERPL CALCULATED.3IONS-SCNC: 11 MMOL/L (ref 7–15)
BUN SERPL-MCNC: 19.8 MG/DL (ref 6–20)
CALCIUM SERPL-MCNC: 9.1 MG/DL (ref 8.8–10.4)
CHLORIDE SERPL-SCNC: 100 MMOL/L (ref 98–107)
CREAT SERPL-MCNC: 1 MG/DL (ref 0.67–1.17)
EGFRCR SERPLBLD CKD-EPI 2021: >90 ML/MIN/1.73M2
EST. AVERAGE GLUCOSE BLD GHB EST-MCNC: 105 MG/DL
GLUCOSE SERPL-MCNC: 105 MG/DL (ref 70–99)
HBA1C MFR BLD: 5.3 % (ref 0–5.6)
HCO3 SERPL-SCNC: 26 MMOL/L (ref 22–29)
HIV 1+2 AB+HIV1 P24 AG SERPL QL IA: NONREACTIVE
POTASSIUM SERPL-SCNC: 4.4 MMOL/L (ref 3.4–5.3)
SODIUM SERPL-SCNC: 137 MMOL/L (ref 135–145)

## 2025-06-10 PROCEDURE — 87389 HIV-1 AG W/HIV-1&-2 AB AG IA: CPT

## 2025-06-10 PROCEDURE — 80048 BASIC METABOLIC PNL TOTAL CA: CPT

## 2025-06-10 PROCEDURE — 83036 HEMOGLOBIN GLYCOSYLATED A1C: CPT

## 2025-06-10 PROCEDURE — 86481 TB AG RESPONSE T-CELL SUSP: CPT

## 2025-06-10 PROCEDURE — 36415 COLL VENOUS BLD VENIPUNCTURE: CPT

## 2025-06-11 ENCOUNTER — RESULTS FOLLOW-UP (OUTPATIENT)
Dept: FAMILY MEDICINE | Facility: CLINIC | Age: 45
End: 2025-06-11

## 2025-06-12 LAB
GAMMA INTERFERON BACKGROUND BLD IA-ACNC: 0.08 IU/ML
M TB IFN-G BLD-IMP: NEGATIVE
M TB IFN-G CD4+ BCKGRND COR BLD-ACNC: 9.92 IU/ML
MITOGEN IGNF BCKGRD COR BLD-ACNC: 0.01 IU/ML
MITOGEN IGNF BCKGRD COR BLD-ACNC: 0.04 IU/ML
QUANTIFERON MITOGEN: 10 IU/ML
QUANTIFERON NIL TUBE: 0.08 IU/ML
QUANTIFERON TB1 TUBE: 0.09 IU/ML
QUANTIFERON TB2 TUBE: 0.12

## 2025-07-13 DIAGNOSIS — K20.90 ESOPHAGITIS: ICD-10-CM

## 2025-07-13 DIAGNOSIS — K44.9 HIATAL HERNIA: ICD-10-CM

## 2025-07-14 RX ORDER — OMEPRAZOLE 20 MG/1
20 CAPSULE, DELAYED RELEASE ORAL DAILY
Qty: 90 CAPSULE | Refills: 3 | Status: SHIPPED | OUTPATIENT
Start: 2025-07-14

## 2025-07-14 NOTE — TELEPHONE ENCOUNTER
"Request for medication refill:    Medication Name: omeprazole (PRILOSEC) 20 MG DR capsule     Providers if patient needs an appointment and you are willing to give a one month supply please refill for one month and  send a MyChart using \".SMILLIMITEDREFILL\" .Or route chart to \"P [facility] \" . And use the phrase \" SMIRXFOLLOWUP\"To call patient and inform of limited refill and providers request to make an appointment. (Giving one month refill in non controlled medications is strongly recommended before denial)    If refill has been denied, meaning absolutely no refills without visit, please complete the smart phrase \".SMIRXREFUSE\" and route it to the \"P [facility] MED REFILLS\"  pool to inform the patient and the pharmacy.    Tramaine Ferro, CMA      "

## 2025-07-24 DIAGNOSIS — Z20.6 EXPOSURE TO HUMAN IMMUNODEFICIENCY VIRUS: ICD-10-CM

## 2025-07-24 RX ORDER — EMTRICITABINE AND TENOFOVIR DISOPROXIL FUMARATE 200; 300 MG/1; MG/1
1 TABLET, FILM COATED ORAL DAILY
Qty: 90 TABLET | Refills: 0 | Status: SHIPPED | OUTPATIENT
Start: 2025-07-24

## 2025-07-24 NOTE — TELEPHONE ENCOUNTER
"Request for medication refill:    Medication Name:     emtricitabine-tenofovir (TRUVADA) 200-300 MG per tablet     Providers if patient needs an appointment and you are willing to give a one month supply please refill for one month and  send a MyChart using \".SMILLIMITEDREFILL\" .Or route chart to \"P [facility] \" . And use the phrase \" SMIRXFOLLOWUP\"To call patient and inform of limited refill and providers request to make an appointment. (Giving one month refill in non controlled medications is strongly recommended before denial)    If refill has been denied, meaning absolutely no refills without visit, please complete the smart phrase \".SMIRXREFUSE\" and route it to the \"P [facility] MED REFILLS\"  pool to inform the patient and the pharmacy.    Kaylie Meléndez MA     "

## 2025-07-28 ENCOUNTER — MYC REFILL (OUTPATIENT)
Dept: FAMILY MEDICINE | Facility: CLINIC | Age: 45
End: 2025-07-28
Payer: COMMERCIAL

## 2025-07-28 DIAGNOSIS — E29.1 HYPOGONADISM MALE: ICD-10-CM

## 2025-07-29 RX ORDER — SYRINGE, DISPOSABLE, 1 ML
SYRINGE, EMPTY DISPOSABLE MISCELLANEOUS
Qty: 100 EACH | Refills: 2 | Status: SHIPPED | OUTPATIENT
Start: 2025-07-29

## 2025-07-29 RX ORDER — NEEDLES, DISPOSABLE 25GX5/8"
NEEDLE, DISPOSABLE MISCELLANEOUS
Qty: 100 EACH | Refills: 2 | Status: SHIPPED | OUTPATIENT
Start: 2025-07-29

## 2025-07-29 NOTE — TELEPHONE ENCOUNTER
"Request for medication refill:    Medication Name: needle, disp, (BD HYPODERMIC NEEDLE) 18G X 1\" MISC   Needle, Disp, 25G X 1-1/2\" MISC   syringe, disposable, (B-D SYRINGE LUER-RADHA) 1 ML MISC     Providers if patient needs an appointment and you are willing to give a one month supply please refill for one month and  send a MyChart using \".SMILLIMITEDREFILL\" .Or route chart to \"P [facility] \" . And use the phrase \" SMIRXFOLLOWUP\"To call patient and inform of limited refill and providers request to make an appointment. (Giving one month refill in non controlled medications is strongly recommended before denial)    If refill has been denied, meaning absolutely no refills without visit, please complete the smart phrase \".SMIRXREFUSE\" and route it to the \"P [facility] MED REFILLS\"  pool to inform the patient and the pharmacy.    Keron Oliveira, CMA     "

## 2025-08-27 ENCOUNTER — MYC REFILL (OUTPATIENT)
Dept: FAMILY MEDICINE | Facility: CLINIC | Age: 45
End: 2025-08-27
Payer: COMMERCIAL

## 2025-08-27 DIAGNOSIS — G47.26 SHIFT WORK SLEEP DISORDER: ICD-10-CM

## 2025-08-27 RX ORDER — ZOLPIDEM TARTRATE 10 MG/1
5-10 TABLET ORAL
Qty: 30 TABLET | Refills: 1 | Status: SHIPPED | OUTPATIENT
Start: 2025-08-27

## (undated) DEVICE — DRAPE MINI C-ARM 4003

## (undated) DEVICE — PREP CHLORAPREP 26ML TINTED HI-LITE ORANGE 930815

## (undated) DEVICE — PACK EXTREMITY SOP15EXFSD

## (undated) DEVICE — SOL WATER IRRIG 1000ML BOTTLE 2F7114

## (undated) DEVICE — DRSG KERLIX FLUFFS X5

## (undated) DEVICE — DRSG GAUZE 4X4" 3033

## (undated) DEVICE — BNDG ROLLER GAUZE CONFORM 3"X4YD 41-53

## (undated) DEVICE — SU VICRYL 3-0 PS-1 18" UND J683

## (undated) DEVICE — DRAPE STERI TOWEL LG 1010

## (undated) DEVICE — GLOVE BIOGEL PI MICRO INDICATOR UNDERGLOVE SZ 8.0 48980

## (undated) DEVICE — BLADE SAW OSCILLATING STRYK MED 9.0X25X0.38MM 2296-003-111

## (undated) DEVICE — GLOVE BIOGEL PI MICRO SZ 8.0 48580

## (undated) DEVICE — BLADE KNIFE SURG 15 371115

## (undated) DEVICE — PIN GUARD 10-1-001 101001PBX

## (undated) DEVICE — CAST PADDING 4" STERILE 9044S

## (undated) DEVICE — ESU GROUND PAD UNIVERSAL W/O CORD

## (undated) DEVICE — SU VICRYL 4-0 PS-2 18" UND J496H

## (undated) DEVICE — DECANTER BAG 2002S

## (undated) DEVICE — LINEN TOWEL PACK X5 5464

## (undated) DEVICE — SOL NACL 0.9% IRRIG 1000ML BOTTLE 2F7124

## (undated) RX ORDER — DEXAMETHASONE SODIUM PHOSPHATE 4 MG/ML
INJECTION, SOLUTION INTRA-ARTICULAR; INTRALESIONAL; INTRAMUSCULAR; INTRAVENOUS; SOFT TISSUE
Status: DISPENSED
Start: 2024-06-04

## (undated) RX ORDER — BUPIVACAINE HYDROCHLORIDE 5 MG/ML
INJECTION, SOLUTION EPIDURAL; INTRACAUDAL
Status: DISPENSED
Start: 2024-06-04

## (undated) RX ORDER — FENTANYL CITRATE 50 UG/ML
INJECTION, SOLUTION INTRAMUSCULAR; INTRAVENOUS
Status: DISPENSED
Start: 2024-06-04

## (undated) RX ORDER — ONDANSETRON 2 MG/ML
INJECTION INTRAMUSCULAR; INTRAVENOUS
Status: DISPENSED
Start: 2024-06-04

## (undated) RX ORDER — LIDOCAINE HYDROCHLORIDE 20 MG/ML
INJECTION, SOLUTION INFILTRATION; PERINEURAL
Status: DISPENSED
Start: 2024-06-04